# Patient Record
Sex: FEMALE | Race: WHITE | NOT HISPANIC OR LATINO
[De-identification: names, ages, dates, MRNs, and addresses within clinical notes are randomized per-mention and may not be internally consistent; named-entity substitution may affect disease eponyms.]

---

## 2017-01-31 ENCOUNTER — APPOINTMENT (OUTPATIENT)
Dept: INTERNAL MEDICINE | Facility: CLINIC | Age: 52
End: 2017-01-31

## 2017-02-17 ENCOUNTER — OUTPATIENT (OUTPATIENT)
Dept: OUTPATIENT SERVICES | Facility: HOSPITAL | Age: 52
LOS: 1 days | Discharge: HOME | End: 2017-02-17

## 2017-06-27 DIAGNOSIS — I10 ESSENTIAL (PRIMARY) HYPERTENSION: ICD-10-CM

## 2017-06-27 DIAGNOSIS — I25.10 ATHEROSCLEROTIC HEART DISEASE OF NATIVE CORONARY ARTERY WITHOUT ANGINA PECTORIS: ICD-10-CM

## 2017-06-27 DIAGNOSIS — Z01.818 ENCOUNTER FOR OTHER PREPROCEDURAL EXAMINATION: ICD-10-CM

## 2017-08-15 ENCOUNTER — APPOINTMENT (OUTPATIENT)
Dept: INTERNAL MEDICINE | Facility: CLINIC | Age: 52
End: 2017-08-15

## 2017-08-15 ENCOUNTER — OUTPATIENT (OUTPATIENT)
Dept: OUTPATIENT SERVICES | Facility: HOSPITAL | Age: 52
LOS: 1 days | Discharge: HOME | End: 2017-08-15

## 2017-08-15 VITALS
HEART RATE: 82 BPM | WEIGHT: 184 LBS | TEMPERATURE: 98.6 F | BODY MASS INDEX: 32.6 KG/M2 | HEIGHT: 63 IN | DIASTOLIC BLOOD PRESSURE: 72 MMHG | SYSTOLIC BLOOD PRESSURE: 116 MMHG

## 2017-08-15 DIAGNOSIS — Z86.73 PERSONAL HISTORY OF TRANSIENT ISCHEMIC ATTACK (TIA), AND CEREBRAL INFARCTION W/OUT RESIDUAL DEFICITS: ICD-10-CM

## 2017-08-15 DIAGNOSIS — I97.88 OTHER INTRAOPERATIVE COMPLICATIONS OF THE CIRCULATORY SYSTEM, NOT ELSEWHERE CLASSIFIED: ICD-10-CM

## 2017-08-15 DIAGNOSIS — R29.898 OTHER SYMPTOMS AND SIGNS INVOLVING THE MUSCULOSKELETAL SYSTEM: ICD-10-CM

## 2017-08-15 DIAGNOSIS — L28.2 OTHER PRURIGO: ICD-10-CM

## 2017-08-15 DIAGNOSIS — N17.9 ACUTE KIDNEY FAILURE, UNSPECIFIED: ICD-10-CM

## 2017-08-15 DIAGNOSIS — I25.10 ATHEROSCLEROTIC HEART DISEASE OF NATIVE CORONARY ARTERY WITHOUT ANGINA PECTORIS: ICD-10-CM

## 2017-08-16 DIAGNOSIS — I10 ESSENTIAL (PRIMARY) HYPERTENSION: ICD-10-CM

## 2017-08-16 DIAGNOSIS — E78.5 HYPERLIPIDEMIA, UNSPECIFIED: ICD-10-CM

## 2017-08-16 DIAGNOSIS — I50.9 HEART FAILURE, UNSPECIFIED: ICD-10-CM

## 2017-08-23 ENCOUNTER — APPOINTMENT (OUTPATIENT)
Dept: CARDIOLOGY | Facility: CLINIC | Age: 52
End: 2017-08-23

## 2017-08-23 VITALS
HEIGHT: 63 IN | HEART RATE: 67 BPM | WEIGHT: 184 LBS | BODY MASS INDEX: 32.6 KG/M2 | SYSTOLIC BLOOD PRESSURE: 130 MMHG | DIASTOLIC BLOOD PRESSURE: 84 MMHG

## 2017-09-11 ENCOUNTER — OUTPATIENT (OUTPATIENT)
Dept: OUTPATIENT SERVICES | Facility: HOSPITAL | Age: 52
LOS: 1 days | Discharge: HOME | End: 2017-09-11

## 2017-09-11 DIAGNOSIS — I97.88 OTHER INTRAOPERATIVE COMPLICATIONS OF THE CIRCULATORY SYSTEM, NOT ELSEWHERE CLASSIFIED: ICD-10-CM

## 2017-09-11 DIAGNOSIS — L28.2 OTHER PRURIGO: ICD-10-CM

## 2017-09-11 DIAGNOSIS — Z01.818 ENCOUNTER FOR OTHER PREPROCEDURAL EXAMINATION: ICD-10-CM

## 2017-09-11 DIAGNOSIS — N17.9 ACUTE KIDNEY FAILURE, UNSPECIFIED: ICD-10-CM

## 2017-09-11 DIAGNOSIS — R29.898 OTHER SYMPTOMS AND SIGNS INVOLVING THE MUSCULOSKELETAL SYSTEM: ICD-10-CM

## 2017-09-11 DIAGNOSIS — I25.10 ATHEROSCLEROTIC HEART DISEASE OF NATIVE CORONARY ARTERY WITHOUT ANGINA PECTORIS: ICD-10-CM

## 2017-09-14 ENCOUNTER — OUTPATIENT (OUTPATIENT)
Dept: OUTPATIENT SERVICES | Facility: HOSPITAL | Age: 52
LOS: 1 days | Discharge: HOME | End: 2017-09-14

## 2017-09-14 DIAGNOSIS — I34.0 NONRHEUMATIC MITRAL (VALVE) INSUFFICIENCY: ICD-10-CM

## 2017-09-14 DIAGNOSIS — I25.10 ATHEROSCLEROTIC HEART DISEASE OF NATIVE CORONARY ARTERY WITHOUT ANGINA PECTORIS: ICD-10-CM

## 2017-09-14 DIAGNOSIS — I97.88 OTHER INTRAOPERATIVE COMPLICATIONS OF THE CIRCULATORY SYSTEM, NOT ELSEWHERE CLASSIFIED: ICD-10-CM

## 2017-09-14 DIAGNOSIS — N17.9 ACUTE KIDNEY FAILURE, UNSPECIFIED: ICD-10-CM

## 2017-09-14 DIAGNOSIS — R29.898 OTHER SYMPTOMS AND SIGNS INVOLVING THE MUSCULOSKELETAL SYSTEM: ICD-10-CM

## 2017-09-14 DIAGNOSIS — L28.2 OTHER PRURIGO: ICD-10-CM

## 2017-09-15 ENCOUNTER — RX RENEWAL (OUTPATIENT)
Age: 52
End: 2017-09-15

## 2017-10-13 ENCOUNTER — OUTPATIENT (OUTPATIENT)
Dept: OUTPATIENT SERVICES | Facility: HOSPITAL | Age: 52
LOS: 1 days | Discharge: HOME | End: 2017-10-13

## 2017-10-13 DIAGNOSIS — I25.10 ATHEROSCLEROTIC HEART DISEASE OF NATIVE CORONARY ARTERY WITHOUT ANGINA PECTORIS: ICD-10-CM

## 2017-10-13 DIAGNOSIS — N17.9 ACUTE KIDNEY FAILURE, UNSPECIFIED: ICD-10-CM

## 2017-10-13 DIAGNOSIS — Z01.818 ENCOUNTER FOR OTHER PREPROCEDURAL EXAMINATION: ICD-10-CM

## 2017-10-13 DIAGNOSIS — R29.898 OTHER SYMPTOMS AND SIGNS INVOLVING THE MUSCULOSKELETAL SYSTEM: ICD-10-CM

## 2017-10-13 DIAGNOSIS — I97.88 OTHER INTRAOPERATIVE COMPLICATIONS OF THE CIRCULATORY SYSTEM, NOT ELSEWHERE CLASSIFIED: ICD-10-CM

## 2017-10-13 DIAGNOSIS — E78.00 PURE HYPERCHOLESTEROLEMIA, UNSPECIFIED: ICD-10-CM

## 2017-10-13 DIAGNOSIS — E05.00 THYROTOXICOSIS WITH DIFFUSE GOITER WITHOUT THYROTOXIC CRISIS OR STORM: ICD-10-CM

## 2017-10-13 DIAGNOSIS — L28.2 OTHER PRURIGO: ICD-10-CM

## 2017-10-19 ENCOUNTER — INPATIENT (INPATIENT)
Facility: HOSPITAL | Age: 52
LOS: 5 days | Discharge: HOME | End: 2017-10-25
Attending: INTERNAL MEDICINE

## 2017-10-19 DIAGNOSIS — R29.898 OTHER SYMPTOMS AND SIGNS INVOLVING THE MUSCULOSKELETAL SYSTEM: ICD-10-CM

## 2017-10-19 DIAGNOSIS — N17.9 ACUTE KIDNEY FAILURE, UNSPECIFIED: ICD-10-CM

## 2017-10-19 DIAGNOSIS — I97.88 OTHER INTRAOPERATIVE COMPLICATIONS OF THE CIRCULATORY SYSTEM, NOT ELSEWHERE CLASSIFIED: ICD-10-CM

## 2017-10-19 DIAGNOSIS — I25.10 ATHEROSCLEROTIC HEART DISEASE OF NATIVE CORONARY ARTERY WITHOUT ANGINA PECTORIS: ICD-10-CM

## 2017-10-19 DIAGNOSIS — L28.2 OTHER PRURIGO: ICD-10-CM

## 2017-10-19 DIAGNOSIS — N18.3 CHRONIC KIDNEY DISEASE, STAGE 3 (MODERATE): ICD-10-CM

## 2017-10-26 DIAGNOSIS — I42.9 CARDIOMYOPATHY, UNSPECIFIED: ICD-10-CM

## 2017-10-26 DIAGNOSIS — I25.10 ATHEROSCLEROTIC HEART DISEASE OF NATIVE CORONARY ARTERY WITHOUT ANGINA PECTORIS: ICD-10-CM

## 2017-10-26 DIAGNOSIS — I65.23 OCCLUSION AND STENOSIS OF BILATERAL CAROTID ARTERIES: ICD-10-CM

## 2017-10-26 DIAGNOSIS — I34.0 NONRHEUMATIC MITRAL (VALVE) INSUFFICIENCY: ICD-10-CM

## 2017-10-26 DIAGNOSIS — N18.3 CHRONIC KIDNEY DISEASE, STAGE 3 (MODERATE): ICD-10-CM

## 2017-10-26 DIAGNOSIS — Z86.73 PERSONAL HISTORY OF TRANSIENT ISCHEMIC ATTACK (TIA), AND CEREBRAL INFARCTION WITHOUT RESIDUAL DEFICITS: ICD-10-CM

## 2017-10-26 DIAGNOSIS — E78.00 PURE HYPERCHOLESTEROLEMIA, UNSPECIFIED: ICD-10-CM

## 2017-10-26 DIAGNOSIS — Z82.49 FAMILY HISTORY OF ISCHEMIC HEART DISEASE AND OTHER DISEASES OF THE CIRCULATORY SYSTEM: ICD-10-CM

## 2017-10-26 DIAGNOSIS — R73.9 HYPERGLYCEMIA, UNSPECIFIED: ICD-10-CM

## 2017-10-26 DIAGNOSIS — J44.9 CHRONIC OBSTRUCTIVE PULMONARY DISEASE, UNSPECIFIED: ICD-10-CM

## 2017-10-26 DIAGNOSIS — I12.9 HYPERTENSIVE CHRONIC KIDNEY DISEASE WITH STAGE 1 THROUGH STAGE 4 CHRONIC KIDNEY DISEASE, OR UNSPECIFIED CHRONIC KIDNEY DISEASE: ICD-10-CM

## 2017-11-06 ENCOUNTER — INPATIENT (INPATIENT)
Facility: HOSPITAL | Age: 52
LOS: 2 days | Discharge: HOME | End: 2017-11-09
Attending: SURGERY | Admitting: INTERNAL MEDICINE

## 2017-11-06 ENCOUNTER — APPOINTMENT (OUTPATIENT)
Dept: CARDIOLOGY | Facility: CLINIC | Age: 52
End: 2017-11-06

## 2017-11-06 VITALS
HEIGHT: 63 IN | WEIGHT: 176 LBS | SYSTOLIC BLOOD PRESSURE: 116 MMHG | BODY MASS INDEX: 31.18 KG/M2 | DIASTOLIC BLOOD PRESSURE: 68 MMHG | HEART RATE: 101 BPM

## 2017-11-06 DIAGNOSIS — L28.2 OTHER PRURIGO: ICD-10-CM

## 2017-11-06 DIAGNOSIS — I25.10 ATHEROSCLEROTIC HEART DISEASE OF NATIVE CORONARY ARTERY WITHOUT ANGINA PECTORIS: ICD-10-CM

## 2017-11-06 DIAGNOSIS — R29.898 OTHER SYMPTOMS AND SIGNS INVOLVING THE MUSCULOSKELETAL SYSTEM: ICD-10-CM

## 2017-11-06 DIAGNOSIS — I97.88 OTHER INTRAOPERATIVE COMPLICATIONS OF THE CIRCULATORY SYSTEM, NOT ELSEWHERE CLASSIFIED: ICD-10-CM

## 2017-11-06 DIAGNOSIS — N17.9 ACUTE KIDNEY FAILURE, UNSPECIFIED: ICD-10-CM

## 2017-11-06 RX ORDER — CLOPIDOGREL BISULFATE 75 MG/1
75 TABLET, FILM COATED ORAL DAILY
Qty: 30 | Refills: 6 | Status: DISCONTINUED | COMMUNITY
Start: 2017-09-15 | End: 2017-11-06

## 2017-11-06 RX ORDER — LISINOPRIL AND HYDROCHLOROTHIAZIDE TABLETS 20; 12.5 MG/1; MG/1
20-12.5 TABLET ORAL DAILY
Qty: 30 | Refills: 3 | Status: DISCONTINUED | COMMUNITY
Start: 2017-08-15 | End: 2017-11-06

## 2017-11-07 ENCOUNTER — APPOINTMENT (OUTPATIENT)
Dept: VASCULAR SURGERY | Facility: HOSPITAL | Age: 52
End: 2017-11-07
Payer: MEDICAID

## 2017-11-07 PROCEDURE — 35226 REPAIR BLOOD VESSEL DIR LXTR: CPT | Mod: 59,LT

## 2017-11-07 PROCEDURE — 35390 REOPERATION CAROTID ADD-ON: CPT

## 2017-11-07 PROCEDURE — 35301 RECHANNELING OF ARTERY: CPT | Mod: LT

## 2017-11-07 PROCEDURE — 35371 RECHANNELING OF ARTERY: CPT | Mod: 59,LT

## 2017-11-08 DIAGNOSIS — I70.8 ATHEROSCLEROSIS OF OTHER ARTERIES: ICD-10-CM

## 2017-11-10 ENCOUNTER — INPATIENT (INPATIENT)
Facility: HOSPITAL | Age: 52
LOS: 4 days | Discharge: HOME | End: 2017-11-15
Attending: INTERNAL MEDICINE | Admitting: INTERNAL MEDICINE

## 2017-11-10 DIAGNOSIS — N17.9 ACUTE KIDNEY FAILURE, UNSPECIFIED: ICD-10-CM

## 2017-11-10 DIAGNOSIS — L28.2 OTHER PRURIGO: ICD-10-CM
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
## 2017-11-10 DIAGNOSIS — I83.812 VARICOSE VEINS OF LEFT LOWER EXTREMITY WITH PAIN: ICD-10-CM

## 2017-11-10 DIAGNOSIS — R29.898 OTHER SYMPTOMS AND SIGNS INVOLVING THE MUSCULOSKELETAL SYSTEM: ICD-10-CM

## 2017-11-10 DIAGNOSIS — I97.88 OTHER INTRAOPERATIVE COMPLICATIONS OF THE CIRCULATORY SYSTEM, NOT ELSEWHERE CLASSIFIED: ICD-10-CM

## 2017-11-10 DIAGNOSIS — I97.89 OTHER POSTPROCEDURAL COMPLICATIONS AND DISORDERS OF THE CIRCULATORY SYSTEM, NOT ELSEWHERE CLASSIFIED: ICD-10-CM

## 2017-11-10 DIAGNOSIS — I25.10 ATHEROSCLEROTIC HEART DISEASE OF NATIVE CORONARY ARTERY WITHOUT ANGINA PECTORIS: ICD-10-CM

## 2017-11-12 DIAGNOSIS — I10 ESSENTIAL (PRIMARY) HYPERTENSION: ICD-10-CM

## 2017-11-12 DIAGNOSIS — E78.5 HYPERLIPIDEMIA, UNSPECIFIED: ICD-10-CM

## 2017-11-12 DIAGNOSIS — I95.81 POSTPROCEDURAL HYPOTENSION: ICD-10-CM

## 2017-11-12 DIAGNOSIS — I72.4 ANEURYSM OF ARTERY OF LOWER EXTREMITY: ICD-10-CM

## 2017-11-12 DIAGNOSIS — I25.10 ATHEROSCLEROTIC HEART DISEASE OF NATIVE CORONARY ARTERY WITHOUT ANGINA PECTORIS: ICD-10-CM

## 2017-11-12 DIAGNOSIS — Z95.5 PRESENCE OF CORONARY ANGIOPLASTY IMPLANT AND GRAFT: ICD-10-CM

## 2017-11-12 DIAGNOSIS — T81.718A COMPLICATION OF OTHER ARTERY FOLLOWING A PROCEDURE, NOT ELSEWHERE CLASSIFIED, INITIAL ENCOUNTER: ICD-10-CM

## 2017-11-13 ENCOUNTER — OTHER (OUTPATIENT)
Age: 52
End: 2017-11-13

## 2017-11-16 DIAGNOSIS — D62 ACUTE POSTHEMORRHAGIC ANEMIA: ICD-10-CM

## 2017-11-17 DIAGNOSIS — L03.115 CELLULITIS OF RIGHT LOWER LIMB: ICD-10-CM

## 2017-11-17 DIAGNOSIS — L03.116 CELLULITIS OF LEFT LOWER LIMB: ICD-10-CM

## 2017-11-17 DIAGNOSIS — Z86.73 PERSONAL HISTORY OF TRANSIENT ISCHEMIC ATTACK (TIA), AND CEREBRAL INFARCTION WITHOUT RESIDUAL DEFICITS: ICD-10-CM

## 2017-11-17 DIAGNOSIS — T36.1X5A ADVERSE EFFECT OF CEPHALOSPORINS AND OTHER BETA-LACTAM ANTIBIOTICS, INITIAL ENCOUNTER: ICD-10-CM

## 2017-11-17 DIAGNOSIS — L27.0 GENERALIZED SKIN ERUPTION DUE TO DRUGS AND MEDICAMENTS TAKEN INTERNALLY: ICD-10-CM

## 2017-11-17 DIAGNOSIS — Z95.5 PRESENCE OF CORONARY ANGIOPLASTY IMPLANT AND GRAFT: ICD-10-CM

## 2017-11-17 DIAGNOSIS — T81.718D COMPLICATION OF OTHER ARTERY FOLLOWING A PROCEDURE, NOT ELSEWHERE CLASSIFIED, SUBSEQUENT ENCOUNTER: ICD-10-CM

## 2017-11-17 DIAGNOSIS — R60.0 LOCALIZED EDEMA: ICD-10-CM

## 2017-11-17 DIAGNOSIS — I25.10 ATHEROSCLEROTIC HEART DISEASE OF NATIVE CORONARY ARTERY WITHOUT ANGINA PECTORIS: ICD-10-CM
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
## 2017-11-17 DIAGNOSIS — R23.3 SPONTANEOUS ECCHYMOSES: ICD-10-CM

## 2017-11-17 DIAGNOSIS — E78.5 HYPERLIPIDEMIA, UNSPECIFIED: ICD-10-CM

## 2017-11-17 DIAGNOSIS — E87.70 FLUID OVERLOAD, UNSPECIFIED: ICD-10-CM

## 2017-11-17 DIAGNOSIS — N17.9 ACUTE KIDNEY FAILURE, UNSPECIFIED: ICD-10-CM

## 2017-11-17 DIAGNOSIS — I10 ESSENTIAL (PRIMARY) HYPERTENSION: ICD-10-CM

## 2017-11-21 ENCOUNTER — OUTPATIENT (OUTPATIENT)
Dept: OUTPATIENT SERVICES | Facility: HOSPITAL | Age: 52
LOS: 1 days | Discharge: HOME | End: 2017-11-21

## 2017-11-21 ENCOUNTER — APPOINTMENT (OUTPATIENT)
Dept: INTERNAL MEDICINE | Facility: CLINIC | Age: 52
End: 2017-11-21

## 2017-11-21 VITALS
SYSTOLIC BLOOD PRESSURE: 135 MMHG | DIASTOLIC BLOOD PRESSURE: 90 MMHG | HEART RATE: 79 BPM | WEIGHT: 173 LBS | BODY MASS INDEX: 30.65 KG/M2 | HEIGHT: 63 IN

## 2017-11-21 DIAGNOSIS — J30.2 OTHER SEASONAL ALLERGIC RHINITIS: ICD-10-CM

## 2017-11-21 DIAGNOSIS — I25.10 ATHEROSCLEROTIC HEART DISEASE OF NATIVE CORONARY ARTERY WITHOUT ANGINA PECTORIS: ICD-10-CM

## 2017-11-21 DIAGNOSIS — I97.88 OTHER INTRAOPERATIVE COMPLICATIONS OF THE CIRCULATORY SYSTEM, NOT ELSEWHERE CLASSIFIED: ICD-10-CM

## 2017-11-21 DIAGNOSIS — L28.2 OTHER PRURIGO: ICD-10-CM

## 2017-11-21 DIAGNOSIS — N17.9 ACUTE KIDNEY FAILURE, UNSPECIFIED: ICD-10-CM

## 2017-11-21 DIAGNOSIS — I63.9 CEREBRAL INFARCTION, UNSPECIFIED: ICD-10-CM

## 2017-11-21 DIAGNOSIS — R29.898 OTHER SYMPTOMS AND SIGNS INVOLVING THE MUSCULOSKELETAL SYSTEM: ICD-10-CM

## 2017-11-22 DIAGNOSIS — I10 ESSENTIAL (PRIMARY) HYPERTENSION: ICD-10-CM

## 2017-11-22 DIAGNOSIS — I50.9 HEART FAILURE, UNSPECIFIED: ICD-10-CM

## 2017-11-22 DIAGNOSIS — E78.5 HYPERLIPIDEMIA, UNSPECIFIED: ICD-10-CM

## 2017-11-28 ENCOUNTER — APPOINTMENT (OUTPATIENT)
Dept: VASCULAR SURGERY | Facility: CLINIC | Age: 52
End: 2017-11-28
Payer: MEDICAID

## 2017-11-28 VITALS
HEIGHT: 63 IN | BODY MASS INDEX: 30.65 KG/M2 | SYSTOLIC BLOOD PRESSURE: 130 MMHG | DIASTOLIC BLOOD PRESSURE: 84 MMHG | WEIGHT: 173 LBS

## 2017-11-28 PROCEDURE — 99024 POSTOP FOLLOW-UP VISIT: CPT

## 2017-12-10 ENCOUNTER — INPATIENT (INPATIENT)
Facility: HOSPITAL | Age: 52
LOS: 29 days | Discharge: HOME HEALTH | End: 2018-01-09
Attending: SURGERY | Admitting: INTERNAL MEDICINE

## 2017-12-10 DIAGNOSIS — L76.32 POSTPROCEDURAL HEMATOMA OF SKIN AND SUBCUTANEOUS TISSUE FOLLOWING OTHER PROCEDURE: ICD-10-CM

## 2017-12-10 DIAGNOSIS — T81.4XXA INFECTION FOLLOWING A PROCEDURE, INITIAL ENCOUNTER: ICD-10-CM

## 2017-12-11 ENCOUNTER — OTHER (OUTPATIENT)
Age: 52
End: 2017-12-11

## 2017-12-11 ENCOUNTER — APPOINTMENT (OUTPATIENT)
Dept: VASCULAR SURGERY | Facility: HOSPITAL | Age: 52
End: 2017-12-11
Payer: MEDICAID

## 2017-12-11 PROCEDURE — 97605 NEG PRS WND THER DME<=50SQCM: CPT

## 2017-12-11 PROCEDURE — 36246 INS CATH ABD/L-EXT ART 2ND: CPT | Mod: 78

## 2017-12-11 PROCEDURE — 35142 REPAIR ARTERY RUPTURE THIGH: CPT | Mod: 78

## 2017-12-11 PROCEDURE — 75710 ARTERY X-RAYS ARM/LEG: CPT | Mod: 26

## 2017-12-11 PROCEDURE — 76937 US GUIDE VASCULAR ACCESS: CPT | Mod: 26

## 2017-12-13 ENCOUNTER — APPOINTMENT (OUTPATIENT)
Dept: VASCULAR SURGERY | Facility: CLINIC | Age: 52
End: 2017-12-13

## 2017-12-14 ENCOUNTER — APPOINTMENT (OUTPATIENT)
Dept: VASCULAR SURGERY | Facility: HOSPITAL | Age: 52
End: 2017-12-14
Payer: MEDICAID

## 2017-12-14 PROCEDURE — 97605 NEG PRS WND THER DME<=50SQCM: CPT | Mod: LT

## 2017-12-14 PROCEDURE — 11043 DBRDMT MUSC&/FSCA 1ST 20/<: CPT | Mod: 58,LT

## 2017-12-20 ENCOUNTER — APPOINTMENT (OUTPATIENT)
Dept: VASCULAR SURGERY | Facility: HOSPITAL | Age: 52
End: 2017-12-20
Payer: MEDICAID

## 2017-12-20 PROCEDURE — 11043 DBRDMT MUSC&/FSCA 1ST 20/<: CPT | Mod: 78

## 2017-12-20 PROCEDURE — 36558 INSERT TUNNELED CV CATH: CPT | Mod: 79

## 2017-12-22 ENCOUNTER — APPOINTMENT (OUTPATIENT)
Dept: VASCULAR SURGERY | Facility: HOSPITAL | Age: 52
End: 2017-12-22
Payer: MEDICAID

## 2017-12-22 PROCEDURE — 10180 I&D COMPLEX PO WOUND INFCTJ: CPT | Mod: 78,LT

## 2017-12-22 PROCEDURE — 35221 RPR BLD VSL DIR INTRA-ABDL: CPT | Mod: 78,LT

## 2017-12-22 PROCEDURE — 35226 REPAIR BLOOD VESSEL DIR LXTR: CPT | Mod: 78,59,LT

## 2017-12-22 PROCEDURE — 35665 BPG ILIOFEMORAL: CPT | Mod: 78,59,LT

## 2017-12-29 ENCOUNTER — APPOINTMENT (OUTPATIENT)
Dept: VASCULAR SURGERY | Facility: HOSPITAL | Age: 52
End: 2017-12-29
Payer: MEDICAID

## 2017-12-29 PROCEDURE — 97605 NEG PRS WND THER DME<=50SQCM: CPT | Mod: LT

## 2017-12-29 PROCEDURE — 11043 DBRDMT MUSC&/FSCA 1ST 20/<: CPT | Mod: 58,LT

## 2018-01-14 DIAGNOSIS — N39.0 URINARY TRACT INFECTION, SITE NOT SPECIFIED: ICD-10-CM

## 2018-01-14 DIAGNOSIS — N05.8 UNSPECIFIED NEPHRITIC SYNDROME WITH OTHER MORPHOLOGIC CHANGES: ICD-10-CM

## 2018-01-14 DIAGNOSIS — E87.2 ACIDOSIS: ICD-10-CM

## 2018-01-14 DIAGNOSIS — E87.1 HYPO-OSMOLALITY AND HYPONATREMIA: ICD-10-CM

## 2018-01-14 DIAGNOSIS — I70.208 UNSPECIFIED ATHEROSCLEROSIS OF NATIVE ARTERIES OF EXTREMITIES, OTHER EXTREMITY: ICD-10-CM

## 2018-01-14 DIAGNOSIS — D62 ACUTE POSTHEMORRHAGIC ANEMIA: ICD-10-CM

## 2018-01-14 DIAGNOSIS — N17.0 ACUTE KIDNEY FAILURE WITH TUBULAR NECROSIS: ICD-10-CM

## 2018-01-14 DIAGNOSIS — I12.9 HYPERTENSIVE CHRONIC KIDNEY DISEASE WITH STAGE 1 THROUGH STAGE 4 CHRONIC KIDNEY DISEASE, OR UNSPECIFIED CHRONIC KIDNEY DISEASE: ICD-10-CM

## 2018-01-14 DIAGNOSIS — N18.3 CHRONIC KIDNEY DISEASE, STAGE 3 (MODERATE): ICD-10-CM

## 2018-01-14 DIAGNOSIS — T81.718A COMPLICATION OF OTHER ARTERY FOLLOWING A PROCEDURE, NOT ELSEWHERE CLASSIFIED, INITIAL ENCOUNTER: ICD-10-CM

## 2018-01-14 DIAGNOSIS — I25.10 ATHEROSCLEROTIC HEART DISEASE OF NATIVE CORONARY ARTERY WITHOUT ANGINA PECTORIS: ICD-10-CM

## 2018-01-14 DIAGNOSIS — I72.4 ANEURYSM OF ARTERY OF LOWER EXTREMITY: ICD-10-CM

## 2018-01-14 DIAGNOSIS — A41.9 SEPSIS, UNSPECIFIED ORGANISM: ICD-10-CM

## 2018-01-14 DIAGNOSIS — F17.210 NICOTINE DEPENDENCE, CIGARETTES, UNCOMPLICATED: ICD-10-CM

## 2018-01-14 DIAGNOSIS — K21.9 GASTRO-ESOPHAGEAL REFLUX DISEASE WITHOUT ESOPHAGITIS: ICD-10-CM

## 2018-01-14 DIAGNOSIS — Z95.5 PRESENCE OF CORONARY ANGIOPLASTY IMPLANT AND GRAFT: ICD-10-CM

## 2018-01-14 DIAGNOSIS — J96.01 ACUTE RESPIRATORY FAILURE WITH HYPOXIA: ICD-10-CM

## 2018-01-14 DIAGNOSIS — E83.39 OTHER DISORDERS OF PHOSPHORUS METABOLISM: ICD-10-CM

## 2018-01-14 DIAGNOSIS — T81.19XA OTHER POSTPROCEDURAL SHOCK, INITIAL ENCOUNTER: ICD-10-CM

## 2018-01-14 DIAGNOSIS — E78.5 HYPERLIPIDEMIA, UNSPECIFIED: ICD-10-CM

## 2018-01-14 DIAGNOSIS — E87.6 HYPOKALEMIA: ICD-10-CM

## 2018-01-14 DIAGNOSIS — R65.21 SEVERE SEPSIS WITH SEPTIC SHOCK: ICD-10-CM

## 2018-01-14 DIAGNOSIS — B96.29 OTHER ESCHERICHIA COLI [E. COLI] AS THE CAUSE OF DISEASES CLASSIFIED ELSEWHERE: ICD-10-CM

## 2018-01-14 DIAGNOSIS — L27.0 GENERALIZED SKIN ERUPTION DUE TO DRUGS AND MEDICAMENTS TAKEN INTERNALLY: ICD-10-CM

## 2018-01-14 DIAGNOSIS — L76.32 POSTPROCEDURAL HEMATOMA OF SKIN AND SUBCUTANEOUS TISSUE FOLLOWING OTHER PROCEDURE: ICD-10-CM

## 2018-01-14 DIAGNOSIS — T36.8X5A ADVERSE EFFECT OF OTHER SYSTEMIC ANTIBIOTICS, INITIAL ENCOUNTER: ICD-10-CM
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
## 2018-01-15 ENCOUNTER — OTHER (OUTPATIENT)
Age: 53
End: 2018-01-15

## 2018-01-17 ENCOUNTER — OUTPATIENT (OUTPATIENT)
Dept: OUTPATIENT SERVICES | Facility: HOSPITAL | Age: 53
LOS: 1 days | Discharge: HOME | End: 2018-01-17

## 2018-01-17 DIAGNOSIS — I97.88 OTHER INTRAOPERATIVE COMPLICATIONS OF THE CIRCULATORY SYSTEM, NOT ELSEWHERE CLASSIFIED: ICD-10-CM

## 2018-01-17 DIAGNOSIS — I25.10 ATHEROSCLEROTIC HEART DISEASE OF NATIVE CORONARY ARTERY WITHOUT ANGINA PECTORIS: ICD-10-CM

## 2018-01-17 DIAGNOSIS — R29.898 OTHER SYMPTOMS AND SIGNS INVOLVING THE MUSCULOSKELETAL SYSTEM: ICD-10-CM

## 2018-01-17 DIAGNOSIS — B99.9 UNSPECIFIED INFECTIOUS DISEASE: ICD-10-CM

## 2018-01-17 DIAGNOSIS — L28.2 OTHER PRURIGO: ICD-10-CM

## 2018-01-17 DIAGNOSIS — N17.9 ACUTE KIDNEY FAILURE, UNSPECIFIED: ICD-10-CM

## 2018-01-18 ENCOUNTER — INPATIENT (INPATIENT)
Facility: HOSPITAL | Age: 53
LOS: 32 days | Discharge: NURSING, REHAB OR LONG-TERM CARE | End: 2018-02-20
Attending: SURGERY | Admitting: INTERNAL MEDICINE

## 2018-01-18 DIAGNOSIS — T82.838A HEMORRHAGE DUE TO VASCULAR PROSTHETIC DEVICES, IMPLANTS AND GRAFTS, INITIAL ENCOUNTER: ICD-10-CM

## 2018-01-18 DIAGNOSIS — T81.32XA DISRUPTION OF INTERNAL OPERATION (SURGICAL) WOUND, NOT ELSEWHERE CLASSIFIED, INITIAL ENCOUNTER: ICD-10-CM
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
## 2018-01-22 ENCOUNTER — OTHER (OUTPATIENT)
Age: 53
End: 2018-01-22

## 2018-01-23 ENCOUNTER — APPOINTMENT (OUTPATIENT)
Dept: VASCULAR SURGERY | Facility: HOSPITAL | Age: 53
End: 2018-01-23
Payer: MEDICAID

## 2018-01-23 ENCOUNTER — APPOINTMENT (OUTPATIENT)
Dept: VASCULAR SURGERY | Facility: CLINIC | Age: 53
End: 2018-01-23

## 2018-01-23 PROCEDURE — 36589 REMOVAL TUNNELED CV CATH: CPT | Mod: 58,RT

## 2018-01-24 DIAGNOSIS — T81.4XXA INFECTION FOLLOWING A PROCEDURE, INITIAL ENCOUNTER: ICD-10-CM

## 2018-01-24 DIAGNOSIS — Z79.01 LONG TERM (CURRENT) USE OF ANTICOAGULANTS: ICD-10-CM

## 2018-01-24 DIAGNOSIS — R78.81 BACTEREMIA: ICD-10-CM

## 2018-01-24 DIAGNOSIS — L03.314 CELLULITIS OF GROIN: ICD-10-CM

## 2018-01-24 DIAGNOSIS — I97.610 POSTPROCEDURAL HEMORRHAGE OF A CIRCULATORY SYSTEM ORGAN OR STRUCTURE FOLLOWING A CARDIAC CATHETERIZATION: ICD-10-CM

## 2018-01-24 DIAGNOSIS — N17.9 ACUTE KIDNEY FAILURE, UNSPECIFIED: ICD-10-CM

## 2018-01-24 DIAGNOSIS — B96.20 UNSPECIFIED ESCHERICHIA COLI [E. COLI] AS THE CAUSE OF DISEASES CLASSIFIED ELSEWHERE: ICD-10-CM

## 2018-01-26 ENCOUNTER — APPOINTMENT (OUTPATIENT)
Dept: VASCULAR SURGERY | Facility: HOSPITAL | Age: 53
End: 2018-01-26

## 2018-01-31 ENCOUNTER — APPOINTMENT (OUTPATIENT)
Dept: VASCULAR SURGERY | Facility: HOSPITAL | Age: 53
End: 2018-01-31
Payer: MEDICAID

## 2018-01-31 PROCEDURE — 35556 ART BYP GRFT FEM-POPLITEAL: CPT | Mod: 58,59

## 2018-01-31 PROCEDURE — 35840 EXPLORE ABDOMINAL VESSELS: CPT | Mod: 58

## 2018-01-31 PROCEDURE — 35881 REVISE GRAFT W/VEIN: CPT | Mod: 58

## 2018-01-31 PROCEDURE — 35860 EXPLORE LIMB VESSELS: CPT | Mod: 58,59

## 2018-01-31 PROCEDURE — 35565 ART BYP GRFT ILIOFEMORAL: CPT | Mod: 58

## 2018-01-31 PROCEDURE — 35903 EXCISION GRAFT EXTREMITY: CPT | Mod: 58

## 2018-02-03 VITALS — WEIGHT: 178.57 LBS | HEIGHT: 63 IN

## 2018-02-03 RX ORDER — DOCUSATE SODIUM 100 MG
100 CAPSULE ORAL THREE TIMES A DAY
Qty: 0 | Refills: 0 | Status: DISCONTINUED | OUTPATIENT
Start: 2018-02-03 | End: 2018-02-03

## 2018-02-03 RX ORDER — NAFCILLIN 10 G/100ML
2 INJECTION, POWDER, FOR SOLUTION INTRAVENOUS EVERY 4 HOURS
Qty: 0 | Refills: 0 | Status: DISCONTINUED | OUTPATIENT
Start: 2018-02-03 | End: 2018-02-07

## 2018-02-03 RX ORDER — ATORVASTATIN CALCIUM 80 MG/1
80 TABLET, FILM COATED ORAL AT BEDTIME
Qty: 0 | Refills: 0 | Status: DISCONTINUED | OUTPATIENT
Start: 2018-02-03 | End: 2018-02-20

## 2018-02-03 RX ORDER — NAFCILLIN 10 G/100ML
2 INJECTION, POWDER, FOR SOLUTION INTRAVENOUS EVERY 4 HOURS
Qty: 0 | Refills: 0 | Status: DISCONTINUED | OUTPATIENT
Start: 2018-02-03 | End: 2018-02-03

## 2018-02-03 RX ORDER — ACETAMINOPHEN 500 MG
650 TABLET ORAL EVERY 6 HOURS
Qty: 0 | Refills: 0 | Status: DISCONTINUED | OUTPATIENT
Start: 2018-02-03 | End: 2018-02-03

## 2018-02-03 RX ORDER — METOPROLOL TARTRATE 50 MG
25 TABLET ORAL
Qty: 0 | Refills: 0 | Status: DISCONTINUED | OUTPATIENT
Start: 2018-02-03 | End: 2018-02-09

## 2018-02-03 RX ORDER — NAFCILLIN 10 G/100ML
2 INJECTION, POWDER, FOR SOLUTION INTRAVENOUS ONCE
Qty: 0 | Refills: 0 | Status: DISCONTINUED | OUTPATIENT
Start: 2018-02-03 | End: 2018-02-03

## 2018-02-03 RX ORDER — NAFCILLIN 10 G/100ML
INJECTION, POWDER, FOR SOLUTION INTRAVENOUS
Qty: 0 | Refills: 0 | Status: DISCONTINUED | OUTPATIENT
Start: 2018-02-03 | End: 2018-02-03

## 2018-02-03 RX ORDER — ACETAMINOPHEN 500 MG
650 TABLET ORAL EVERY 4 HOURS
Qty: 0 | Refills: 0 | Status: DISCONTINUED | OUTPATIENT
Start: 2018-02-03 | End: 2018-02-20

## 2018-02-03 RX ORDER — ONDANSETRON 8 MG/1
4 TABLET, FILM COATED ORAL EVERY 8 HOURS
Qty: 0 | Refills: 0 | Status: DISCONTINUED | OUTPATIENT
Start: 2018-02-03 | End: 2018-02-20

## 2018-02-03 RX ORDER — CALCIUM ACETATE 667 MG
667 TABLET ORAL
Qty: 0 | Refills: 0 | Status: DISCONTINUED | OUTPATIENT
Start: 2018-02-03 | End: 2018-02-03

## 2018-02-03 RX ORDER — ONDANSETRON 8 MG/1
4 TABLET, FILM COATED ORAL THREE TIMES A DAY
Qty: 0 | Refills: 0 | Status: DISCONTINUED | OUTPATIENT
Start: 2018-02-03 | End: 2018-02-03

## 2018-02-03 RX ORDER — DOCUSATE SODIUM 100 MG
100 CAPSULE ORAL THREE TIMES A DAY
Qty: 0 | Refills: 0 | Status: DISCONTINUED | OUTPATIENT
Start: 2018-02-03 | End: 2018-02-20

## 2018-02-03 RX ORDER — ATORVASTATIN CALCIUM 80 MG/1
80 TABLET, FILM COATED ORAL AT BEDTIME
Qty: 0 | Refills: 0 | Status: DISCONTINUED | OUTPATIENT
Start: 2018-02-03 | End: 2018-02-03

## 2018-02-03 RX ORDER — METOPROLOL TARTRATE 50 MG
25 TABLET ORAL
Qty: 0 | Refills: 0 | Status: DISCONTINUED | OUTPATIENT
Start: 2018-02-03 | End: 2018-02-03

## 2018-02-03 RX ORDER — CALCIUM ACETATE 667 MG
667 TABLET ORAL
Qty: 0 | Refills: 0 | Status: DISCONTINUED | OUTPATIENT
Start: 2018-02-03 | End: 2018-02-16

## 2018-02-03 RX ORDER — ONDANSETRON 8 MG/1
4 TABLET, FILM COATED ORAL EVERY 8 HOURS
Qty: 0 | Refills: 0 | Status: DISCONTINUED | OUTPATIENT
Start: 2018-02-03 | End: 2018-02-03

## 2018-02-03 RX ORDER — OXYCODONE HYDROCHLORIDE 5 MG/1
5 TABLET ORAL EVERY 4 HOURS
Qty: 0 | Refills: 0 | Status: DISCONTINUED | OUTPATIENT
Start: 2018-02-03 | End: 2018-02-07

## 2018-02-03 RX ORDER — OXYCODONE AND ACETAMINOPHEN 5; 325 MG/1; MG/1
1 TABLET ORAL EVERY 4 HOURS
Qty: 0 | Refills: 0 | Status: DISCONTINUED | OUTPATIENT
Start: 2018-02-03 | End: 2018-02-03

## 2018-02-03 RX ADMIN — Medication 100 MILLIGRAM(S): at 21:59

## 2018-02-03 RX ADMIN — OXYCODONE HYDROCHLORIDE 5 MILLIGRAM(S): 5 TABLET ORAL at 23:52

## 2018-02-03 RX ADMIN — NAFCILLIN 200 GRAM(S): 10 INJECTION, POWDER, FOR SOLUTION INTRAVENOUS at 22:02

## 2018-02-03 RX ADMIN — Medication 667 MILLIGRAM(S): at 20:10

## 2018-02-03 RX ADMIN — ATORVASTATIN CALCIUM 80 MILLIGRAM(S): 80 TABLET, FILM COATED ORAL at 21:59

## 2018-02-03 RX ADMIN — Medication 25 MILLIGRAM(S): at 20:24

## 2018-02-04 LAB
ANION GAP SERPL CALC-SCNC: 10 MMOL/L — SIGNIFICANT CHANGE UP (ref 7–14)
BUN SERPL-MCNC: 36 MG/DL — HIGH (ref 10–20)
CALCIUM SERPL-MCNC: 7.7 MG/DL — LOW (ref 8.5–10.1)
CHLORIDE SERPL-SCNC: 111 MMOL/L — HIGH (ref 98–110)
CO2 SERPL-SCNC: 20 MMOL/L — SIGNIFICANT CHANGE UP (ref 17–32)
CREAT SERPL-MCNC: 4.6 MG/DL — CRITICAL HIGH (ref 0.7–1.5)
GLUCOSE SERPL-MCNC: 74 MG/DL — SIGNIFICANT CHANGE UP (ref 70–110)
HCT VFR BLD CALC: 22.6 % — LOW (ref 37–47)
HCT VFR BLD CALC: 24.5 % — LOW (ref 37–47)
HGB BLD-MCNC: 7.6 G/DL — LOW (ref 14–18)
HGB BLD-MCNC: 8.4 G/DL — LOW (ref 14–18)
MAGNESIUM SERPL-MCNC: 2 MG/DL — SIGNIFICANT CHANGE UP (ref 1.8–2.4)
MCHC RBC-ENTMCNC: 28.8 PG — SIGNIFICANT CHANGE UP (ref 27–31)
MCHC RBC-ENTMCNC: 29.2 PG — SIGNIFICANT CHANGE UP (ref 27–31)
MCHC RBC-ENTMCNC: 33.6 G/DL — SIGNIFICANT CHANGE UP (ref 32–37)
MCHC RBC-ENTMCNC: 34.3 G/DL — SIGNIFICANT CHANGE UP (ref 32–37)
MCV RBC AUTO: 85.1 FL — SIGNIFICANT CHANGE UP (ref 81–91)
MCV RBC AUTO: 85.6 FL — SIGNIFICANT CHANGE UP (ref 81–91)
NRBC # BLD: 0 /100 WBCS — SIGNIFICANT CHANGE UP (ref 0–0)
NRBC # BLD: 0 /100 WBCS — SIGNIFICANT CHANGE UP (ref 0–0)
PHOSPHATE SERPL-MCNC: 6.6 MG/DL — HIGH (ref 2.1–4.9)
PLATELET # BLD AUTO: 408 K/UL — HIGH (ref 130–400)
PLATELET # BLD AUTO: 409 K/UL — HIGH (ref 130–400)
POTASSIUM SERPL-MCNC: 3.5 MMOL/L — SIGNIFICANT CHANGE UP (ref 3.5–5)
POTASSIUM SERPL-SCNC: 3.5 MMOL/L — SIGNIFICANT CHANGE UP (ref 3.5–5)
RBC # BLD: 2.64 M/UL — LOW (ref 4.2–5.4)
RBC # BLD: 2.88 M/UL — LOW (ref 4.2–5.4)
RBC # FLD: 15.6 % — HIGH (ref 11.5–14.5)
RBC # FLD: 15.6 % — HIGH (ref 11.5–14.5)
SODIUM SERPL-SCNC: 141 MMOL/L — SIGNIFICANT CHANGE UP (ref 135–146)
WBC # BLD: 7.4 K/UL — SIGNIFICANT CHANGE UP (ref 4.8–10.8)
WBC # BLD: 9.33 K/UL — SIGNIFICANT CHANGE UP (ref 4.8–10.8)
WBC # FLD AUTO: 7.4 K/UL — SIGNIFICANT CHANGE UP (ref 4.8–10.8)
WBC # FLD AUTO: 9.33 K/UL — SIGNIFICANT CHANGE UP (ref 4.8–10.8)

## 2018-02-04 RX ORDER — INFLUENZA VIRUS VACCINE 15; 15; 15; 15 UG/.5ML; UG/.5ML; UG/.5ML; UG/.5ML
0.5 SUSPENSION INTRAMUSCULAR ONCE
Qty: 0 | Refills: 0 | Status: DISCONTINUED | OUTPATIENT
Start: 2018-02-04 | End: 2018-02-20

## 2018-02-04 RX ADMIN — OXYCODONE HYDROCHLORIDE 5 MILLIGRAM(S): 5 TABLET ORAL at 11:49

## 2018-02-04 RX ADMIN — NAFCILLIN 200 GRAM(S): 10 INJECTION, POWDER, FOR SOLUTION INTRAVENOUS at 11:13

## 2018-02-04 RX ADMIN — OXYCODONE HYDROCHLORIDE 5 MILLIGRAM(S): 5 TABLET ORAL at 00:30

## 2018-02-04 RX ADMIN — ATORVASTATIN CALCIUM 80 MILLIGRAM(S): 80 TABLET, FILM COATED ORAL at 22:07

## 2018-02-04 RX ADMIN — NAFCILLIN 200 GRAM(S): 10 INJECTION, POWDER, FOR SOLUTION INTRAVENOUS at 06:05

## 2018-02-04 RX ADMIN — NAFCILLIN 200 GRAM(S): 10 INJECTION, POWDER, FOR SOLUTION INTRAVENOUS at 22:07

## 2018-02-04 RX ADMIN — Medication 667 MILLIGRAM(S): at 17:25

## 2018-02-04 RX ADMIN — Medication 100 MILLIGRAM(S): at 22:08

## 2018-02-04 RX ADMIN — Medication 667 MILLIGRAM(S): at 10:31

## 2018-02-04 RX ADMIN — Medication 100 MILLIGRAM(S): at 06:05

## 2018-02-04 RX ADMIN — NAFCILLIN 200 GRAM(S): 10 INJECTION, POWDER, FOR SOLUTION INTRAVENOUS at 03:23

## 2018-02-04 RX ADMIN — NAFCILLIN 200 GRAM(S): 10 INJECTION, POWDER, FOR SOLUTION INTRAVENOUS at 14:58

## 2018-02-04 RX ADMIN — Medication 667 MILLIGRAM(S): at 13:01

## 2018-02-04 RX ADMIN — Medication 25 MILLIGRAM(S): at 06:04

## 2018-02-04 RX ADMIN — OXYCODONE HYDROCHLORIDE 5 MILLIGRAM(S): 5 TABLET ORAL at 11:04

## 2018-02-04 RX ADMIN — NAFCILLIN 200 GRAM(S): 10 INJECTION, POWDER, FOR SOLUTION INTRAVENOUS at 17:25

## 2018-02-04 RX ADMIN — Medication 25 MILLIGRAM(S): at 17:26

## 2018-02-04 NOTE — PROGRESS NOTE ADULT - SUBJECTIVE AND OBJECTIVE BOX
52y Female PAST MEDICAL & SURGICAL HISTORY: S/P LT ILIOFEM BYPASS W PTFE, & LIGATION OF LT CFA 12/2017, S/P LEFT CFA PSA REPAIR WITH PATCH 11/2017, LEFT FEM PSA W/ MULTIPLE DEBRIDEMENS, HTN. ESRD ON HD, CAD      Hospital Day: 17  Post Operative Day: 4    Procedure: S/P EXCISION OF INFECTED PTFE OBTURATOR GRAFT, AND REDO BYPASS WITH REVERSE SAPH VEIN 1/31, S/P TDC REMOVAL 1/23, NEW  FOCAL DISSECTION OF MID RT EXT ILIAC ART    Events of the Last 24h: No acute events, patient lost IV access and dose of antibiotics were delayed. dressing was changed. rehab consult was placed    Vital Signs Last 24 Hrs  T(C): 36.5 (04 Feb 2018 00:11), Max: 36.6 (03 Feb 2018 15:50)  T(F): 97.7 (04 Feb 2018 00:11), Max: 97.8 (03 Feb 2018 15:50)  HR: 73 (04 Feb 2018 00:11) (73 - 94)  BP: 122/69 (04 Feb 2018 00:11) (122/69 - 163/98)  BP(mean): --  RR: 18 (04 Feb 2018 00:11) (18 - 20)  SpO2: --      I&O's Summary   I&O's Detail      PHYSICAL EXAM:    GENERAL: NAD    HEENT: NCAT    CHEST/LUNGS: CTAB    HEART: RRR,  No murmurs, rubs, or gallops    ABDOMEN: SNTND, abdominal wound is tender around incision site    EXTREMITIES:  groin dressing was changed, wound is CDI, medial left thigh dressing is saturated, remainder of dressings are CDI    NEURO: No focal neurological deficits    SKIN: No rashes or lesions    INCISION/WOUNDS: wounds all appear clean, balyne-incisional tenderness       MEDICATIONS  (STANDING):  atorvastatin 80 milliGRAM(s) Oral at bedtime  calcium acetate 667 milliGRAM(s) Oral three times a day with meals  docusate sodium 100 milliGRAM(s) Oral three times a day  metoprolol     tartrate 25 milliGRAM(s) Oral two times a day  nafcillin  IVPB 2 Gram(s) IV Intermittent every 4 hours    MEDICATIONS  (PRN):  acetaminophen   Tablet. 650 milliGRAM(s) Oral every 4 hours PRN Mild Pain (1 - 3)  ondansetron Injectable 4 milliGRAM(s) IV Push every 8 hours PRN Nausea and/or Vomiting  oxyCODONE    IR 5 milliGRAM(s) Oral every 4 hours PRN Moderate Pain (4 - 6)                              8.4    9.33  )-----------( 408      ( 03 Feb 2018 23:21 )             24.5        CBC Full  -  ( 03 Feb 2018 23:21 )  WBC Count : 9.33 K/uL  Hemoglobin : 8.4 g/dL  Hematocrit : 24.5 %  Platelet Count - Automated : 408 K/uL  Mean Cell Volume : 85.1 fL  Mean Cell Hemoglobin : 29.2 pg  Mean Cell Hemoglobin Concentration : 34.3 g/dL  Auto Neutrophil # : x  Auto Lymphocyte # : x  Auto Monocyte # : x  Auto Eosinophil # : x  Auto Basophil # : x  Auto Neutrophil % : x  Auto Lymphocyte % : x  Auto Monocyte % : x  Auto Eosinophil % : x  Auto Basophil % : x      SPECTRA: [id number]

## 2018-02-04 NOTE — PROGRESS NOTE ADULT - ASSESSMENT
Patient stable post operatively. Must continue nafcillin q4h, control pain as needed. daily WTD dressing changes in groin. Trend H&H, f/u with PT/Rehab and encourage ambulation

## 2018-02-05 DIAGNOSIS — L02.612 CUTANEOUS ABSCESS OF LEFT FOOT: ICD-10-CM

## 2018-02-05 DIAGNOSIS — L03.116 CELLULITIS OF LEFT LOWER LIMB: ICD-10-CM

## 2018-02-05 RX ORDER — ASPIRIN/CALCIUM CARB/MAGNESIUM 324 MG
81 TABLET ORAL DAILY
Qty: 0 | Refills: 0 | Status: DISCONTINUED | OUTPATIENT
Start: 2018-02-05 | End: 2018-02-20

## 2018-02-05 RX ADMIN — Medication 81 MILLIGRAM(S): at 13:31

## 2018-02-05 RX ADMIN — OXYCODONE HYDROCHLORIDE 5 MILLIGRAM(S): 5 TABLET ORAL at 00:05

## 2018-02-05 RX ADMIN — OXYCODONE HYDROCHLORIDE 5 MILLIGRAM(S): 5 TABLET ORAL at 15:42

## 2018-02-05 RX ADMIN — Medication 25 MILLIGRAM(S): at 17:36

## 2018-02-05 RX ADMIN — OXYCODONE HYDROCHLORIDE 5 MILLIGRAM(S): 5 TABLET ORAL at 07:39

## 2018-02-05 RX ADMIN — OXYCODONE HYDROCHLORIDE 5 MILLIGRAM(S): 5 TABLET ORAL at 16:30

## 2018-02-05 RX ADMIN — Medication 667 MILLIGRAM(S): at 09:44

## 2018-02-05 RX ADMIN — NAFCILLIN 200 GRAM(S): 10 INJECTION, POWDER, FOR SOLUTION INTRAVENOUS at 01:48

## 2018-02-05 RX ADMIN — NAFCILLIN 200 GRAM(S): 10 INJECTION, POWDER, FOR SOLUTION INTRAVENOUS at 06:00

## 2018-02-05 RX ADMIN — OXYCODONE HYDROCHLORIDE 5 MILLIGRAM(S): 5 TABLET ORAL at 10:38

## 2018-02-05 RX ADMIN — Medication 100 MILLIGRAM(S): at 06:00

## 2018-02-05 RX ADMIN — ATORVASTATIN CALCIUM 80 MILLIGRAM(S): 80 TABLET, FILM COATED ORAL at 21:47

## 2018-02-05 RX ADMIN — Medication 667 MILLIGRAM(S): at 13:31

## 2018-02-05 RX ADMIN — Medication 650 MILLIGRAM(S): at 19:43

## 2018-02-05 RX ADMIN — Medication 100 MILLIGRAM(S): at 13:31

## 2018-02-05 RX ADMIN — Medication 25 MILLIGRAM(S): at 06:00

## 2018-02-05 RX ADMIN — OXYCODONE HYDROCHLORIDE 5 MILLIGRAM(S): 5 TABLET ORAL at 10:37

## 2018-02-05 RX ADMIN — Medication 100 MILLIGRAM(S): at 21:47

## 2018-02-05 RX ADMIN — Medication 667 MILLIGRAM(S): at 17:35

## 2018-02-05 NOTE — CONSULT NOTE ADULT - ASSESSMENT
IMPRESSION: Rehab of gait abnormality     PRECAUTIONS: [ x ] Cardiac  [ x ] Respiratory  [  ] Seizures [  ] Contact Isolation  [  ] Droplet Isolation  [  ] Other    Weight Bearing Status: WBAT    RECOMMENDATION:    Out of Bed to Chair     DVT/Decubiti Prophylaxis    REHAB PLAN:     [ x  ] Bedside P/T 3-5 times a week   [   ]   Bedside O/T  2-3 times a week             [   ] No Rehab Therapy Indicated                   [   ]  Speech Therapy   Conditioning/ROM                                    ADL  Bed Mobility                                               Conditioning/ROM  Transfers                                                     Bed Mobility  Sitting /Standing Balance                         Transfers                                        Gait Training                                               Sitting/Standing Balance  Stair Training [   ]Applicable                    Home equipment Eval                                                                        Splinting  [   ] Only      GOALS:   ADL   [ xx  ]   Independent                    Transfers  [  x ] Independent                          Ambulation  [ x  ] Independent     [  x  ] With device                            [   ]  CG                                                         [   ]  CG                                                                  [   ] CG                            [    ] Min A                                                   [   ] Min A                                                              [   ] Min  A          DISCHARGE PLAN:   [   ]  Good candidate for Intensive Rehabilitation/Hospital based-4A [facility]                                             Will tolerate 3hrs Intensive Rehab Daily                                       [ x   ]  Short Term Rehab in Skilled Nursing Facility                         vs                                     [  x  ]  Home with Outpatient or VN services                                         [    ]  Possible Candidate for Intensive Hospital based Rehab

## 2018-02-05 NOTE — PROGRESS NOTE ADULT - SUBJECTIVE AND OBJECTIVE BOX
VASCULAR SURGERY PROGRESS NOTE    Hospital Day #  Post-Op Day #    Procedure: S/P EXCISION OF INFECTED PTFE OBTURATOR GRAFT, AND REDO BYPASS WITH REVERSE SAPH VEIN 1/31, S/P TDC REMOVAL 1/23, NEW  FOCAL DISSECTION OF MID RT EXT ILIAC ART    Events of past 24 hours:  no events overnight    PAST MEDICAL & SURGICAL HISTORY:  S/P LT ILIOFEM BYPASS W PTFE, & LIGATION OF LT CFA 12/2017, S/P LEFT CFA PSA REPAIR WITH PATCH 11/2017, LEFT FEM PSA W/ MULTIPLE DEBRIDEMENS, HTN. ESRD ON HD, CAD    Vital Signs Last 24 Hrs  T(C): 36.9 (05 Feb 2018 07:47), Max: 37.1 (04 Feb 2018 15:44)  T(F): 98.5 (05 Feb 2018 07:47), Max: 98.7 (04 Feb 2018 15:44)  HR: 77 (05 Feb 2018 07:47) (73 - 80)  BP: 159/83 (05 Feb 2018 07:47) (136/67 - 182/84)  BP(mean): --  RR: 18 (05 Feb 2018 07:47) (18 - 19)  SpO2: --    Pain (0-10):            Pain Control Adequate: [X] YES [] N    Diet: Renal    I&O's Detail    04 Feb 2018 07:01  -  05 Feb 2018 07:00  --------------------------------------------------------  IN:    Oral Fluid: 1010 mL  Total IN: 1010 mL    OUT:    Stool: 1 mL    Voided: 750 mL  Total OUT: 751 mL    Total NET: 259 mL      Bowel Movement: [x] YES [] NO  Flatus: [x] YES [] NO    PHYSICAL EXAM    General: NAD  Abdomen: RLQ ABD INCISION W/ STAPLES, C/D/I OPEN TO AIR.  Extremities: LLE WITH BYPASS INCISION OPEN TO AIR, STAPLES IN PLACE, NO ERYTHEMA, NO DISCHARGE, B/L LE WARM, W/ DOP SIG.  Incision: C/D/I    MEDICATIONS  (STANDING):  atorvastatin 80 milliGRAM(s) Oral at bedtime  calcium acetate 667 milliGRAM(s) Oral three times a day with meals  docusate sodium 100 milliGRAM(s) Oral three times a day  influenza   Vaccine 0.5 milliLiter(s) IntraMuscular once  metoprolol     tartrate 25 milliGRAM(s) Oral two times a day  nafcillin  IVPB 2 Gram(s) IV Intermittent every 4 hours    MEDICATIONS  (PRN):  acetaminophen   Tablet. 650 milliGRAM(s) Oral every 4 hours PRN Mild Pain (1 - 3)  ondansetron Injectable 4 milliGRAM(s) IV Push every 8 hours PRN Nausea and/or Vomiting  oxyCODONE    IR 5 milliGRAM(s) Oral every 4 hours PRN Moderate Pain (4 - 6)      DVT PROPHYLAXIS: [] YES [X] NO  GI PROPHYLAXIS: [] YES [X] NO  ANTIPLATELETS: [] YES [X] NO  ANTICOAGULATION: [] YES [X] NO  ANTIBIOTICS: [X] YES [] NO - nafcillin    LAB/STUDIES:                        7.6    7.40  )-----------( 409      ( 04 Feb 2018 08:15 )             22.6     02-04    141  |  111<H>  |  36<H>  ----------------------------<  74  3.5   |  20  |  4.6<HH>    Ca    7.7<L>      04 Feb 2018 08:15  Phos  6.6     02-04  Mg     2.0     02-04      ASSESSMENT/PLAN:    WILL SCHEDULE FOR PICC LINE TODAY FOR LONG TERM IV ABX, F/U WITH RENAL IF PATIENT WILL REQUIRE HD. BYPASS WOUNDS C/D/I. ON DISCHARGE START ON BRILINTA & ASA.        SPECTRA: [id number] VASCULAR SURGERY PROGRESS NOTE    Procedure: S/P EXCISION OF INFECTED PTFE OBTURATOR GRAFT, AND REDO BYPASS WITH REVERSE SAPH VEIN 1/31, S/P TDC REMOVAL 1/23, NEW  FOCAL DISSECTION OF MID RT EXT ILIAC ART    Events of past 24 hours:  no events overnight    PAST MEDICAL & SURGICAL HISTORY:  S/P LT ILIOFEM BYPASS W PTFE, & LIGATION OF LT CFA 12/2017, S/P LEFT CFA PSA REPAIR WITH PATCH 11/2017, LEFT FEM PSA W/ MULTIPLE DEBRIDEMENS, HTN. ESRD ON HD, CAD    Vital Signs Last 24 Hrs  T(C): 36.9 (05 Feb 2018 07:47), Max: 37.1 (04 Feb 2018 15:44)  T(F): 98.5 (05 Feb 2018 07:47), Max: 98.7 (04 Feb 2018 15:44)  HR: 77 (05 Feb 2018 07:47) (73 - 80)  BP: 159/83 (05 Feb 2018 07:47) (136/67 - 182/84)  BP(mean): --  RR: 18 (05 Feb 2018 07:47) (18 - 19)  SpO2: --    Pain (0-10):            Pain Control Adequate: [X] YES [] N    Diet: Renal    I&O's Detail    04 Feb 2018 07:01  -  05 Feb 2018 07:00  --------------------------------------------------------  IN:    Oral Fluid: 1010 mL  Total IN: 1010 mL    OUT:    Stool: 1 mL    Voided: 750 mL  Total OUT: 751 mL    Total NET: 259 mL      Bowel Movement: [x] YES [] NO  Flatus: [x] YES [] NO    PHYSICAL EXAM    General: NAD  Abdomen: RLQ ABD INCISION W/ STAPLES, C/D/I OPEN TO AIR.  Extremities: LLE WITH BYPASS INCISION OPEN TO AIR, STAPLES IN PLACE, NO ERYTHEMA, NO DISCHARGE, B/L LE WARM, W/ DOP SIG.  Incision: C/D/I, NO DISCHARGE.     MEDICATIONS  (STANDING):  atorvastatin 80 milliGRAM(s) Oral at bedtime  calcium acetate 667 milliGRAM(s) Oral three times a day with meals  docusate sodium 100 milliGRAM(s) Oral three times a day  influenza   Vaccine 0.5 milliLiter(s) IntraMuscular once  metoprolol     tartrate 25 milliGRAM(s) Oral two times a day  nafcillin  IVPB 2 Gram(s) IV Intermittent every 4 hours    MEDICATIONS  (PRN):  acetaminophen   Tablet. 650 milliGRAM(s) Oral every 4 hours PRN Mild Pain (1 - 3)  ondansetron Injectable 4 milliGRAM(s) IV Push every 8 hours PRN Nausea and/or Vomiting  oxyCODONE    IR 5 milliGRAM(s) Oral every 4 hours PRN Moderate Pain (4 - 6)      DVT PROPHYLAXIS: [] YES [X] NO  GI PROPHYLAXIS: [] YES [X] NO  ANTIPLATELETS: [] YES [X] NO  ANTICOAGULATION: [] YES [X] NO  ANTIBIOTICS: [X] YES [] NO - nafcillin    LAB/STUDIES:                        7.6    7.40  )-----------( 409      ( 04 Feb 2018 08:15 )             22.6     02-04    141  |  111<H>  |  36<H>  ----------------------------<  74  3.5   |  20  |  4.6<HH>    Ca    7.7<L>      04 Feb 2018 08:15  Phos  6.6     02-04  Mg     2.0     02-04      ASSESSMENT/PLAN:    - PICC LINE TODAY  - ID: ANCEF X 6 WEEKS.  - RENAL: no need for RRT  - PT/REHAB: SNF vs. HOME PT  - PODIATRY FOLLOWING.   - ON DISCHARGE WILL START ON BRIL[place]TA.    SPECTRA: [id number]

## 2018-02-05 NOTE — PROGRESS NOTE ADULT - SUBJECTIVE AND OBJECTIVE BOX
PROGRESS NOTE   Patient is a 52y old  Female who presents with a chief complaint of abscess left foot    HPI:      Vital Signs Last 24 Hrs  T(C): 36.6 (05 Feb 2018 15:29), Max: 36.9 (05 Feb 2018 07:47)  T(F): 97.9 (05 Feb 2018 15:29), Max: 98.5 (05 Feb 2018 07:47)  HR: 74 (05 Feb 2018 15:29) (74 - 77)  BP: 174/95 (05 Feb 2018 15:29) (155/87 - 174/95)  BP(mean): --  RR: 20 (05 Feb 2018 15:29) (18 - 20)  SpO2: --                          7.6    7.40  )-----------( 409      ( 04 Feb 2018 08:15 )             22.6               02-04    141  |  111<H>  |  36<H>  ----------------------------<  74  3.5   |  20  |  4.6<HH>    Ca    7.7<L>      04 Feb 2018 08:15  Phos  6.6     02-04  Mg     2.0     02-04        PHYSICAL EXAM  GEN: WILLI BRAGG is a pleasant well-nourished, well developed 52y Female in no acute distress, alert awake, and oriented to person, place and time.  Left foot diffused edema  No calor  No drainage  Mild erythema proximal to first interspace dorsal

## 2018-02-05 NOTE — PROGRESS NOTE ADULT - SUBJECTIVE AND OBJECTIVE BOX
[facility] FOLLOW UP NOTE  --------------------------------------------------------------------------------  Chief Complaint: DIONICIO farrah PIGN   24 hour events/subjective:        PAST HISTORY  --------------------------------------------------------------------------------  No significant changes to PMH, PSH, FHx, SHx, unless otherwise noted    ALLERGIES & MEDICATIONS  --------------------------------------------------------------------------------  Allergies    ciprofloxacin (Rash)  Keflex (Unknown)     Inpatient Medications  acetaminophen   Tablet. 650 milliGRAM(s) Oral every 4 hours PRN  ondansetron Injectable 4 milliGRAM(s) IV Push every 8 hours PRN  oxyCODONE    IR 5 milliGRAM(s) Oral every 4 hours PRN        VITALS/PHYSICAL EXAM  --------------------------------------------------------------------------------  T(C): 36.9 (02-05-18 @ 07:47), Max: 37.1 (02-04-18 @ 15:44)  HR: 77 (02-05-18 @ 07:47) (76 - 80)  BP: 159/83 (02-05-18 @ 07:47) (136/67 - 159/83)  RR: 18 (02-05-18 @ 07:47) (18 - 18)  SpO2: --  Wt(kg): --        02-04-18 @ 07:01  -  02-05-18 @ 07:00  --------------------------------------------------------  IN: 1010 mL / OUT: 751 mL / NET: 259 mL    02-05-18 @ 07:01  -  02-05-18 @ 13:57  --------------------------------------------------------  IN: 350 mL / OUT: 500 mL / NET: -150 mL      Physical Exam:  	Gen: NAD, well-appearing  	HEENT: PERRL, supple neck, clear oropharynx  	Pulm: CTA B/L  	CV: RRR, S1S2; no rub  	Back: No spinal or CVA tenderness; no sacral edema  	Abd: +BS, soft, nontender/nondistended  	: No suprapubic tenderness  	UE: Warm, FROM, no clubbing, intact strength; no edema; no asterixis  	LE: Warm, FROM, no clubbing, intact strength; no edema  	Neuro: No focal deficits, intact gait  	Psych: Normal affect and mood  	Skin: Warm, without rashes  	Vascular access:    LABS/STUDIES  --------------------------------------------------------------------------------              7.6    7.40  >-----------<  409      [02-04-18 @ 08:15]              22.6     141  |  111  |  36  ----------------------------<  74      [02-04-18 @ 08:15]  3.5   |  20  |  4.6        Ca     7.7     [02-04-18 @ 08:15]      Mg     2.0     [02-04-18 @ 08:15]      Phos  6.6     [02-04-18 @ 08:15]            Creatinine Trend:  SCr 4.6 [02-04 @ 08:15] [facility] FOLLOW UP NOTE  --------------------------------------------------------------------------------  Chief Complaint: DIONICIO sp PIGN   24 hour events/subjective:        PAST HISTORY  --------------------------------------------------------------------------------  No significant changes to PMH, PSH, FHx, SHx, unless otherwise noted    ALLERGIES & MEDICATIONS  --------------------------------------------------------------------------------  Allergies    ciprofloxacin (Rash)  Keflex (Unknown)     Inpatient Medications  Metoprolol  Nafcillin  Phoslo  ASA  lipitor        VITALS/PHYSICAL EXAM  --------------------------------------------------------------------------------  T(C): 36.9 (02-05-18 @ 07:47), Max: 37.1 (02-04-18 @ 15:44)  HR: 77 (02-05-18 @ 07:47) (76 - 80)  BP: 159/83 (02-05-18 @ 07:47) (136/67 - 159/83)  RR: 18 (02-05-18 @ 07:47) (18 - 18)  SpO2: --  Wt(kg): --        02-04-18 @ 07:01  -  02-05-18 @ 07:00  --------------------------------------------------------  IN: 1010 mL / OUT: 751 mL / NET: 259 mL    02-05-18 @ 07:01  -  02-05-18 @ 13:57  --------------------------------------------------------  IN: 350 mL / OUT: 500 mL / NET: -150 mL      Physical Exam:  	Gen: NAD, well-appearing  	HEENT: PERRL, supple neck, clear oropharynx  	Pulm: CTA B/L  	CV: RRR, S1S2; no rub  	Back: No spinal or CVA tenderness; no sacral edema  	Abd: +BS, soft, nontender/nondistended  	: No suprapubic tenderness  	UE: Warm, FROM, no clubbing, intact strength; no edema; no asterixis  	LE: Warm, FROM, no clubbing, intact strength; no edema  	Neuro: No focal deficits, intact gait  	Psych: Normal affect and mood  	Skin: Warm, without rashes  	Vascular access:    LABS/STUDIES  --------------------------------------------------------------------------------              7.6    7.40  >-----------<  409      [02-04-18 @ 08:15]              22.6     141  |  111  |  36  ----------------------------<  74      [02-04-18 @ 08:15]  3.5   |  20  |  4.6        Ca     7.7     [02-04-18 @ 08:15]      Mg     2.0     [02-04-18 @ 08:15]      Phos  6.6     [02-04-18 @ 08:15]            Creatinine Trend:  SCr 4.6 [02-04 @ 08:15] [facility] FOLLOW UP NOTE  --------------------------------------------------------------------------------  Chief Complaint: DIONICIO THOMSON   24 hour events/subjective:  Patient seen and examined  Events over the last 24 h noted  Denied any complaints no chest pain no sob no other complaints         PAST HISTORY  --------------------------------------------------------------------------------  No significant changes to PMH, PSH, FHx, SHx, unless otherwise noted    ALLERGIES & MEDICATIONS  --------------------------------------------------------------------------------  Allergies    ciprofloxacin (Rash)  Keflex (Unknown)     Inpatient Medications  Metoprolol  Nafcillin  Phoslo  ASA  lipitor        VITALS/PHYSICAL EXAM  --------------------------------------------------------------------------------  T(C): 36.9 (02-05-18 @ 07:47), Max: 37.1 (02-04-18 @ 15:44)  HR: 77 (02-05-18 @ 07:47) (76 - 80)  BP: 159/83 (02-05-18 @ 07:47) (136/67 - 159/83)  RR: 18 (02-05-18 @ 07:47) (18 - 18)  SpO2: --  Wt(kg): --        02-04-18 @ 07:01  -  02-05-18 @ 07:00  --------------------------------------------------------  IN: 1010 mL / OUT: 751 mL / NET: 259 mL    02-05-18 @ 07:01  -  02-05-18 @ 13:57  --------------------------------------------------------  IN: 350 mL / OUT: 500 mL / NET: -150 mL      Physical Exam:  	Gen: NAD, well-appearing  	HEENT: PERRL, supple neck, clear oropharynx  	Pulm: CTA B/L  	CV: RRR, S1S2; no rub  	Back: No spinal or CVA tenderness; no sacral edema  	Abd: +BS, soft, nontender/nondistended  	: No suprapubic tenderness  	UE: Warm, FROM, no clubbing, intact strength; no edema; no asterixis  	LE: Warm, FROM, no clubbing, intact strength; no edema  	Skin: Warm, without rashes      LABS/STUDIES  --------------------------------------------------------------------------------              7.6    7.40  >-----------<  409      [02-04-18 @ 08:15]              22.6     141  |  111  |  36  ----------------------------<  74      [02-04-18 @ 08:15]  3.5   |  20  |  4.6        Ca     7.7     [02-04-18 @ 08:15]      Mg     2.0     [02-04-18 @ 08:15]      Phos  6.6     [02-04-18 @ 08:15]            Creatinine Trend:  SCr 4.6 [02-04 @ 08:15]

## 2018-02-05 NOTE — PROGRESS NOTE ADULT - ASSESSMENT
1- DIONICIO /ATN/PIGN on kidney biopsy Creatinine noted 4.6   no need for RRT   repeat BMP, Ip and strict I and O  2- left pseudoaneurysm sp repair and bacteremia on nafcillin for PICC line  3- Hyperphosphatemia on Phoslo continue

## 2018-02-05 NOTE — CONSULT NOTE ADULT - SUBJECTIVE AND OBJECTIVE BOX
HPI:  S/P EXCISION OF INFECTED PTFE OBTURATOR GRAFT, AND REDO BYPASS WITH REVERSE SAPH VEIN 1/31, S/P TDC REMOVAL 1/23, NEW  FOCAL DISSECTION OF MID RT EXT ILIAC ART      PAST MEDICAL & SURGICAL HISTORY:  see hpi      Hospital Course: see   patients  written  chart    TODAY'S SUBJECTIVE & REVIEW OF SYMPTOMS:     Constitutional WNL   Cardio WNL   Resp WNL   GI WNL  Heme WNL  Endo WNL  Skin WNL  MSK WNL  Neuro WNL  Cognitive WNL  Psych WNL      MEDICATIONS  (STANDING):  aspirin  chewable 81 milliGRAM(s) Oral daily  atorvastatin 80 milliGRAM(s) Oral at bedtime  calcium acetate 667 milliGRAM(s) Oral three times a day with meals  docusate sodium 100 milliGRAM(s) Oral three times a day  influenza   Vaccine 0.5 milliLiter(s) IntraMuscular once  metoprolol     tartrate 25 milliGRAM(s) Oral two times a day  nafcillin  IVPB 2 Gram(s) IV Intermittent every 4 hours    MEDICATIONS  (PRN):  acetaminophen   Tablet. 650 milliGRAM(s) Oral every 4 hours PRN Mild Pain (1 - 3)  ondansetron Injectable 4 milliGRAM(s) IV Push every 8 hours PRN Nausea and/or Vomiting  oxyCODONE    IR 5 milliGRAM(s) Oral every 4 hours PRN Moderate Pain (4 - 6)      FAMILY HISTORY:      Allergies    ciprofloxacin (Rash)  Keflex (Unknown)    Intolerances        SOCIAL HISTORY:    [  ] Etoh  [  ] Smoking  [  ] Substance abuse     Home Environment:  [  ] Home Alone  [ x ] Lives with Family  [  ] Home Health Aid    Dwelling:  [  ] Apartment  [x  ] Private House  [  ] Adult Home  [  ] Skilled Nursing Facility      [  ] Short Term  [  ] Long Term  [ x ] Stairs       Elevator [  ]    FUNCTIONAL STATUS PTA: (Check all that apply)  Ambulation: [ x  ]Independent    [  ] Dependent     [  ] Non-Ambulatory  Assistive Device: [  ] SA Cane  [  ]  Q Cane  [  ] Walker  [  ]  Wheelchair  ADL[x  ] Independent  [  ]  Dependent       Vital Signs Last 24 Hrs  T(C): 36.9 (05 Feb 2018 07:47), Max: 37.1 (04 Feb 2018 15:44)  T(F): 98.5 (05 Feb 2018 07:47), Max: 98.7 (04 Feb 2018 15:44)  HR: 77 (05 Feb 2018 07:47) (76 - 80)  BP: 159/83 (05 Feb 2018 07:47) (136/67 - 159/83)  BP(mean): --  RR: 18 (05 Feb 2018 07:47) (18 - 18)  SpO2: --      PHYSICAL EXAM: Alert & Oriented X3  GENERAL: NAD, well-groomed, well-developed  HEAD:  Atraumatic, Normocephalic  EYES: EOMI, PERRLA, conjunctiva and sclera clear  NECK: Supple, No JVD, Normal thyroid  CHEST/LUNG: Clear to percussion bilaterally; No rales, rhonchi, wheezing, or rubs  HEART: Regular rate and rhythm; No murmurs, rubs, or gallops  ABDOMEN: Soft, Nontender, Nondistended; Bowel sounds present  EXTREMITIES:  2+ edema  incision intact    NERVOUS SYSTEM:  Cranial Nerves 2-12 intact [ x ] Abnormal  [  ]  ROM: WFL all extremities [xx  ]  Abnormal [  ]  Motor Strength: WFL all extremities  [  ]  Abnormal [ x ] 4/5 except lle 3-/5 grossly  Sensation: intact to light touch [x  ] Abnormal [  ]  Reflexes: Symmetric [x  ]  Abnormal [  ]    FUNCTIONAL STATUS:  Bed Mobility: Independent [  ]  Supervision [x  ]  Needs Assistance [  ]  N/A [  ]  Transfers: Independent [  ]  Supervision [ x ]  Needs Assistance [  ]  N/A [  ]   Ambulation: Independent [  ]  Supervision [  ]  Needs Assistance [  ]  N/A [x  ]  ADL: Independent [  ] Requires Assistance [ x ] N/A [  ]      LABS:                        7.6    7.40  )-----------( 409      ( 04 Feb 2018 08:15 )             22.6     02-04    141  |  111<H>  |  36<H>  ----------------------------<  74  3.5   |  20  |  4.6<HH>    Ca    7.7<L>      04 Feb 2018 08:15  Phos  6.6     02-04  Mg     2.0     02-04            RADIOLOGY & ADDITIONAL STUDI

## 2018-02-06 LAB
ANION GAP SERPL CALC-SCNC: 7 MMOL/L — SIGNIFICANT CHANGE UP (ref 7–14)
BASOPHILS # BLD AUTO: 0.06 K/UL — SIGNIFICANT CHANGE UP (ref 0–0.2)
BASOPHILS NFR BLD AUTO: 0.9 % — SIGNIFICANT CHANGE UP (ref 0–1)
BUN SERPL-MCNC: 31 MG/DL — HIGH (ref 10–20)
CALCIUM SERPL-MCNC: 7.9 MG/DL — LOW (ref 8.5–10.1)
CHLORIDE SERPL-SCNC: 110 MMOL/L — SIGNIFICANT CHANGE UP (ref 98–110)
CO2 SERPL-SCNC: 23 MMOL/L — SIGNIFICANT CHANGE UP (ref 17–32)
CREAT SERPL-MCNC: 4.4 MG/DL — CRITICAL HIGH (ref 0.7–1.5)
EOSINOPHIL # BLD AUTO: 0.16 K/UL — SIGNIFICANT CHANGE UP (ref 0–0.7)
EOSINOPHIL NFR BLD AUTO: 2.4 % — SIGNIFICANT CHANGE UP (ref 0–8)
GLUCOSE SERPL-MCNC: 96 MG/DL — SIGNIFICANT CHANGE UP (ref 70–110)
HCT VFR BLD CALC: 20.7 % — LOW (ref 37–47)
HGB BLD-MCNC: 7 G/DL — CRITICAL LOW (ref 14–18)
IMM GRANULOCYTES NFR BLD AUTO: 0.3 % — SIGNIFICANT CHANGE UP (ref 0.1–0.3)
LYMPHOCYTES # BLD AUTO: 1.02 K/UL — LOW (ref 1.2–3.4)
LYMPHOCYTES # BLD AUTO: 15.4 % — LOW (ref 20.5–51.1)
MAGNESIUM SERPL-MCNC: 1.9 MG/DL — SIGNIFICANT CHANGE UP (ref 1.8–2.4)
MCHC RBC-ENTMCNC: 29.2 PG — SIGNIFICANT CHANGE UP (ref 27–31)
MCHC RBC-ENTMCNC: 33.8 G/DL — SIGNIFICANT CHANGE UP (ref 32–37)
MCV RBC AUTO: 86.3 FL — SIGNIFICANT CHANGE UP (ref 81–91)
MONOCYTES # BLD AUTO: 0.47 K/UL — SIGNIFICANT CHANGE UP (ref 0.1–0.6)
MONOCYTES NFR BLD AUTO: 7.1 % — SIGNIFICANT CHANGE UP (ref 1.7–9.3)
NEUTROPHILS # BLD AUTO: 4.88 K/UL — SIGNIFICANT CHANGE UP (ref 1.4–6.5)
NEUTROPHILS NFR BLD AUTO: 73.9 % — SIGNIFICANT CHANGE UP (ref 42.2–75.2)
PHOSPHATE SERPL-MCNC: 5.8 MG/DL — HIGH (ref 2.1–4.9)
PLATELET # BLD AUTO: 385 K/UL — SIGNIFICANT CHANGE UP (ref 130–400)
POTASSIUM SERPL-MCNC: 2.8 MMOL/L — LOW (ref 3.5–5)
POTASSIUM SERPL-SCNC: 2.8 MMOL/L — LOW (ref 3.5–5)
RBC # BLD: 2.4 M/UL — LOW (ref 4.2–5.4)
RBC # FLD: 15 % — HIGH (ref 11.5–14.5)
SODIUM SERPL-SCNC: 140 MMOL/L — SIGNIFICANT CHANGE UP (ref 135–146)
TYPE + AB SCN PNL BLD: SIGNIFICANT CHANGE UP
WBC # BLD: 6.61 K/UL — SIGNIFICANT CHANGE UP (ref 4.8–10.8)
WBC # FLD AUTO: 6.61 K/UL — SIGNIFICANT CHANGE UP (ref 4.8–10.8)

## 2018-02-06 RX ORDER — HYDRALAZINE HCL 50 MG
25 TABLET ORAL ONCE
Qty: 0 | Refills: 0 | Status: COMPLETED | OUTPATIENT
Start: 2018-02-06 | End: 2018-02-06

## 2018-02-06 RX ORDER — METOPROLOL TARTRATE 50 MG
25 TABLET ORAL ONCE
Qty: 0 | Refills: 0 | Status: COMPLETED | OUTPATIENT
Start: 2018-02-06 | End: 2018-02-06

## 2018-02-06 RX ADMIN — Medication 25 MILLIGRAM(S): at 05:56

## 2018-02-06 RX ADMIN — Medication 100 MILLIGRAM(S): at 14:35

## 2018-02-06 RX ADMIN — Medication 25 MILLIGRAM(S): at 17:17

## 2018-02-06 RX ADMIN — Medication 25 MILLIGRAM(S): at 02:33

## 2018-02-06 RX ADMIN — NAFCILLIN 200 GRAM(S): 10 INJECTION, POWDER, FOR SOLUTION INTRAVENOUS at 14:32

## 2018-02-06 RX ADMIN — NAFCILLIN 200 GRAM(S): 10 INJECTION, POWDER, FOR SOLUTION INTRAVENOUS at 21:41

## 2018-02-06 RX ADMIN — OXYCODONE HYDROCHLORIDE 5 MILLIGRAM(S): 5 TABLET ORAL at 20:11

## 2018-02-06 RX ADMIN — NAFCILLIN 200 GRAM(S): 10 INJECTION, POWDER, FOR SOLUTION INTRAVENOUS at 11:01

## 2018-02-06 RX ADMIN — Medication 650 MILLIGRAM(S): at 15:01

## 2018-02-06 RX ADMIN — Medication 667 MILLIGRAM(S): at 17:15

## 2018-02-06 RX ADMIN — Medication 81 MILLIGRAM(S): at 11:01

## 2018-02-06 RX ADMIN — Medication 667 MILLIGRAM(S): at 11:32

## 2018-02-06 RX ADMIN — Medication 650 MILLIGRAM(S): at 03:01

## 2018-02-06 RX ADMIN — ATORVASTATIN CALCIUM 80 MILLIGRAM(S): 80 TABLET, FILM COATED ORAL at 21:41

## 2018-02-06 RX ADMIN — Medication 100 MILLIGRAM(S): at 05:52

## 2018-02-06 RX ADMIN — NAFCILLIN 200 GRAM(S): 10 INJECTION, POWDER, FOR SOLUTION INTRAVENOUS at 17:15

## 2018-02-06 RX ADMIN — Medication 650 MILLIGRAM(S): at 14:31

## 2018-02-06 RX ADMIN — Medication 100 MILLIGRAM(S): at 21:40

## 2018-02-06 RX ADMIN — OXYCODONE HYDROCHLORIDE 5 MILLIGRAM(S): 5 TABLET ORAL at 20:41

## 2018-02-06 RX ADMIN — Medication 25 MILLIGRAM(S): at 21:41

## 2018-02-06 NOTE — PROGRESS NOTE ADULT - ASSESSMENT
DIONICIO /PIGN/ATN on biopsy/ bacteremia/ pseudoaneurysm  # off HD  # creatinine stable  # check repeat today  # remains on nafcillin  # on phoslo, check ph/pth  # will follow

## 2018-02-06 NOTE — PROGRESS NOTE ADULT - SUBJECTIVE AND OBJECTIVE BOX
VASCULAR SURGERY PROGRESS NOTE    Procedure: S/P EXCISION OF INFECTED PTFE OBTURATOR GRAFT, AND REDO BYPASS WITH REVERSE SAPH VEIN 1/31, S/P TDC REMOVAL 1/23, NEW  FOCAL DISSECTION OF MID RT EXT ILIAC ART    Events of past 24 hours:  no events overnight    PAST MEDICAL & SURGICAL HISTORY:  S/P LT ILIOFEM BYPASS W PTFE, & LIGATION OF LT CFA 12/2017, S/P LEFT CFA PSA REPAIR WITH PATCH 11/2017, LEFT FEM PSA W/ MULTIPLE DEBRIDEMENS, HTN. ESRD ON HD, CAD    Vital Signs Last 24 Hrs  T(C): 36.9 (05 Feb 2018 07:47), Max: 37.1 (04 Feb 2018 15:44)  T(F): 98.5 (05 Feb 2018 07:47), Max: 98.7 (04 Feb 2018 15:44)  HR: 77 (05 Feb 2018 07:47) (73 - 80)  BP: 159/83 (05 Feb 2018 07:47) (136/67 - 182/84)  BP(mean): --  RR: 18 (05 Feb 2018 07:47) (18 - 19)  SpO2: --    Pain (0-10):            Pain Control Adequate: [X] YES [] N    Diet: Renal    I&O's Detail    04 Feb 2018 07:01  -  05 Feb 2018 07:00  --------------------------------------------------------  IN:    Oral Fluid: 1010 mL  Total IN: 1010 mL    OUT:    Stool: 1 mL    Voided: 750 mL  Total OUT: 751 mL    Total NET: 259 mL      Bowel Movement: [x] YES [] NO  Flatus: [x] YES [] NO    PHYSICAL EXAM    General: NAD  Abdomen: RLQ ABD INCISION W/ STAPLES, C/D/I OPEN TO AIR.  Extremities: LLE WITH BYPASS INCISION OPEN TO AIR, STAPLES IN PLACE, NO ERYTHEMA, NO DISCHARGE, B/L LE WARM, W/ DOP SIG.  Incision: C/D/I, NO DISCHARGE.     MEDICATIONS  (STANDING):  atorvastatin 80 milliGRAM(s) Oral at bedtime  calcium acetate 667 milliGRAM(s) Oral three times a day with meals  docusate sodium 100 milliGRAM(s) Oral three times a day  influenza   Vaccine 0.5 milliLiter(s) IntraMuscular once  metoprolol     tartrate 25 milliGRAM(s) Oral two times a day  nafcillin  IVPB 2 Gram(s) IV Intermittent every 4 hours    MEDICATIONS  (PRN):  acetaminophen   Tablet. 650 milliGRAM(s) Oral every 4 hours PRN Mild Pain (1 - 3)  ondansetron Injectable 4 milliGRAM(s) IV Push every 8 hours PRN Nausea and/or Vomiting  oxyCODONE    IR 5 milliGRAM(s) Oral every 4 hours PRN Moderate Pain (4 - 6)      DVT PROPHYLAXIS: [] YES [X] NO  GI PROPHYLAXIS: [] YES [X] NO  ANTIPLATELETS: [] YES [X] NO  ANTICOAGULATION: [] YES [X] NO  ANTIBIOTICS: [X] YES [] NO - nafcillin    LAB/STUDIES:                        7.6    7.40  )-----------( 409      ( 04 Feb 2018 08:15 )             22.6     02-04    141  |  111<H>  |  36<H>  ----------------------------<  74  3.5   |  20  |  4.6<HH>    Ca    7.7<L>      04 Feb 2018 08:15  Phos  6.6     02-04  Mg     2.0     02-04      ASSESSMENT/PLAN:    - PICC LINE TODAY  - ID: ANCEF X 6 WEEKS.  - RENAL: no need for RRT  - PT/REHAB: SNF vs. HOME PT  - PODIATRY FOLLOWING.   - ON DISCHARGE WILL START ON [place].    SPECTRA: [id number]

## 2018-02-06 NOTE — CHART NOTE - NSCHARTNOTEFT_GEN_A_CORE
Registered Dietitian Follow-Up     Patient Profile Reviewed                           Yes [x]   No []     Nutrition History Previously Obtained        Yes [x]  No []             Pertinent Subjective Information: per chart and progress note information, pt was admitted with sepsis and c/o cellulitis of the foot. Pt was having issues with AVF earlier during this admission and was on HD but pt has been off HD. Now, off HD, stable Cr, nephro is following, pt c/w abx. Podiatry following for cellulitis?     Pertinent Medical Interventions:  PMHX taken from chart today - CKD3 vs ESRD on HD MWF, Hx of ischemic stroke, CAD s/p PCI.     Diet order: Renal dialysis      Anthropometrics:  - Ht.   64 inches  - Wt.   178# CBW. vs last week it was measured 178.6#. Weight fluctuation likely from previous HD. No edema noted.  - %wt change  - BMI    31.6  - IBW     Pertinent Lab Data:   2/4: RBC 2.64, H/H 7.6/22.6, BUN 36, GFR 10, Cr 4.6, Ca 7.7, Phos 6.6 (high)     Pertinent Meds: acetaminophen, atorvastatin, macey acetate, docusate, metoprolol, ondansetron, oxy     Physical Findings:  - Appearance: Obese, alert and oriented, appears lethargic   - GI function: Pt reported diarrhea daily. LBM 2/6. Suspecting from Abx IV  - Tubes:   - Oral/Mouth cavity:  denies per pt  - Skin:  cellulitis of L foot and abscess of R foot     Nutrition Requirements  Weight Used: CBW     Estimated Energy Needs    Continue []  Adjust [x]     5914-8228 kcal/day (MSJ x 1.1-1.2) for pt current off HD at this time and BMI at 30.       Estimated Protein Needs    Continue []  Adjust [x]    55-66 g/day (1.0-1.2 g/kg of IBW)        Estimated Fluid Needs        Continue []  Adjust [x]  Adjusted Fluid Recommendations:  1ml/kcal (1ml/kcal)       Nutrient Intake: Pt reported consuming about 75% of most meals vs RN reporting pt consuming a usual of 50-75%. Per pt, appetite is good, however, not able to eat that much during meals        [] Previous Nutrition Diagnosis:            [x] Ongoing          [] Resolved    [] No active nutrition diagnosis identified at this time     Nutrition Diagnostic #1   Problem:  Inadequate energy-protein intake  Etiology: probable acuity of illness   Statement: consuming less than 75% of meals reported by staff and pt     Nutrition Diagnostic #2  Problem:  Etiology:  Statement:     Nutrition Intervention: pt to consume and tolerate >75% of all meals and snacks and recommended supplements upon f/u due 2/9.      Goal/Expected Outcome:  Supplements will be given.     Indicator/Monitoring: RD to monitor diet order, energy intake, body composition, Renal profile, lytes, nutrition focused physical findings (skin)

## 2018-02-06 NOTE — PROGRESS NOTE ADULT - SUBJECTIVE AND OBJECTIVE BOX
[facility] FOLLOW UP NOTE  --------------------------------------------------------------------------------  Chief Complaint:    no new complaints         Standing Inpatient Medications  aspirin  chewable 81 milliGRAM(s) Oral daily  atorvastatin 80 milliGRAM(s) Oral at bedtime  calcium acetate 667 milliGRAM(s) Oral three times a day with meals  docusate sodium 100 milliGRAM(s) Oral three times a day  influenza   Vaccine 0.5 milliLiter(s) IntraMuscular once  metoprolol     tartrate 25 milliGRAM(s) Oral two times a day  nafcillin  IVPB 2 Gram(s) IV Intermittent every 4 hours    PRN Inpatient Medications  acetaminophen   Tablet. 650 milliGRAM(s) Oral every 4 hours PRN  ondansetron Injectable 4 milliGRAM(s) IV Push every 8 hours PRN  oxyCODONE    IR 5 milliGRAM(s) Oral every 4 hours PRN      VITALS/PHYSICAL EXAM  --------------------------------------------------------------------------------  T(C): 36.7 (02-06-18 @ 08:02), Max: 36.7 (02-06-18 @ 00:06)  HR: 75 (02-06-18 @ 08:02) (74 - 84)  BP: 144/90 (02-06-18 @ 08:02) (144/90 - 183/86)  RR: 18 (02-06-18 @ 08:02) (18 - 20)        02-05-18 @ 07:01  -  02-06-18 @ 07:00  --------------------------------------------------------  IN: 650 mL / OUT: 1200 mL / NET: -550 mL      Physical Exam:  	Gen: NAD, well-appearing  	Pulm: CTA B/L  	CV: RRR, S1S2; no rub  	Abd: +BS, soft, nontender/nondistended               LE :no edema  	    LABS/STUDIES  --------------------------------------------------------------------------------                Creatinine Trend:  SCr 4.6 [02-04 @ 08:15]

## 2018-02-07 LAB
ANION GAP SERPL CALC-SCNC: 11 MMOL/L — SIGNIFICANT CHANGE UP (ref 7–14)
BASOPHILS # BLD AUTO: 0.11 K/UL — SIGNIFICANT CHANGE UP (ref 0–0.2)
BASOPHILS NFR BLD AUTO: 1.5 % — HIGH (ref 0–1)
BUN SERPL-MCNC: 29 MG/DL — HIGH (ref 10–20)
CALCIUM SERPL-MCNC: 7.7 MG/DL — LOW (ref 8.5–10.1)
CHLORIDE SERPL-SCNC: 104 MMOL/L — SIGNIFICANT CHANGE UP (ref 98–110)
CO2 SERPL-SCNC: 23 MMOL/L — SIGNIFICANT CHANGE UP (ref 17–32)
CREAT SERPL-MCNC: 3.9 MG/DL — HIGH (ref 0.7–1.5)
EOSINOPHIL # BLD AUTO: 0.21 K/UL — SIGNIFICANT CHANGE UP (ref 0–0.7)
EOSINOPHIL NFR BLD AUTO: 2.9 % — SIGNIFICANT CHANGE UP (ref 0–8)
GLUCOSE SERPL-MCNC: 89 MG/DL — SIGNIFICANT CHANGE UP (ref 70–110)
HCT VFR BLD CALC: 28.9 % — LOW (ref 37–47)
HCT VFR BLD CALC: 30.2 % — LOW (ref 37–47)
HGB BLD-MCNC: 10.4 G/DL — LOW (ref 14–18)
HGB BLD-MCNC: 9.8 G/DL — LOW (ref 14–18)
IMM GRANULOCYTES NFR BLD AUTO: 0.4 % — HIGH (ref 0.1–0.3)
LYMPHOCYTES # BLD AUTO: 0.93 K/UL — LOW (ref 1.2–3.4)
LYMPHOCYTES # BLD AUTO: 12.8 % — LOW (ref 20.5–51.1)
MAGNESIUM SERPL-MCNC: 1.8 MG/DL — SIGNIFICANT CHANGE UP (ref 1.8–2.4)
MAGNESIUM SERPL-MCNC: 1.9 MG/DL — SIGNIFICANT CHANGE UP (ref 1.8–2.4)
MCHC RBC-ENTMCNC: 28.4 PG — SIGNIFICANT CHANGE UP (ref 27–31)
MCHC RBC-ENTMCNC: 28.7 PG — SIGNIFICANT CHANGE UP (ref 27–31)
MCHC RBC-ENTMCNC: 33.9 G/DL — SIGNIFICANT CHANGE UP (ref 32–37)
MCHC RBC-ENTMCNC: 34.4 G/DL — SIGNIFICANT CHANGE UP (ref 32–37)
MCV RBC AUTO: 83.4 FL — SIGNIFICANT CHANGE UP (ref 81–91)
MCV RBC AUTO: 83.8 FL — SIGNIFICANT CHANGE UP (ref 81–91)
MONOCYTES # BLD AUTO: 0.54 K/UL — SIGNIFICANT CHANGE UP (ref 0.1–0.6)
MONOCYTES NFR BLD AUTO: 7.4 % — SIGNIFICANT CHANGE UP (ref 1.7–9.3)
NEUTROPHILS # BLD AUTO: 5.44 K/UL — SIGNIFICANT CHANGE UP (ref 1.4–6.5)
NEUTROPHILS NFR BLD AUTO: 75 % — SIGNIFICANT CHANGE UP (ref 42.2–75.2)
NRBC # BLD: 0 /100 WBCS — SIGNIFICANT CHANGE UP (ref 0–0)
PHOSPHATE SERPL-MCNC: 6.2 MG/DL — HIGH (ref 2.1–4.9)
PHOSPHATE SERPL-MCNC: 6.4 MG/DL — HIGH (ref 2.1–4.9)
PLATELET # BLD AUTO: 381 K/UL — SIGNIFICANT CHANGE UP (ref 130–400)
PLATELET # BLD AUTO: 429 K/UL — HIGH (ref 130–400)
POTASSIUM SERPL-MCNC: 2.9 MMOL/L — LOW (ref 3.5–5)
POTASSIUM SERPL-SCNC: 2.9 MMOL/L — LOW (ref 3.5–5)
RBC # BLD: 3.45 M/UL — LOW (ref 4.2–5.4)
RBC # BLD: 3.62 M/UL — LOW (ref 4.2–5.4)
RBC # FLD: 15.6 % — HIGH (ref 11.5–14.5)
RBC # FLD: 15.9 % — HIGH (ref 11.5–14.5)
SODIUM SERPL-SCNC: 138 MMOL/L — SIGNIFICANT CHANGE UP (ref 135–146)
WBC # BLD: 7.26 K/UL — SIGNIFICANT CHANGE UP (ref 4.8–10.8)
WBC # BLD: 7.34 K/UL — SIGNIFICANT CHANGE UP (ref 4.8–10.8)
WBC # FLD AUTO: 7.26 K/UL — SIGNIFICANT CHANGE UP (ref 4.8–10.8)
WBC # FLD AUTO: 7.34 K/UL — SIGNIFICANT CHANGE UP (ref 4.8–10.8)

## 2018-02-07 RX ORDER — CEFAZOLIN SODIUM 1 G
VIAL (EA) INJECTION
Qty: 0 | Refills: 0 | Status: DISCONTINUED | OUTPATIENT
Start: 2018-02-07 | End: 2018-02-20

## 2018-02-07 RX ORDER — CEFAZOLIN SODIUM 1 G
2000 VIAL (EA) INJECTION EVERY 8 HOURS
Qty: 0 | Refills: 0 | Status: DISCONTINUED | OUTPATIENT
Start: 2018-02-07 | End: 2018-02-20

## 2018-02-07 RX ORDER — LISINOPRIL 2.5 MG/1
5 TABLET ORAL DAILY
Qty: 0 | Refills: 0 | Status: DISCONTINUED | OUTPATIENT
Start: 2018-02-07 | End: 2018-02-08

## 2018-02-07 RX ORDER — FUROSEMIDE 40 MG
10 TABLET ORAL ONCE
Qty: 0 | Refills: 0 | Status: COMPLETED | OUTPATIENT
Start: 2018-02-07 | End: 2018-02-07

## 2018-02-07 RX ORDER — HYDRALAZINE HCL 50 MG
25 TABLET ORAL ONCE
Qty: 0 | Refills: 0 | Status: COMPLETED | OUTPATIENT
Start: 2018-02-07 | End: 2018-02-07

## 2018-02-07 RX ORDER — CEFAZOLIN SODIUM 1 G
2000 VIAL (EA) INJECTION ONCE
Qty: 0 | Refills: 0 | Status: COMPLETED | OUTPATIENT
Start: 2018-02-07 | End: 2018-02-07

## 2018-02-07 RX ADMIN — Medication 10 MILLIGRAM(S): at 15:12

## 2018-02-07 RX ADMIN — Medication 650 MILLIGRAM(S): at 20:02

## 2018-02-07 RX ADMIN — LISINOPRIL 5 MILLIGRAM(S): 2.5 TABLET ORAL at 12:34

## 2018-02-07 RX ADMIN — Medication 650 MILLIGRAM(S): at 02:00

## 2018-02-07 RX ADMIN — Medication 100 MILLIGRAM(S): at 14:23

## 2018-02-07 RX ADMIN — Medication 100 MILLIGRAM(S): at 19:01

## 2018-02-07 RX ADMIN — Medication 667 MILLIGRAM(S): at 12:33

## 2018-02-07 RX ADMIN — Medication 650 MILLIGRAM(S): at 01:38

## 2018-02-07 RX ADMIN — Medication 100 MILLIGRAM(S): at 21:14

## 2018-02-07 RX ADMIN — Medication 100 MILLIGRAM(S): at 05:35

## 2018-02-07 RX ADMIN — OXYCODONE HYDROCHLORIDE 5 MILLIGRAM(S): 5 TABLET ORAL at 12:32

## 2018-02-07 RX ADMIN — OXYCODONE HYDROCHLORIDE 5 MILLIGRAM(S): 5 TABLET ORAL at 13:05

## 2018-02-07 RX ADMIN — Medication 25 MILLIGRAM(S): at 05:35

## 2018-02-07 RX ADMIN — NAFCILLIN 200 GRAM(S): 10 INJECTION, POWDER, FOR SOLUTION INTRAVENOUS at 05:34

## 2018-02-07 RX ADMIN — Medication 25 MILLIGRAM(S): at 17:53

## 2018-02-07 RX ADMIN — NAFCILLIN 200 GRAM(S): 10 INJECTION, POWDER, FOR SOLUTION INTRAVENOUS at 14:23

## 2018-02-07 RX ADMIN — Medication 81 MILLIGRAM(S): at 12:34

## 2018-02-07 RX ADMIN — Medication 667 MILLIGRAM(S): at 08:08

## 2018-02-07 RX ADMIN — Medication 667 MILLIGRAM(S): at 18:16

## 2018-02-07 RX ADMIN — OXYCODONE HYDROCHLORIDE 5 MILLIGRAM(S): 5 TABLET ORAL at 05:33

## 2018-02-07 RX ADMIN — ONDANSETRON 4 MILLIGRAM(S): 8 TABLET, FILM COATED ORAL at 13:25

## 2018-02-07 RX ADMIN — Medication 25 MILLIGRAM(S): at 14:23

## 2018-02-07 RX ADMIN — ATORVASTATIN CALCIUM 80 MILLIGRAM(S): 80 TABLET, FILM COATED ORAL at 21:14

## 2018-02-07 RX ADMIN — NAFCILLIN 200 GRAM(S): 10 INJECTION, POWDER, FOR SOLUTION INTRAVENOUS at 03:26

## 2018-02-07 RX ADMIN — NAFCILLIN 200 GRAM(S): 10 INJECTION, POWDER, FOR SOLUTION INTRAVENOUS at 10:14

## 2018-02-07 NOTE — PROGRESS NOTE ADULT - SUBJECTIVE AND OBJECTIVE BOX
VASCULAR SURGERY PROGRESS NOTE    Procedure: S/P EXCISION OF INFECTED PTFE OBTURATOR GRAFT, AND REDO BYPASS WITH REVERSE SAPH VEIN 1/31, S/P TDC REMOVAL 1/23, NEW  FOCAL DISSECTION OF MID RT EXT ILIAC ART    Events of past 24 hours:  no events overnight    PAST MEDICAL & SURGICAL HISTORY:  S/P LT ILIOFEM BYPASS W PTFE, & LIGATION OF LT CFA 12/2017, S/P LEFT CFA PSA REPAIR WITH PATCH 11/2017, LEFT FEM PSA W/ MULTIPLE DEBRIDEMENS, HTN. ESRD ON HD, CAD    Vital Signs Last 24 Hrs  T(C): 36.9 (05 Feb 2018 07:47), Max: 37.1 (04 Feb 2018 15:44)  T(F): 98.5 (05 Feb 2018 07:47), Max: 98.7 (04 Feb 2018 15:44)  HR: 77 (05 Feb 2018 07:47) (73 - 80)  BP: 159/83 (05 Feb 2018 07:47) (136/67 - 182/84)  BP(mean): --  RR: 18 (05 Feb 2018 07:47) (18 - 19)  SpO2: --    Pain (0-10):            Pain Control Adequate: [X] YES [] N    Diet: Renal    I&O's Detail    04 Feb 2018 07:01  -  05 Feb 2018 07:00  --------------------------------------------------------  IN:    Oral Fluid: 1010 mL  Total IN: 1010 mL    OUT:    Stool: 1 mL    Voided: 750 mL  Total OUT: 751 mL    Total NET: 259 mL      Bowel Movement: [x] YES [] NO  Flatus: [x] YES [] NO    PHYSICAL EXAM    General: NAD  Abdomen: RLQ ABD INCISION W/ STAPLES, C/D/I OPEN TO AIR.  Extremities: LLE WITH BYPASS INCISION OPEN TO AIR, STAPLES IN PLACE, NO ERYTHEMA, NO DISCHARGE, B/L LE WARM, W/ DOP SIG.  Incision: C/D/I, NO DISCHARGE.     MEDICATIONS  (STANDING):  atorvastatin 80 milliGRAM(s) Oral at bedtime  calcium acetate 667 milliGRAM(s) Oral three times a day with meals  docusate sodium 100 milliGRAM(s) Oral three times a day  influenza   Vaccine 0.5 milliLiter(s) IntraMuscular once  metoprolol     tartrate 25 milliGRAM(s) Oral two times a day  nafcillin  IVPB 2 Gram(s) IV Intermittent every 4 hours    MEDICATIONS  (PRN):  acetaminophen   Tablet. 650 milliGRAM(s) Oral every 4 hours PRN Mild Pain (1 - 3)  ondansetron Injectable 4 milliGRAM(s) IV Push every 8 hours PRN Nausea and/or Vomiting  oxyCODONE    IR 5 milliGRAM(s) Oral every 4 hours PRN Moderate Pain (4 - 6)      DVT PROPHYLAXIS: [] YES [X] NO  GI PROPHYLAXIS: [] YES [X] NO  ANTIPLATELETS: [] YES [X] NO  ANTICOAGULATION: [] YES [X] NO  ANTIBIOTICS: [X] YES [] NO - nafcillin    LAB/STUDIES:                        7.6    7.40  )-----------( 409      ( 04 Feb 2018 08:15 )             22.6     02-04    141  |  111<H>  |  36<H>  ----------------------------<  74  3.5   |  20  |  4.6<HH>    Ca    7.7<L>      04 Feb 2018 08:15  Phos  6.6     02-04  Mg     2.0     02-04      ASSESSMENT/PLAN:    - S/P PICC LINE - ANCEF X 6 WEEKS.  - RENAL: no need for RRT  - PT/REHAB: SNF vs. HOME PT  - PODIATRY FOLLOWING.   - ON DISCHARGE WILL START ON [place].    SPECTRA: [id number] VASCULAR SURGERY PROGRESS NOTE    Procedure: S/P EXCISION OF INFECTED PTFE OBTURATOR GRAFT, AND REDO BYPASS WITH REVERSE SAPH VEIN 1/31, S/P TDC REMOVAL 1/23, NEW  FOCAL DISSECTION OF MID RT EXT ILIAC ART    Events of past 24 hours:  no events overnight    PAST MEDICAL & SURGICAL HISTORY:  S/P LT ILIOFEM BYPASS W PTFE, & LIGATION OF LT CFA 12/2017, S/P LEFT CFA PSA REPAIR WITH PATCH 11/2017, LEFT FEM PSA W/ MULTIPLE DEBRIDEMENS, HTN. ESRD ON HD, CAD    Vital Signs Last 24 Hrs      Pain (0-10):            Pain Control Adequate: [X] YES [] N    Diet: Renal    I&O's Detail    Bowel Movement: [x] YES [] NO  Flatus: [x] YES [] NO    PHYSICAL EXAM    General: NAD  Abdomen: RLQ ABD INCISION W/ STAPLES, C/D/I OPEN TO AIR.  Extremities: LLE WITH BYPASS INCISION OPEN TO AIR, STAPLES IN PLACE, NO ERYTHEMA, NO DISCHARGE, B/L LE WARM, W/ DOP SIG.  Incision: C/D/I, NO DISCHARGE.     MEDICATIONS  (STANDING):      MEDICATIONS  (PRN):        DVT PROPHYLAXIS: [] YES [X] NO  GI PROPHYLAXIS: [] YES [X] NO  ANTIPLATELETS: [] YES [X] NO  ANTICOAGULATION: [] YES [X] NO  ANTIBIOTICS: [X] YES [] NO - nafcillin    LAB/STUDIES:        ASSESSMENT/PLAN:    - S/P PICC LINE - ANCEF X 6 WEEKS.  - RENAL: no need for RRT  - PT/REHAB: SNF vs. HOME PT  - PODIATRY FOLLOWING.   - ON DISCHARGE WILL START ON [place].    SPECTRA: [id number] VASCULAR SURGERY PROGRESS NOTE    Procedure: S/P EXCISION OF INFECTED PTFE OBTURATOR GRAFT, AND REDO BYPASS WITH REVERSE SAPH VEIN 1/31, S/P TDC REMOVAL 1/23, NEW  FOCAL DISSECTION OF MID RT EXT ILIAC ART    Events of past 24 hours:  no events overnight    PAST MEDICAL & SURGICAL HISTORY:  S/P LT ILIOFEM BYPASS W PTFE, & LIGATION OF LT CFA 12/2017, S/P LEFT CFA PSA REPAIR WITH PATCH 11/2017, LEFT FEM PSA W/ MULTIPLE DEBRIDEMENS, HTN. ESRD ON HD, CAD    Vital Signs Last 24 Hrs  T(C): 36.1 (07 Feb 2018 07:55), Max: 37.2 (06 Feb 2018 16:00)  T(F): 97 (07 Feb 2018 07:55), Max: 99 (06 Feb 2018 16:00)  HR: 69 (07 Feb 2018 09:30) (65 - 86)  BP: 176/82 (07 Feb 2018 09:30) (176/82 - 210/102)  BP(mean): --  RR: 16 (07 Feb 2018 07:55) (15 - 18)  SpO2: --      Pain (0-10):            Pain Control Adequate: [X] YES [] N    Diet: Renal    I&O's Detail    06 Feb 2018 07:01  -  07 Feb 2018 07:00  --------------------------------------------------------  IN:    IV PiggyBack: 300 mL    Oral Fluid: 120 mL    Packed Red Blood Cells: 537 mL  Total IN: 957 mL    OUT:    Voided: 800 mL  Total OUT: 800 mL    Total NET: 157 mL      07 Feb 2018 07:01  -  07 Feb 2018 11:14  --------------------------------------------------------  IN:    IV PiggyBack: 100 mL  Total IN: 100 mL    OUT:  Total OUT: 0 mL    Total NET: 100 mL    Bowel Movement: [x] YES [] NO  Flatus: [x] YES [] NO    PHYSICAL EXAM  General: NAD  Abdomen: RLQ ABD INCISION W/ STAPLES, C/D/I OPEN TO AIR.  Extremities: LLE WITH BYPASS INCISION OPEN TO AIR, STAPLES IN PLACE, NO ERYTHEMA, NO DISCHARGE, B/L LE WARM, W/ DOP SIG.  Incision: C/D/I, NO DISCHARGE.     MEDICATIONS  (STANDING):  aspirin  chewable 81 milliGRAM(s) Oral daily  atorvastatin 80 milliGRAM(s) Oral at bedtime  calcium acetate 667 milliGRAM(s) Oral three times a day with meals  docusate sodium 100 milliGRAM(s) Oral three times a day  influenza   Vaccine 0.5 milliLiter(s) IntraMuscular once  metoprolol     tartrate 25 milliGRAM(s) Oral two times a day  nafcillin  IVPB 2 Gram(s) IV Intermittent every 4 hours    MEDICATIONS  (PRN):  acetaminophen   Tablet. 650 milliGRAM(s) Oral every 4 hours PRN Mild Pain (1 - 3)  ondansetron Injectable 4 milliGRAM(s) IV Push every 8 hours PRN Nausea and/or Vomiting  oxyCODONE    IR 5 milliGRAM(s) Oral every 4 hours PRN Moderate Pain (4 - 6    DVT PROPHYLAXIS: [] YES [X] NO  GI PROPHYLAXIS: [] YES [X] NO  ANTIPLATELETS: [] YES [X] NO  ANTICOAGULATION: [] YES [X] NO  ANTIBIOTICS: [X] YES [] NO - nafcillin    LAB/STUDIES:                        9.8    7.34  )-----------( 381      ( 07 Feb 2018 04:37 )             28.9     02-06    140  |  110  |  31<H>  ----------------------------<  96  2.8<L>   |  23  |  4.4<HH>    Ca    7.9<L>      06 Feb 2018 12:02  Phos  6.4     02-07  Mg     1.8     02-07      ASSESSMENT/PLAN:    - S/P PICC LINE - ANCEF X 6 WEEKS.  - RENAL: no need for RRT  - PT/REHAB: SNF vs. HOME PT  - PODIATRY FOLLOWING.   - ON DISCHARGE WILL START ON [place].    SPECTRA: [id number]

## 2018-02-07 NOTE — PROGRESS NOTE ADULT - ASSESSMENT
1- DIONICIO /ATN/PIGN on kidney biopsy Creatinine noted 4.6   no need for RRT   repeat BMP, Ip and strict I and O  2- left pseudoaneurysm sp repair and bacteremia on nafcillin for PICC line  3- Hyperphosphatemia on Phoslo continue 1- DIONICIO /ATN/PIGN on kidney biopsy Creatinine noted 4.4 from 4.6   no need for RRT   repeat BMP, Ip and strict I and O  hypokalemia repleted check repeat K   2- left pseudoaneurysm sp repair and bacteremia on Ancef now   3- Hyperphosphatemia on Phoslo continue

## 2018-02-07 NOTE — PROGRESS NOTE ADULT - SUBJECTIVE AND OBJECTIVE BOX
Nephrology progress note    Patient is seen and examined, events over the last 24 h noted .    Allergies:  ciprofloxacin (Rash)  Keflex (Unknown)    Hospital Medications:   MEDICATIONS  (STANDING):  aspirin  chewable 81 milliGRAM(s) Oral daily  atorvastatin 80 milliGRAM(s) Oral at bedtime  calcium acetate 667 milliGRAM(s) Oral three times a day with meals  ceFAZolin   IVPB      ceFAZolin   IVPB 2000 milliGRAM(s) IV Intermittent once  ceFAZolin   IVPB 2000 milliGRAM(s) IV Intermittent every 8 hours  docusate sodium 100 milliGRAM(s) Oral three times a day  influenza   Vaccine 0.5 milliLiter(s) IntraMuscular once  lisinopril 5 milliGRAM(s) Oral daily  metoprolol     tartrate 25 milliGRAM(s) Oral two times a day        VITALS:  T(F): 97.6 (02-07-18 @ 15:09), Max: 98.7 (02-07-18 @ 00:00)  HR: 72 (02-07-18 @ 15:09)  BP: 166/99 (02-07-18 @ 15:09)  RR: 17 (02-07-18 @ 15:09)  SpO2: --  Wt(kg): --    02-05 @ 07:01  -  02-06 @ 07:00  --------------------------------------------------------  IN: 650 mL / OUT: 1200 mL / NET: -550 mL    02-06 @ 07:01  -  02-07 @ 07:00  --------------------------------------------------------  IN: 957 mL / OUT: 800 mL / NET: 157 mL    02-07 @ 07:01  -  02-07 @ 16:48  --------------------------------------------------------  IN: 200 mL / OUT: 301 mL / NET: -101 mL          PHYSICAL EXAM:  Constitutional: NAD  HEENT: anicteric sclera, oropharynx clear, MMM  Neck: No JVD  Respiratory: CTAB, no wheezes, rales or rhonchi  Cardiovascular: S1, S2, RRR  Gastrointestinal: BS+, soft, NT/ND  Extremities: No cyanosis or clubbing. No peripheral edema  Neurological: A/O x 3, no focal deficits  : No CVA tenderness. No tineo.   Skin: No rashes  Vascular Access:    LABS:  02-06    140  |  110  |  31<H>  ----------------------------<  96  2.8<L>   |  23  |  4.4<HH>    Ca    7.9<L>      06 Feb 2018 12:02  Phos  6.4     02-07  Mg     1.8     02-07    Creatinine Trend: 4.4<--, 4.6<--                        10.4   7.26  )-----------( 429      ( 07 Feb 2018 14:33 )             30.2       Urine Studies:      RADIOLOGY & ADDITIONAL STUDIES: Nephrology progress note    Patient is seen and examined, events over the last 24 h noted .  Had oozing from wound site today   No other complaints     Allergies:  ciprofloxacin (Rash)  Keflex (Unknown)    Hospital Medications:   MEDICATIONS  (STANDING):  aspirin  chewable 81 milliGRAM(s) Oral daily  atorvastatin 80 milliGRAM(s) Oral at bedtime  calcium acetate 667 milliGRAM(s) Oral three times a day with meals  ceFAZolin   IVPB      ceFAZolin   IVPB 2000 milliGRAM(s) IV Intermittent once  ceFAZolin   IVPB 2000 milliGRAM(s) IV Intermittent every 8 hours  docusate sodium 100 milliGRAM(s) Oral three times a day  influenza   Vaccine 0.5 milliLiter(s) IntraMuscular once  lisinopril 5 milliGRAM(s) Oral daily  metoprolol     tartrate 25 milliGRAM(s) Oral two times a day        VITALS:  T(F): 97.6 (02-07-18 @ 15:09), Max: 98.7 (02-07-18 @ 00:00)  HR: 72 (02-07-18 @ 15:09)  BP: 166/99 (02-07-18 @ 15:09)  RR: 17 (02-07-18 @ 15:09)  SpO2: --  Wt(kg): --    02-05 @ 07:01  -  02-06 @ 07:00  --------------------------------------------------------  IN: 650 mL / OUT: 1200 mL / NET: -550 mL    02-06 @ 07:01  -  02-07 @ 07:00  --------------------------------------------------------  IN: 957 mL / OUT: 800 mL / NET: 157 mL    02-07 @ 07:01  -  02-07 @ 16:48  --------------------------------------------------------  IN: 200 mL / OUT: 301 mL / NET: -101 mL          PHYSICAL EXAM:  Constitutional: NAD  HEENT: anicteric sclera, oropharynx clear, MMM  Neck: No JVD  Respiratory: CTAB, no wheezes, rales or rhonchi  Cardiovascular: S1, S2, RRR  Gastrointestinal: BS+, soft, NT/ND  Extremities: No cyanosis or clubbing. No peripheral edema       LABS:  02-06    140  |  110  |  31<H>  ----------------------------<  96  2.8<L>   |  23  |  4.4<HH>    Ca    7.9<L>      06 Feb 2018 12:02  Phos  6.4     02-07  Mg     1.8     02-07    Creatinine Trend: 4.4<--, 4.6<--                        10.4   7.26  )-----------( 429      ( 07 Feb 2018 14:33 )             30.2

## 2018-02-08 LAB
ANION GAP SERPL CALC-SCNC: 12 MMOL/L — SIGNIFICANT CHANGE UP (ref 7–14)
BUN SERPL-MCNC: 30 MG/DL — HIGH (ref 10–20)
CALCIUM SERPL-MCNC: 7.8 MG/DL — LOW (ref 8.5–10.1)
CHLORIDE SERPL-SCNC: 108 MMOL/L — SIGNIFICANT CHANGE UP (ref 98–110)
CO2 SERPL-SCNC: 21 MMOL/L — SIGNIFICANT CHANGE UP (ref 17–32)
CREAT SERPL-MCNC: 4 MG/DL — HIGH (ref 0.7–1.5)
GLUCOSE SERPL-MCNC: 79 MG/DL — SIGNIFICANT CHANGE UP (ref 70–110)
HCT VFR BLD CALC: 29.2 % — LOW (ref 37–47)
HGB BLD-MCNC: 10.1 G/DL — LOW (ref 14–18)
MAGNESIUM SERPL-MCNC: 1.7 MG/DL — LOW (ref 1.8–2.4)
MCHC RBC-ENTMCNC: 28.7 PG — SIGNIFICANT CHANGE UP (ref 27–31)
MCHC RBC-ENTMCNC: 34.6 G/DL — SIGNIFICANT CHANGE UP (ref 32–37)
MCV RBC AUTO: 83 FL — SIGNIFICANT CHANGE UP (ref 81–91)
NRBC # BLD: 0 /100 WBCS — SIGNIFICANT CHANGE UP (ref 0–0)
PLATELET # BLD AUTO: 405 K/UL — HIGH (ref 130–400)
POTASSIUM SERPL-MCNC: 2.8 MMOL/L — LOW (ref 3.5–5)
POTASSIUM SERPL-SCNC: 2.8 MMOL/L — LOW (ref 3.5–5)
RBC # BLD: 3.52 M/UL — LOW (ref 4.2–5.4)
RBC # FLD: 15.9 % — HIGH (ref 11.5–14.5)
SODIUM SERPL-SCNC: 141 MMOL/L — SIGNIFICANT CHANGE UP (ref 135–146)
WBC # BLD: 7.5 K/UL — SIGNIFICANT CHANGE UP (ref 4.8–10.8)
WBC # FLD AUTO: 7.5 K/UL — SIGNIFICANT CHANGE UP (ref 4.8–10.8)

## 2018-02-08 RX ORDER — HYDRALAZINE HCL 50 MG
10 TABLET ORAL ONCE
Qty: 0 | Refills: 0 | Status: COMPLETED | OUTPATIENT
Start: 2018-02-08 | End: 2018-02-08

## 2018-02-08 RX ORDER — HYDRALAZINE HCL 50 MG
25 TABLET ORAL ONCE
Qty: 0 | Refills: 0 | Status: COMPLETED | OUTPATIENT
Start: 2018-02-08 | End: 2018-02-08

## 2018-02-08 RX ORDER — AMLODIPINE BESYLATE 2.5 MG/1
10 TABLET ORAL DAILY
Qty: 0 | Refills: 0 | Status: DISCONTINUED | OUTPATIENT
Start: 2018-02-08 | End: 2018-02-10

## 2018-02-08 RX ORDER — LISINOPRIL 2.5 MG/1
10 TABLET ORAL DAILY
Qty: 0 | Refills: 0 | Status: DISCONTINUED | OUTPATIENT
Start: 2018-02-08 | End: 2018-02-10

## 2018-02-08 RX ADMIN — Medication 100 MILLIGRAM(S): at 14:02

## 2018-02-08 RX ADMIN — Medication 650 MILLIGRAM(S): at 16:30

## 2018-02-08 RX ADMIN — LISINOPRIL 5 MILLIGRAM(S): 2.5 TABLET ORAL at 05:59

## 2018-02-08 RX ADMIN — Medication 25 MILLIGRAM(S): at 08:25

## 2018-02-08 RX ADMIN — ATORVASTATIN CALCIUM 80 MILLIGRAM(S): 80 TABLET, FILM COATED ORAL at 22:37

## 2018-02-08 RX ADMIN — Medication 25 MILLIGRAM(S): at 05:59

## 2018-02-08 RX ADMIN — Medication 650 MILLIGRAM(S): at 15:47

## 2018-02-08 RX ADMIN — Medication 10 MILLIGRAM(S): at 20:30

## 2018-02-08 RX ADMIN — Medication 100 MILLIGRAM(S): at 22:37

## 2018-02-08 RX ADMIN — AMLODIPINE BESYLATE 10 MILLIGRAM(S): 2.5 TABLET ORAL at 11:19

## 2018-02-08 RX ADMIN — ONDANSETRON 4 MILLIGRAM(S): 8 TABLET, FILM COATED ORAL at 14:58

## 2018-02-08 RX ADMIN — Medication 100 MILLIGRAM(S): at 05:49

## 2018-02-08 RX ADMIN — Medication 667 MILLIGRAM(S): at 14:02

## 2018-02-08 RX ADMIN — Medication 667 MILLIGRAM(S): at 17:58

## 2018-02-08 RX ADMIN — ONDANSETRON 4 MILLIGRAM(S): 8 TABLET, FILM COATED ORAL at 07:50

## 2018-02-08 RX ADMIN — Medication 25 MILLIGRAM(S): at 17:58

## 2018-02-08 RX ADMIN — Medication 81 MILLIGRAM(S): at 11:16

## 2018-02-08 RX ADMIN — Medication 100 MILLIGRAM(S): at 22:32

## 2018-02-08 RX ADMIN — Medication 650 MILLIGRAM(S): at 12:00

## 2018-02-08 RX ADMIN — Medication 667 MILLIGRAM(S): at 07:50

## 2018-02-08 RX ADMIN — Medication 650 MILLIGRAM(S): at 11:18

## 2018-02-08 RX ADMIN — LISINOPRIL 10 MILLIGRAM(S): 2.5 TABLET ORAL at 11:18

## 2018-02-08 NOTE — PROGRESS NOTE ADULT - ASSESSMENT
DIONICIO/PIGN/ATN/bacteremia/uncontrolled HTN/hypokalemia  # creatinine stable, off HD  # replete K, check repeat at 4 pm  # increase lisinopril to 10 and add norvasc 10 once  a day  # continue phoslo  # remains on ancef  # will follow

## 2018-02-08 NOTE — PROGRESS NOTE ADULT - SUBJECTIVE AND OBJECTIVE BOX
[facility] FOLLOW UP NOTE  --------------------------------------------------------------------------------  Chief Complaint:    24 hour events noted  no new complaints         Standing Inpatient Medications  aspirin  chewable 81 milliGRAM(s) Oral daily  atorvastatin 80 milliGRAM(s) Oral at bedtime  calcium acetate 667 milliGRAM(s) Oral three times a day with meals  ceFAZolin   IVPB      ceFAZolin   IVPB 2000 milliGRAM(s) IV Intermittent every 8 hours  docusate sodium 100 milliGRAM(s) Oral three times a day  influenza   Vaccine 0.5 milliLiter(s) IntraMuscular once  lisinopril 5 milliGRAM(s) Oral daily  metoprolol     tartrate 25 milliGRAM(s) Oral two times a day    PRN Inpatient Medications  acetaminophen   Tablet. 650 milliGRAM(s) Oral every 4 hours PRN  ondansetron Injectable 4 milliGRAM(s) IV Push every 8 hours PRN  oxyCODONE    IR 5 milliGRAM(s) Oral every 4 hours PRN        VITALS/PHYSICAL EXAM  --------------------------------------------------------------------------------  T(C): 36.9 (02-08-18 @ 08:00), Max: 37 (02-07-18 @ 23:57)  HR: 76 (02-08-18 @ 08:00) (64 - 87)  BP: 219/110 (02-08-18 @ 08:00) (166/99 - 219/110)  RR: 18 (02-08-18 @ 08:00) (17 - 18)          02-07-18 @ 07:01  -  02-08-18 @ 07:00  --------------------------------------------------------  IN: 400 mL / OUT: 703 mL / NET: -303 mL      Physical Exam:  	Gen: NAD, well-appearing  	Pulm: CTA B/L  	CV: RRR, S1S2; no rub  	Back: No spinal or CVA tenderness; no sacral edema  	Abd: +BS, soft, nontender/nondistended  	LE: staples LLE  no edema  	  LABS/STUDIES  --------------------------------------------------------------------------------              10.1   7.50  >-----------<  405      [02-08-18 @ 00:54]              29.2     141  |  108  |  30  ----------------------------<  79      [02-08-18 @ 00:54]  2.8   |  21  |  4.0        Ca     7.8     [02-08-18 @ 00:54]      Mg     1.7     [02-08-18 @ 00:54]      Phos  6.2     [02-07-18 @ 14:33]            Creatinine Trend:  SCr 4.0 [02-08 @ 00:54]  SCr 3.9 [02-07 @ 14:33]  SCr 4.4 [02-06 @ 12:02]  SCr 4.6 [02-04 @ 08:15]

## 2018-02-08 NOTE — PROGRESS NOTE ADULT - SUBJECTIVE AND OBJECTIVE BOX
VASCULAR SURGERY PROGRESS NOTE    Procedure: S/P EXCISION OF INFECTED PTFE OBTURATOR GRAFT, AND REDO BYPASS WITH REVERSE SAPH VEIN 1/31, S/P TDC REMOVAL 1/23, NEW  FOCAL DISSECTION OF MID RT EXT ILIAC ART    Events of past 24 hours:  over night pt hypertensive, c/o mild headache and nausea, IV hydralazine pushed.     PAST MEDICAL & SURGICAL HISTORY:  S/P LT ILIOFEM BYPASS W PTFE, & LIGATION OF LT CFA 12/2017, S/P LEFT CFA PSA REPAIR WITH PATCH 11/2017, LEFT FEM PSA W/ MULTIPLE DEBRIDEMENS, HTN. ESRD ON HD, CAD    ICU Vital Signs Last 24 Hrs  T(C): 36.9 (08 Feb 2018 08:00), Max: 37 (07 Feb 2018 23:57)  T(F): 98.4 (08 Feb 2018 08:00), Max: 98.6 (07 Feb 2018 23:57)  HR: 76 (08 Feb 2018 08:00) (64 - 87)  BP: 219/110 (08 Feb 2018 08:00) (166/99 - 219/110)  BP(mean): --  I&O's Detail    Pain (0-10):            Pain Control Adequate: [X] YES [] N    Diet: Renal    I&O's Detail    07 Feb 2018 07:01  -  08 Feb 2018 07:00  --------------------------------------------------------  IN:    IV PiggyBack: 400 mL  Total IN: 400 mL    OUT:    Emesis: 101 mL    Stool: 2 mL    Voided: 600 mL  Total OUT: 703 mL    Total NET: -303 mL    Bowel Movement: [x] YES [] NO  Flatus: [x] YES [] NO    PHYSICAL EXAM  General: NAD  Abdomen: RLQ ABD INCISION W/ [place], mild SEROSANGUINOUS DRAINAGE, NO PUS, NO ERYTHEMA.    Extremities: LLE WITH BYPASS INCISION OPEN TO AIR, STAPLES IN PLACE, NO ERYTHEMA, NO DISCHARGE, B/L LE WARM, W/ DOP SIG.  Incision: C/D/I, NO DISCHARGE.     MEDICATIONS  (STANDING):  aspirin  chewable 81 milliGRAM(s) Oral daily  atorvastatin 80 milliGRAM(s) Oral at bedtime  calcium acetate 667 milliGRAM(s) Oral three times a day with meals  ceFAZolin   IVPB      ceFAZolin   IVPB 2000 milliGRAM(s) IV Intermittent every 8 hours  docusate sodium 100 milliGRAM(s) Oral three times a day  influenza   Vaccine 0.5 milliLiter(s) IntraMuscular once  lisinopril 5 milliGRAM(s) Oral daily  metoprolol     tartrate 25 milliGRAM(s) Oral two times a day    MEDICATIONS  (PRN):  acetaminophen   Tablet. 650 milliGRAM(s) Oral every 4 hours PRN Mild Pain (1 - 3)  ondansetron Injectable 4 milliGRAM(s) IV Push every 8 hours PRN Nausea and/or Vomiting  oxyCODONE    IR 5 milliGRAM(s) Oral every 4 hours PRN Moderate Pain (4 - 6)    DVT PROPHYLAXIS: [] YES [] NO ()  GI PROPHYLAXIS: [] YES [] NO ()  ANTIPLATELETS: [] YES [] NO ()  ANTICOAGULATION: [] YES [] NO ()  ANTIBIOTICS: [] YES [] NO (ceFAZolin   IVPB      ceFAZolin   IVPB 2000 milliGRAM(s) IV Intermittent every 8 hours  )    LAB/STUDIES:                        10.1   7.50  )-----------( 405      ( 08 Feb 2018 00:54 )             29.2     02-08    141  |  108  |  30<H>  ----------------------------<  79  2.8<L>   |  21  |  4.0<H>    Ca    7.8<L>      08 Feb 2018 00:54  Phos  6.2     02-07  Mg     1.7     02-08    ASSESSMENT/PLAN:    - HTN: ADDED ON LISINOPRIL, HYDRALAZINE   - S/P PICC LINE - ANCEF X 6 WEEKS.  - RENAL: no need for RRT  - PT/REHAB: SNF vs. HOME PT  - PODIATRY FOLLOWING.   - ON DISCHARGE WILL START ON BRILINTA.    SPECTRA: [id number]

## 2018-02-09 LAB
ANION GAP SERPL CALC-SCNC: 10 MMOL/L — SIGNIFICANT CHANGE UP (ref 7–14)
BUN SERPL-MCNC: 23 MG/DL — HIGH (ref 10–20)
CALCIUM SERPL-MCNC: 8.1 MG/DL — LOW (ref 8.5–10.1)
CHLORIDE SERPL-SCNC: 105 MMOL/L — SIGNIFICANT CHANGE UP (ref 98–110)
CO2 SERPL-SCNC: 24 MMOL/L — SIGNIFICANT CHANGE UP (ref 17–32)
CREAT SERPL-MCNC: 3.7 MG/DL — HIGH (ref 0.7–1.5)
GLUCOSE SERPL-MCNC: 104 MG/DL — SIGNIFICANT CHANGE UP (ref 70–110)
HCT VFR BLD CALC: 28.5 % — LOW (ref 37–47)
HGB BLD-MCNC: 9.7 G/DL — LOW (ref 14–18)
MAGNESIUM SERPL-MCNC: 1.5 MG/DL — LOW (ref 1.8–2.4)
MCHC RBC-ENTMCNC: 28.4 PG — SIGNIFICANT CHANGE UP (ref 27–31)
MCHC RBC-ENTMCNC: 34 G/DL — SIGNIFICANT CHANGE UP (ref 32–37)
MCV RBC AUTO: 83.6 FL — SIGNIFICANT CHANGE UP (ref 81–91)
NRBC # BLD: 0 /100 WBCS — SIGNIFICANT CHANGE UP (ref 0–0)
PLATELET # BLD AUTO: 410 K/UL — HIGH (ref 130–400)
POTASSIUM SERPL-MCNC: 2.4 MMOL/L — CRITICAL LOW (ref 3.5–5)
POTASSIUM SERPL-SCNC: 2.4 MMOL/L — CRITICAL LOW (ref 3.5–5)
RBC # BLD: 3.41 M/UL — LOW (ref 4.2–5.4)
RBC # FLD: 15.6 % — HIGH (ref 11.5–14.5)
SODIUM SERPL-SCNC: 139 MMOL/L — SIGNIFICANT CHANGE UP (ref 135–146)
WBC # BLD: 8.03 K/UL — SIGNIFICANT CHANGE UP (ref 4.8–10.8)
WBC # FLD AUTO: 8.03 K/UL — SIGNIFICANT CHANGE UP (ref 4.8–10.8)

## 2018-02-09 RX ORDER — LABETALOL HCL 100 MG
10 TABLET ORAL ONCE
Qty: 0 | Refills: 0 | Status: COMPLETED | OUTPATIENT
Start: 2018-02-09 | End: 2018-02-09

## 2018-02-09 RX ORDER — POTASSIUM CHLORIDE 20 MEQ
20 PACKET (EA) ORAL ONCE
Qty: 0 | Refills: 0 | Status: COMPLETED | OUTPATIENT
Start: 2018-02-09 | End: 2018-02-09

## 2018-02-09 RX ORDER — MAGNESIUM SULFATE 500 MG/ML
1 VIAL (ML) INJECTION ONCE
Qty: 0 | Refills: 0 | Status: COMPLETED | OUTPATIENT
Start: 2018-02-09 | End: 2018-02-09

## 2018-02-09 RX ORDER — HYDRALAZINE HCL 50 MG
25 TABLET ORAL EVERY 6 HOURS
Qty: 0 | Refills: 0 | Status: DISCONTINUED | OUTPATIENT
Start: 2018-02-09 | End: 2018-02-10

## 2018-02-09 RX ORDER — MAGNESIUM OXIDE 400 MG ORAL TABLET 241.3 MG
400 TABLET ORAL EVERY 12 HOURS
Qty: 0 | Refills: 0 | Status: DISCONTINUED | OUTPATIENT
Start: 2018-02-09 | End: 2018-02-20

## 2018-02-09 RX ORDER — METOPROLOL TARTRATE 50 MG
25 TABLET ORAL EVERY 8 HOURS
Qty: 0 | Refills: 0 | Status: DISCONTINUED | OUTPATIENT
Start: 2018-02-09 | End: 2018-02-10

## 2018-02-09 RX ADMIN — Medication 667 MILLIGRAM(S): at 18:49

## 2018-02-09 RX ADMIN — Medication 650 MILLIGRAM(S): at 20:52

## 2018-02-09 RX ADMIN — Medication 100 MILLIGRAM(S): at 15:52

## 2018-02-09 RX ADMIN — Medication 81 MILLIGRAM(S): at 14:28

## 2018-02-09 RX ADMIN — Medication 100 MILLIGRAM(S): at 15:50

## 2018-02-09 RX ADMIN — Medication 650 MILLIGRAM(S): at 21:30

## 2018-02-09 RX ADMIN — Medication 25 MILLIGRAM(S): at 18:14

## 2018-02-09 RX ADMIN — Medication 10 MILLIGRAM(S): at 10:19

## 2018-02-09 RX ADMIN — Medication 100 MILLIGRAM(S): at 05:54

## 2018-02-09 RX ADMIN — ATORVASTATIN CALCIUM 80 MILLIGRAM(S): 80 TABLET, FILM COATED ORAL at 22:29

## 2018-02-09 RX ADMIN — LISINOPRIL 10 MILLIGRAM(S): 2.5 TABLET ORAL at 05:54

## 2018-02-09 RX ADMIN — Medication 650 MILLIGRAM(S): at 12:23

## 2018-02-09 RX ADMIN — Medication 25 MILLIGRAM(S): at 14:28

## 2018-02-09 RX ADMIN — AMLODIPINE BESYLATE 10 MILLIGRAM(S): 2.5 TABLET ORAL at 05:54

## 2018-02-09 RX ADMIN — Medication 667 MILLIGRAM(S): at 07:49

## 2018-02-09 RX ADMIN — Medication 25 MILLIGRAM(S): at 23:58

## 2018-02-09 RX ADMIN — Medication 25 MILLIGRAM(S): at 22:29

## 2018-02-09 RX ADMIN — Medication 25 MILLIGRAM(S): at 05:55

## 2018-02-09 RX ADMIN — Medication 100 MILLIGRAM(S): at 22:29

## 2018-02-09 RX ADMIN — Medication 50 MILLIEQUIVALENT(S): at 20:52

## 2018-02-09 RX ADMIN — ONDANSETRON 4 MILLIGRAM(S): 8 TABLET, FILM COATED ORAL at 14:28

## 2018-02-09 RX ADMIN — Medication 667 MILLIGRAM(S): at 12:06

## 2018-02-09 RX ADMIN — Medication 650 MILLIGRAM(S): at 13:30

## 2018-02-09 RX ADMIN — Medication 100 GRAM(S): at 19:09

## 2018-02-09 NOTE — PROGRESS NOTE ADULT - ASSESSMENT
DIONICIO/PIGN/ATN/bacteremia/uncontrolled HTN/hypokalemia  # creatinine stable, off HD  # replete K, check repeat at 4 pm  # increase lisinopril to 10 and add norvasc 10 once  a day  # continue phoslo  # remains on ancef  # will follow DIONICIO/PIGN/ATN/bacteremia/uncontrolled HTN/hypokalemia  # creatinine stable, off HD  # replete K, check repeat stat and add Mg oxide 500 mg po q12h  # on lisinopril to 10 and add norvasc 10 once  a day  # continue phoslo  # remains on ancef  # will follow

## 2018-02-09 NOTE — PROGRESS NOTE ADULT - SUBJECTIVE AND OBJECTIVE BOX
VASCULAR SURGERY PROGRESS NOTE    Procedure: S/P EXCISION OF INFECTED PTFE OBTURATOR GRAFT, AND REDO BYPASS WITH REVERSE SAPH VEIN 1/31, S/P TDC REMOVAL 1/23, NEW  FOCAL DISSECTION OF MID RT EXT ILIAC ART    Events of past 24 hours:  over night pt hypertensive, IV hydralazine pushed.     PAST MEDICAL & SURGICAL HISTORY:  S/P LT ILIOFEM BYPASS W PTFE, & LIGATION OF LT CFA 12/2017, S/P LEFT CFA PSA REPAIR WITH PATCH 11/2017, LEFT FEM PSA W/ MULTIPLE DEBRIDEMENS, HTN. ESRD ON HD, CAD    Vital Signs Last 24 Hrs  T(C): 36.6 (08 Feb 2018 15:46), Max: 36.9 (08 Feb 2018 08:00)  T(F): 97.9 (08 Feb 2018 15:46), Max: 98.4 (08 Feb 2018 08:00)  HR: 90 (09 Feb 2018 05:47) (75 - 90)  BP: 180/90 (09 Feb 2018 05:47) (170/90 - 219/110)  BP(mean): --  RR: 18 (09 Feb 2018 05:47) (18 - 19)  SpO2: 96% (09 Feb 2018 05:47) (96% - 96%)      Pain (0-10):            Pain Control Adequate: [X] YES [] N    Diet: Renal    I&O's Detail    07 Feb 2018 07:01  -  08 Feb 2018 07:00  --------------------------------------------------------  IN:    IV PiggyBack: 400 mL  Total IN: 400 mL    OUT:    Emesis: 101 mL    Stool: 2 mL    Voided: 600 mL  Total OUT: 703 mL    Total NET: -303 mL      08 Feb 2018 07:01  -  09 Feb 2018 06:18  --------------------------------------------------------  IN:    IV PiggyBack: 50 mL  Total IN: 50 mL    OUT:    Stool: 2 mL    Voided: 1450 mL  Total OUT: 1452 mL    Total NET: -1402 mL    Bowel Movement: [x] YES [] NO  Flatus: [x] YES [] NO    PHYSICAL EXAM  General: NAD  Abdomen: RLQ ABD INCISION W/ [place], mild SEROSANGUINOUS DRAINAGE, NO PUS, NO ERYTHEMA.    Extremities: LLE WITH BYPASS INCISION OPEN TO AIR, STAPLES IN PLACE, NO ERYTHEMA, NO DISCHARGE, B/L LE WARM, W/ DOP SIG.  Incision: C/D/I, NO DISCHARGE.     MEDICATIONS  (STANDING):  amLODIPine   Tablet 10 milliGRAM(s) Oral daily  aspirin  chewable 81 milliGRAM(s) Oral daily  atorvastatin 80 milliGRAM(s) Oral at bedtime  calcium acetate 667 milliGRAM(s) Oral three times a day with meals  ceFAZolin   IVPB      ceFAZolin   IVPB 2000 milliGRAM(s) IV Intermittent every 8 hours  docusate sodium 100 milliGRAM(s) Oral three times a day  influenza   Vaccine 0.5 milliLiter(s) IntraMuscular once  lisinopril 10 milliGRAM(s) Oral daily  metoprolol     tartrate 25 milliGRAM(s) Oral two times a day    MEDICATIONS  (PRN):  acetaminophen   Tablet. 650 milliGRAM(s) Oral every 4 hours PRN Mild Pain (1 - 3)  ondansetron Injectable 4 milliGRAM(s) IV Push every 8 hours PRN Nausea and/or Vomiting  oxyCODONE    IR 5 milliGRAM(s) Oral every 4 hours PRN Moderate Pain (4 - 6)    DVT PROPHYLAXIS: [] YES [] NO ()  GI PROPHYLAXIS: [] YES [] NO ()  ANTIPLATELETS: [] YES [] NO (aspirin  chewable 81 milliGRAM(s)  )  ANTICOAGULATION: [] YES [] NO ()  ANTIBIOTICS: [] YES [] NO (ceFAZolin   IVPB    ceFAZolin   IVPB 2000 milliGRAM(s)  )      LAB/STUDIES:                        10.1   7.50  )-----------( 405      ( 08 Feb 2018 00:54 )             29.2     02-08    141  |  108  |  30<H>  ----------------------------<  79  2.8<L>   |  21  |  4.0<H>    Ca    7.8<L>      08 Feb 2018 00:54  Phos  6.2     02-07  Mg     1.7     02-08    ASSESSMENT/PLAN:    - HTN: INCREASE DOSE OF  LISINOPRIL, ADDED NORVASC , HYDRALAZINE PUSHES PRN.   - S/P PICC LINE - ANCEF X 6 WEEKS.  - RENAL: no need for RRT  - PT/REHAB: SNF vs. HOME PT  - PODIATRY FOLLOWING.   - HOLD BRILLINTA FOR NOW    SPECTRA: [id number]

## 2018-02-09 NOTE — CHART NOTE - NSCHARTNOTEFT_GEN_A_CORE
Registered Dietitian Follow-Up     Patient Profile Reviewed                           Yes [x]   No []     Nutrition History Previously Obtained        Yes [x]  No []       Pertinent Subjective Information: Per Patient report:  (1) Fair appetite. Consuming about vs less than 50% of meals. Nausea ongoing for few days now, on ondansetron. Diarrhea reported previously is ongoing. LBM 2/8. Previously agreed to Ensure Enlive Vanilla or Chocolate q24hr with PRE-renal diet (due to GFR 11)     Pertinent Medical Interventions: Per Progress notes by Vasc and Nephro,  (1) S/P EXCISION OF INFECTED PTFE OBTURATOR GRAFT, AND REDO BYPASS WITH REVERSE SAPH VEIN 1/31, S/P TDC REMOVAL 1/23, NEW  FOCAL DISSECTION OF MID RT EXT ILIAC ART.  (2) s/p PICC line, no need for RRT per Renal. SNF vs home PT. Podiatry following for admitted cellulitis/abscess.      Diet order:  Renal Dialysis with 2g Sodium.      Anthropometrics:  - Ht.  - Wt.  No new weight documented. last weight documented was 2/3.   - %wt change  - BMI  - IBW     Pertinent Lab Data:  2/8: H/H 10.1/29.1, K 2.8, BUN 30, Cr 4.0, Mag 1.7    Pertinent Meds:  Abx, oxy, ondansetron, metoprolol, docusate, atorvastatin, acetaminophen, amlodipine     Physical Findings:  - Appearance: appears lethargic vs just woke up. no edema noted.   - GI function:  Pt reported diarrhea ongoing. LBM 2/8  - Tubes:  - Oral/Mouth cavity:  none reported.  - Skin: admitted abscess and cellulitis of foot     Nutrition Requirements  Weight Used: Same as previously     Estimated Energy and Protein Needs    Continue [x]  Adjust []    8485-9014 kcal/day  55-66 g/day  (lower due to low GFR)  1ml/kcal       Nutrient Intake: Currently patient is not meeting kcal/protein needs        [x] Previous Nutrition Diagnosis:  (1) Inadequate energy-protein intake - REMAINS            [x] Ongoing          [] Resolved          Nutrition Intervention: Oral intake with supplements     Goal/Expected Outcome: Pt to consume and tolerate >75% of all meals and supplements upon f/u due 2/13.      Indicator/Monitoring: RD to monitor diet order, energy intake, body composition, nutrition focused physical findings (BM, appetite).    Recs: (1) Change diet to Pre-Renal (low protein, 60g), and Low Sodium. (2) Order Ensure Enlive Vanilla q24hr.

## 2018-02-09 NOTE — PROGRESS NOTE ADULT - SUBJECTIVE AND OBJECTIVE BOX
Nephrology progress note    Patient is seen and examined, events over the last 24 h noted .    Allergies:  ciprofloxacin (Rash)  Keflex (Unknown)    Hospital Medications:   MEDICATIONS  (STANDING):  amLODIPine   Tablet 10 milliGRAM(s) Oral daily  aspirin  chewable 81 milliGRAM(s) Oral daily  atorvastatin 80 milliGRAM(s) Oral at bedtime  calcium acetate 667 milliGRAM(s) Oral three times a day with meals  ceFAZolin   IVPB      ceFAZolin   IVPB 2000 milliGRAM(s) IV Intermittent every 8 hours  docusate sodium 100 milliGRAM(s) Oral three times a day  influenza   Vaccine 0.5 milliLiter(s) IntraMuscular once  lisinopril 10 milliGRAM(s) Oral daily  metoprolol     tartrate 25 milliGRAM(s) Oral every 8 hours        VITALS:  T(F): 97.8 (02-09-18 @ 07:47), Max: 97.9 (02-08-18 @ 15:46)  HR: 90 (02-09-18 @ 07:47)  BP: 180/105 (02-09-18 @ 12:10)  RR: 18 (02-09-18 @ 07:47)  SpO2: 96% (02-09-18 @ 05:47)  Wt(kg): --    02-07 @ 07:01  -  02-08 @ 07:00  --------------------------------------------------------  IN: 400 mL / OUT: 703 mL / NET: -303 mL    02-08 @ 07:01  -  02-09 @ 07:00  --------------------------------------------------------  IN: 150 mL / OUT: 1452 mL / NET: -1302 mL          PHYSICAL EXAM:  Constitutional: NAD  HEENT: anicteric sclera, oropharynx clear, MMM  Neck: No JVD  Respiratory: CTAB, no wheezes, rales or rhonchi  Cardiovascular: S1, S2, RRR  Gastrointestinal: BS+, soft, NT/ND  Extremities: No cyanosis or clubbing. No peripheral edema  Neurological: A/O x 3, no focal deficits  : No CVA tenderness. No tineo.   Skin: No rashes  Vascular Access:    LABS:  02-08    141  |  108  |  30<H>  ----------------------------<  79  2.8<L>   |  21  |  4.0<H>    Ca    7.8<L>      08 Feb 2018 00:54  Phos  6.2     02-07  Mg     1.7     02-08    Creatinine Trend: 4.0<--, 3.9<--, 4.4<--, 4.6<--                        10.1   7.50  )-----------( 405      ( 08 Feb 2018 00:54 )             29.2 Nephrology progress note    Patient is seen and examined, events over the last 24 h noted .  No complaints is asymptomatic     Allergies:  ciprofloxacin (Rash)  Keflex (Unknown)    Hospital Medications:   MEDICATIONS  (STANDING):  amLODIPine   Tablet 10 milliGRAM(s) Oral daily  aspirin  chewable 81 milliGRAM(s) Oral daily  atorvastatin 80 milliGRAM(s) Oral at bedtime  calcium acetate 667 milliGRAM(s) Oral three times a day with meals  ceFAZolin   IVPB      ceFAZolin   IVPB 2000 milliGRAM(s) IV Intermittent every 8 hours  docusate sodium 100 milliGRAM(s) Oral three times a day  influenza   Vaccine 0.5 milliLiter(s) IntraMuscular once  lisinopril 10 milliGRAM(s) Oral daily  metoprolol     tartrate 25 milliGRAM(s) Oral every 8 hours        VITALS:  T(F): 97.8 (02-09-18 @ 07:47), Max: 97.9 (02-08-18 @ 15:46)  HR: 90 (02-09-18 @ 07:47)  BP: 180/105 (02-09-18 @ 12:10)  RR: 18 (02-09-18 @ 07:47)  SpO2: 96% (02-09-18 @ 05:47)  Wt(kg): --    02-07 @ 07:01  -  02-08 @ 07:00  --------------------------------------------------------  IN: 400 mL / OUT: 703 mL / NET: -303 mL    02-08 @ 07:01  -  02-09 @ 07:00  --------------------------------------------------------  IN: 150 mL / OUT: 1452 mL / NET: -1302 mL          PHYSICAL EXAM:  Constitutional: NAD  HEENT: anicteric sclera, oropharynx clear, MMM  Neck: No JVD  Respiratory: CTAB, no wheezes, rales or rhonchi  Cardiovascular: S1, S2, RRR  Gastrointestinal: BS+, soft, NT/ND  Extremities: No cyanosis or clubbing. No peripheral edema  Vascular Access: left arm PICC line     LABS:  02-08    141  |  108  |  30<H>  ----------------------------<  79  2.8<L>   |  21  |  4.0<H>    Ca    7.8<L>      08 Feb 2018 00:54  Phos  6.2     02-07  Mg     1.7     02-08    Creatinine Trend: 4.0<--, 3.9<--, 4.4<--, 4.6<--                        10.1   7.50  )-----------( 405      ( 08 Feb 2018 00:54 )             29.2

## 2018-02-10 LAB
ANION GAP SERPL CALC-SCNC: 8 MMOL/L — SIGNIFICANT CHANGE UP (ref 7–14)
ANION GAP SERPL CALC-SCNC: 9 MMOL/L — SIGNIFICANT CHANGE UP (ref 7–14)
BUN SERPL-MCNC: 25 MG/DL — HIGH (ref 10–20)
CALCIUM SERPL-MCNC: 8.4 MG/DL — LOW (ref 8.5–10.1)
CHLORIDE SERPL-SCNC: 104 MMOL/L — SIGNIFICANT CHANGE UP (ref 98–110)
CHLORIDE SERPL-SCNC: 112 MMOL/L — HIGH (ref 98–110)
CO2 SERPL-SCNC: 23 MMOL/L — SIGNIFICANT CHANGE UP (ref 17–32)
CO2 SERPL-SCNC: 25 MMOL/L — SIGNIFICANT CHANGE UP (ref 17–32)
CREAT SERPL-MCNC: 3.7 MG/DL — HIGH (ref 0.7–1.5)
GLUCOSE SERPL-MCNC: 87 MG/DL — SIGNIFICANT CHANGE UP (ref 70–110)
MAGNESIUM SERPL-MCNC: 1.8 MG/DL — SIGNIFICANT CHANGE UP (ref 1.8–2.4)
MAGNESIUM SERPL-MCNC: 1.9 MG/DL — SIGNIFICANT CHANGE UP (ref 1.8–2.4)
POTASSIUM SERPL-MCNC: 2.7 MMOL/L — CRITICAL LOW (ref 3.5–5)
POTASSIUM SERPL-MCNC: 3.1 MMOL/L — LOW (ref 3.5–5)
POTASSIUM SERPL-SCNC: 2.7 MMOL/L — CRITICAL LOW (ref 3.5–5)
POTASSIUM SERPL-SCNC: 3.1 MMOL/L — LOW (ref 3.5–5)
SODIUM SERPL-SCNC: 138 MMOL/L — SIGNIFICANT CHANGE UP (ref 135–146)
SODIUM SERPL-SCNC: 143 MMOL/L — SIGNIFICANT CHANGE UP (ref 135–146)

## 2018-02-10 RX ORDER — HYDRALAZINE HCL 50 MG
50 TABLET ORAL EVERY 6 HOURS
Qty: 0 | Refills: 0 | Status: DISCONTINUED | OUTPATIENT
Start: 2018-02-10 | End: 2018-02-20

## 2018-02-10 RX ORDER — METOPROLOL TARTRATE 50 MG
75 TABLET ORAL EVERY 8 HOURS
Qty: 0 | Refills: 0 | Status: DISCONTINUED | OUTPATIENT
Start: 2018-02-10 | End: 2018-02-11

## 2018-02-10 RX ORDER — POTASSIUM CHLORIDE 20 MEQ
40 PACKET (EA) ORAL ONCE
Qty: 0 | Refills: 0 | Status: COMPLETED | OUTPATIENT
Start: 2018-02-10 | End: 2018-02-10

## 2018-02-10 RX ORDER — AMLODIPINE BESYLATE 2.5 MG/1
10 TABLET ORAL DAILY
Qty: 0 | Refills: 0 | Status: DISCONTINUED | OUTPATIENT
Start: 2018-02-10 | End: 2018-02-11

## 2018-02-10 RX ORDER — METOPROLOL TARTRATE 50 MG
50 TABLET ORAL EVERY 8 HOURS
Qty: 0 | Refills: 0 | Status: DISCONTINUED | OUTPATIENT
Start: 2018-02-10 | End: 2018-02-11

## 2018-02-10 RX ORDER — LISINOPRIL 2.5 MG/1
20 TABLET ORAL DAILY
Qty: 0 | Refills: 0 | Status: DISCONTINUED | OUTPATIENT
Start: 2018-02-10 | End: 2018-02-20

## 2018-02-10 RX ORDER — MAGNESIUM SULFATE 500 MG/ML
1 VIAL (ML) INJECTION ONCE
Qty: 0 | Refills: 0 | Status: COMPLETED | OUTPATIENT
Start: 2018-02-10 | End: 2018-02-10

## 2018-02-10 RX ADMIN — Medication 25 MILLIGRAM(S): at 06:37

## 2018-02-10 RX ADMIN — Medication 100 MILLIGRAM(S): at 22:25

## 2018-02-10 RX ADMIN — Medication 81 MILLIGRAM(S): at 15:08

## 2018-02-10 RX ADMIN — Medication 100 GRAM(S): at 17:31

## 2018-02-10 RX ADMIN — Medication 650 MILLIGRAM(S): at 06:47

## 2018-02-10 RX ADMIN — LISINOPRIL 20 MILLIGRAM(S): 2.5 TABLET ORAL at 10:02

## 2018-02-10 RX ADMIN — Medication 75 MILLIGRAM(S): at 17:32

## 2018-02-10 RX ADMIN — ATORVASTATIN CALCIUM 80 MILLIGRAM(S): 80 TABLET, FILM COATED ORAL at 22:26

## 2018-02-10 RX ADMIN — Medication 75 MILLIGRAM(S): at 22:33

## 2018-02-10 RX ADMIN — Medication 100 MILLIGRAM(S): at 15:08

## 2018-02-10 RX ADMIN — AMLODIPINE BESYLATE 10 MILLIGRAM(S): 2.5 TABLET ORAL at 06:37

## 2018-02-10 RX ADMIN — Medication 100 MILLIGRAM(S): at 06:37

## 2018-02-10 RX ADMIN — Medication 667 MILLIGRAM(S): at 10:02

## 2018-02-10 RX ADMIN — LISINOPRIL 10 MILLIGRAM(S): 2.5 TABLET ORAL at 06:37

## 2018-02-10 RX ADMIN — Medication 50 MILLIGRAM(S): at 10:02

## 2018-02-10 RX ADMIN — Medication 50 MILLIGRAM(S): at 15:55

## 2018-02-10 RX ADMIN — MAGNESIUM OXIDE 400 MG ORAL TABLET 400 MILLIGRAM(S): 241.3 TABLET ORAL at 18:59

## 2018-02-10 RX ADMIN — AMLODIPINE BESYLATE 10 MILLIGRAM(S): 2.5 TABLET ORAL at 17:31

## 2018-02-10 RX ADMIN — Medication 667 MILLIGRAM(S): at 17:30

## 2018-02-10 RX ADMIN — Medication 650 MILLIGRAM(S): at 17:33

## 2018-02-10 RX ADMIN — Medication 50 MILLIGRAM(S): at 17:30

## 2018-02-10 RX ADMIN — Medication 40 MILLIEQUIVALENT(S): at 22:27

## 2018-02-10 NOTE — PROGRESS NOTE ADULT - ASSESSMENT
DIONICIO/PIGN/ATN/bacteremia/uncontrolled HTN/hypokalemia  # creatinine stable, off HD  # replete K, check repeat stat and add Mg oxide 500 mg po q12h  # on lisinopril to 10 and add norvasc 10 once  a day  # continue phoslo  # remains on ancef  # will follow DIONICIO/PIGN/ATN/bacteremia/uncontrolled HTN/hypokalemia  # creatinine better off HD  # replete K, check repeat stat and add Mg oxide 500 mg po q12h  # on lisinopril  10 and add norvasc 10 once  a day  # continue phoslo  # remains on ancef  # will follow

## 2018-02-10 NOTE — PROGRESS NOTE ADULT - SUBJECTIVE AND OBJECTIVE BOX
Nephrology progress note    Patient is seen and examined, events over the last 24 h noted .    Allergies:  ciprofloxacin (Rash)  Keflex (Unknown)    Hospital Medications:   MEDICATIONS  (STANDING):  aspirin  chewable 81 milliGRAM(s) Oral daily  atorvastatin 80 milliGRAM(s) Oral at bedtime  calcium acetate 667 milliGRAM(s) Oral three times a day with meals  ceFAZolin   IVPB      ceFAZolin   IVPB 2000 milliGRAM(s) IV Intermittent every 8 hours  docusate sodium 100 milliGRAM(s) Oral three times a day  hydrALAZINE 50 milliGRAM(s) Oral every 6 hours  influenza   Vaccine 0.5 milliLiter(s) IntraMuscular once  lisinopril 20 milliGRAM(s) Oral daily  magnesium oxide 400 milliGRAM(s) Oral every 12 hours  metoprolol     tartrate 50 milliGRAM(s) Oral every 8 hours        VITALS:  T(F): 97.4 (02-10-18 @ 07:58), Max: 98.2 (02-09-18 @ 23:38)  HR: 72 (02-09-18 @ 23:38)  BP: 196/96 (02-10-18 @ 07:58)  RR: 18 (02-10-18 @ 07:58)  SpO2: --  Wt(kg): --    02-08 @ 07:01  -  02-09 @ 07:00  --------------------------------------------------------  IN: 150 mL / OUT: 1452 mL / NET: -1302 mL    02-09 @ 07:01  -  02-10 @ 07:00  --------------------------------------------------------  IN: 150 mL / OUT: 452 mL / NET: -302 mL          PHYSICAL EXAM:  Constitutional: NAD  HEENT: anicteric sclera, oropharynx clear, MMM  Neck: No JVD  Respiratory: CTAB, no wheezes, rales or rhonchi  Cardiovascular: S1, S2, RRR  Gastrointestinal: BS+, soft, NT/ND  Extremities: No cyanosis or clubbing. No peripheral edema  Neurological: A/O x 3, no focal deficits  : No CVA tenderness. No tineo.   Skin: No rashes  Vascular Access:    LABS:  02-09    143  |  112<H>  |  25<H>  ----------------------------<  87  3.1<L>   |  23  |  3.7<H>    Ca    8.4<L>      09 Feb 2018 21:47  Mg     1.9     02-09    Creatinine Trend: 3.7<--, 3.7<--, 4.0<--, 3.9<--, 4.4<--, 4.6<--                        9.7    8.03  )-----------( 410      ( 09 Feb 2018 13:06 )             28.5       Urine Studies:      RADIOLOGY & ADDITIONAL STUDIES: Nephrology progress note    Patient is seen and examined, events over the last 24 h noted .  No complaints  No events    Allergies:  ciprofloxacin (Rash)  Keflex (Unknown)    Hospital Medications:   MEDICATIONS  (STANDING):  aspirin  chewable 81 milliGRAM(s) Oral daily  atorvastatin 80 milliGRAM(s) Oral at bedtime  calcium acetate 667 milliGRAM(s) Oral three times a day with meals  ceFAZolin   IVPB      ceFAZolin   IVPB 2000 milliGRAM(s) IV Intermittent every 8 hours  docusate sodium 100 milliGRAM(s) Oral three times a day  hydrALAZINE 50 milliGRAM(s) Oral every 6 hours  influenza   Vaccine 0.5 milliLiter(s) IntraMuscular once  lisinopril 20 milliGRAM(s) Oral daily  magnesium oxide 400 milliGRAM(s) Oral every 12 hours  metoprolol     tartrate 50 milliGRAM(s) Oral every 8 hours        VITALS:  T(F): 97.4 (02-10-18 @ 07:58), Max: 98.2 (02-09-18 @ 23:38)  HR: 72 (02-09-18 @ 23:38)  BP: 196/96 (02-10-18 @ 07:58)  RR: 18 (02-10-18 @ 07:58)  SpO2: --  Wt(kg): --    02-08 @ 07:01  -  02-09 @ 07:00  --------------------------------------------------------  IN: 150 mL / OUT: 1452 mL / NET: -1302 mL    02-09 @ 07:01  -  02-10 @ 07:00  --------------------------------------------------------  IN: 150 mL / OUT: 452 mL / NET: -302 mL          PHYSICAL EXAM:  Constitutional: NAD  HEENT: anicteric sclera, oropharynx clear, MMM  Neck: No JVD  Respiratory: CTAB, no wheezes, rales or rhonchi  Cardiovascular: S1, S2, RRR  Gastrointestinal: BS+, soft, NT/ND  Extremities: No cyanosis or clubbing. No peripheral edema  Neurological: A/O x 3, no focal deficits      LABS:  02-09    143  |  112<H>  |  25<H>  ----------------------------<  87  3.1<L>   |  23  |  3.7<H>    Ca    8.4<L>      09 Feb 2018 21:47  Mg     1.9     02-09    Creatinine Trend: 3.7<--, 3.7<--, 4.0<--, 3.9<--, 4.4<--, 4.6<--                        9.7    8.03  )-----------( 410      ( 09 Feb 2018 13:06 )             28.5       Urine Studies:      RADIOLOGY & ADDITIONAL STUDIES:

## 2018-02-10 NOTE — PROGRESS NOTE ADULT - SUBJECTIVE AND OBJECTIVE BOX
Podiatry follow up:    Please maintain betadine DSD dressing to the Left foot.  Podiatry to follow up q48h.

## 2018-02-10 NOTE — PROGRESS NOTE ADULT - SUBJECTIVE AND OBJECTIVE BOX
VASCULAR SURGERY PROGRESS NOTE    Procedure: S/P EXCISION OF INFECTED PTFE OBTURATOR GRAFT, AND REDO BYPASS WITH REVERSE SAPH VEIN , S/P TDC REMOVAL , NEW  FOCAL DISSECTION OF MID RT EXT ILIAC ART    Events of past 24 hours:  patients bp medications were adjusted to get her BP under control k and mg low so repleated and labs repeated    PAST MEDICAL & SURGICAL HISTORY:  S/P LT ILIOFEM BYPASS W PTFE, & LIGATION OF LT CFA 2017, S/P LEFT CFA PSA REPAIR WITH PATCH 2017, LEFT FEM PSA W/ MULTIPLE DEBRIDEMENS, HTN. ESRD ON HD, CAD      Vital Signs Last 24 Hrs  T(C): 36.8 (2018 23:38), Max: 36.8 (2018 23:38)  T(F): 98.2 (2018 23:38), Max: 98.2 (2018 23:38)  HR: 72 (2018 23:38) (72 - 90)  BP: 177/92 (2018 23:38) (140/90 - 194/101)  BP(mean): --  RR: 18 (2018 23:38) (18 - 18)  SpO2: 96% (2018 05:47) (96% - 96%)      I&O's Summary    2018 07:  -  2018 07:00  --------------------------------------------------------  IN: 150 mL / OUT: 1452 mL / NET: -1302 mL    2018 07:01  -  10 Feb 2018 04:41  --------------------------------------------------------  IN: 150 mL / OUT: 451 mL / NET: -301 mL     I&O's Detail    2018 07:  -  2018 07:00  --------------------------------------------------------  IN:    IV PiggyBack: 150 mL  Total IN: 150 mL    OUT:    Stool: 2 mL    Voided: 1450 mL  Total OUT: 1452 mL    Total NET: -1302 mL      2018 07:01  -  10 2018 04:41  --------------------------------------------------------  IN:    IV PiggyBack: 150 mL  Total IN: 150 mL    OUT:    Stool: 1 mL    Voided: 450 mL  Total OUT: 451 mL    Total NET: -301 mL          PHYSICAL EXAM:    GENERAL: NAD    HEENT: NCAT    CHEST/LUNGS: CTAB    HEART: RRR,  No murmurs, rubs, or gallops    ABDOMEN: SNTND +BS    EXTREMITIES:  FROM, No clubbing, cyanosis, or edema, palpable pulse    NEURO: No focal neurological deficits    SKIN: No rashes or lesions    INCISION/WOUNDS:    Diet, Renal Restrictions:   For patients receiving Renal Replacement - No Protein Restr, No Conc K, No Conc Phos, Low Sodium (18 @ 17:11)    MEDICATIONS  (STANDING):  amLODIPine   Tablet 10 milliGRAM(s) Oral daily  aspirin  chewable 81 milliGRAM(s) Oral daily  atorvastatin 80 milliGRAM(s) Oral at bedtime  calcium acetate 667 milliGRAM(s) Oral three times a day with meals  ceFAZolin   IVPB      ceFAZolin   IVPB 2000 milliGRAM(s) IV Intermittent every 8 hours  docusate sodium 100 milliGRAM(s) Oral three times a day  hydrALAZINE 25 milliGRAM(s) Oral every 6 hours  influenza   Vaccine 0.5 milliLiter(s) IntraMuscular once  lisinopril 10 milliGRAM(s) Oral daily  magnesium oxide 400 milliGRAM(s) Oral every 12 hours  metoprolol     tartrate 25 milliGRAM(s) Oral every 8 hours    MEDICATIONS  (PRN):  acetaminophen   Tablet. 650 milliGRAM(s) Oral every 4 hours PRN Mild Pain (1 - 3)  ondansetron Injectable 4 milliGRAM(s) IV Push every 8 hours PRN Nausea and/or Vomiting  oxyCODONE    IR 5 milliGRAM(s) Oral every 4 hours PRN Moderate Pain (4 - 6)                              9.7    8.03  )-----------( 410      ( 2018 13:06 )             28.5        CBC Full  -  ( 2018 13:06 )  WBC Count : 8.03 K/uL  Hemoglobin : 9.7 g/dL  Hematocrit : 28.5 %  Platelet Count - Automated : 410 K/uL  Mean Cell Volume : 83.6 fL  Mean Cell Hemoglobin : 28.4 pg  Mean Cell Hemoglobin Concentration : 34.0 g/dL  Auto Neutrophil # : x  Auto Lymphocyte # : x  Auto Monocyte # : x  Auto Eosinophil # : x  Auto Basophil # : x  Auto Neutrophil % : x  Auto Lymphocyte % : x  Auto Monocyte % : x  Auto Eosinophil % : x  Auto Basophil % : x      DVT PROPHYLAXIS  GI PROPHYLAXIS                143   |  112   |  25                 Ca: 8.4    BMP:   ----------------------------< 87     M.9   (18 @ 21:47)             3.1    |  23    | 3.7                Ph: x        IMAGING: < from: Xray Chest 1 View-PORTABLE IMMEDIATE (18 @ 08:57) >    No radiographic evidence of acute cardiopulmonary disease.    Unchanged.    < end of copied text >      PATHOLOGY:      SPECTRA:  SPECTRA: [id number] VASCULAR SURGERY PROGRESS NOTE    Procedure: S/P EXCISION OF INFECTED PTFE OBTURATOR GRAFT, AND REDO BYPASS WITH REVERSE SAPH VEIN , S/P TDC REMOVAL , NEW  FOCAL DISSECTION OF MID RT EXT ILIAC ART    Events of past 24 hours:  patients bp medications were adjusted to get her BP under control k and mg low so repleated and labs repeated    PAST MEDICAL & SURGICAL HISTORY:  S/P LT ILIOFEM BYPASS W PTFE, & LIGATION OF LT CFA 2017, S/P LEFT CFA PSA REPAIR WITH PATCH 2017, LEFT FEM PSA W/ MULTIPLE DEBRIDEMENS, HTN. ESRD ON HD, CAD      Vital Signs Last 24 Hrs  T(C): 36.8 (2018 23:38), Max: 36.8 (2018 23:38)  T(F): 98.2 (2018 23:38), Max: 98.2 (2018 23:38)  HR: 72 (2018 23:38) (72 - 90)  BP: 177/92 (2018 23:38) (140/90 - 194/101)  BP(mean): --  RR: 18 (2018 23:38) (18 - 18)  SpO2: 96% (2018 05:47) (96% - 96%)      I&O's Summary    2018 07:  -  2018 07:00  --------------------------------------------------------  IN: 150 mL / OUT: 1452 mL / NET: -1302 mL    2018 07:01  -  10 Feb 2018 04:41  --------------------------------------------------------  IN: 150 mL / OUT: 451 mL / NET: -301 mL     I&O's Detail    2018 07:  -  2018 07:00  --------------------------------------------------------  IN:    IV PiggyBack: 150 mL  Total IN: 150 mL    OUT:    Stool: 2 mL    Voided: 1450 mL  Total OUT: 1452 mL    Total NET: -1302 mL      2018 07:01  -  10 2018 04:41  --------------------------------------------------------  IN:    IV PiggyBack: 150 mL  Total IN: 150 mL    OUT:    Stool: 1 mL    Voided: 450 mL  Total OUT: 451 mL    Total NET: -301 mL          PHYSICAL EXAM:    GENERAL: NAD    HEENT: NCAT    CHEST/LUNGS: CTAB    HEART: RRR,  No murmurs, rubs, or gallops    ABDOMEN: SNTND +BS    EXTREMITIES:  dressing saturated, wound appears to be healing well when exposed    NEURO: No focal neurological deficits    SKIN: No rashes or lesions    INCISION/WOUNDS: ruby, dressing mild saturated    Diet, Renal Restrictions:   For patients receiving Renal Replacement - No Protein Restr, No Conc K, No Conc Phos, Low Sodium (18 @ 17:11)    MEDICATIONS  (STANDING):  amLODIPine   Tablet 10 milliGRAM(s) Oral daily  aspirin  chewable 81 milliGRAM(s) Oral daily  atorvastatin 80 milliGRAM(s) Oral at bedtime  calcium acetate 667 milliGRAM(s) Oral three times a day with meals  ceFAZolin   IVPB      ceFAZolin   IVPB 2000 milliGRAM(s) IV Intermittent every 8 hours  docusate sodium 100 milliGRAM(s) Oral three times a day  hydrALAZINE 25 milliGRAM(s) Oral every 6 hours  influenza   Vaccine 0.5 milliLiter(s) IntraMuscular once  lisinopril 10 milliGRAM(s) Oral daily  magnesium oxide 400 milliGRAM(s) Oral every 12 hours  metoprolol     tartrate 25 milliGRAM(s) Oral every 8 hours    MEDICATIONS  (PRN):  acetaminophen   Tablet. 650 milliGRAM(s) Oral every 4 hours PRN Mild Pain (1 - 3)  ondansetron Injectable 4 milliGRAM(s) IV Push every 8 hours PRN Nausea and/or Vomiting  oxyCODONE    IR 5 milliGRAM(s) Oral every 4 hours PRN Moderate Pain (4 - 6)                              9.7    8.03  )-----------( 410      ( 2018 13:06 )             28.5        CBC Full  -  ( 2018 13:06 )  WBC Count : 8.03 K/uL  Hemoglobin : 9.7 g/dL  Hematocrit : 28.5 %  Platelet Count - Automated : 410 K/uL  Mean Cell Volume : 83.6 fL  Mean Cell Hemoglobin : 28.4 pg  Mean Cell Hemoglobin Concentration : 34.0 g/dL  Auto Neutrophil # : x  Auto Lymphocyte # : x  Auto Monocyte # : x  Auto Eosinophil # : x  Auto Basophil # : x  Auto Neutrophil % : x  Auto Lymphocyte % : x  Auto Monocyte % : x  Auto Eosinophil % : x  Auto Basophil % : x      DVT PROPHYLAXIS  GI PROPHYLAXIS                143   |  112   |  25                 Ca: 8.4    BMP:   ----------------------------< 87     M.9   (18 @ 21:47)             3.1    |  23    | 3.7                Ph: x        IMAGING: < from: Xray Chest 1 View-PORTABLE IMMEDIATE (18 @ 08:57) >    No radiographic evidence of acute cardiopulmonary disease.    Unchanged.    < end of copied text >      PATHOLOGY:      SPECTRA:  SPECTRA: [id number]

## 2018-02-11 LAB
ANION GAP SERPL CALC-SCNC: 10 MMOL/L — SIGNIFICANT CHANGE UP (ref 7–14)
BUN SERPL-MCNC: 25 MG/DL — HIGH (ref 10–20)
CALCIUM SERPL-MCNC: 8.3 MG/DL — LOW (ref 8.5–10.1)
CHLORIDE SERPL-SCNC: 108 MMOL/L — SIGNIFICANT CHANGE UP (ref 98–110)
CO2 SERPL-SCNC: 22 MMOL/L — SIGNIFICANT CHANGE UP (ref 17–32)
CREAT SERPL-MCNC: 3.4 MG/DL — HIGH (ref 0.7–1.5)
GLUCOSE SERPL-MCNC: 69 MG/DL — LOW (ref 70–110)
HCT VFR BLD CALC: 31.7 % — LOW (ref 37–47)
HGB BLD-MCNC: 10.5 G/DL — LOW (ref 14–18)
MAGNESIUM SERPL-MCNC: 2 MG/DL — SIGNIFICANT CHANGE UP (ref 1.8–2.4)
MCHC RBC-ENTMCNC: 28.2 PG — SIGNIFICANT CHANGE UP (ref 27–31)
MCHC RBC-ENTMCNC: 33.1 G/DL — SIGNIFICANT CHANGE UP (ref 32–37)
MCV RBC AUTO: 85.2 FL — SIGNIFICANT CHANGE UP (ref 81–91)
NRBC # BLD: 0 /100 WBCS — SIGNIFICANT CHANGE UP (ref 0–0)
PHOSPHATE SERPL-MCNC: 3.5 MG/DL — SIGNIFICANT CHANGE UP (ref 2.1–4.9)
PLATELET # BLD AUTO: 440 K/UL — HIGH (ref 130–400)
POTASSIUM SERPL-MCNC: 3.7 MMOL/L — SIGNIFICANT CHANGE UP (ref 3.5–5)
POTASSIUM SERPL-SCNC: 3.7 MMOL/L — SIGNIFICANT CHANGE UP (ref 3.5–5)
RBC # BLD: 3.72 M/UL — LOW (ref 4.2–5.4)
RBC # FLD: 15.7 % — HIGH (ref 11.5–14.5)
SODIUM SERPL-SCNC: 140 MMOL/L — SIGNIFICANT CHANGE UP (ref 135–146)
WBC # BLD: 8.96 K/UL — SIGNIFICANT CHANGE UP (ref 4.8–10.8)
WBC # FLD AUTO: 8.96 K/UL — SIGNIFICANT CHANGE UP (ref 4.8–10.8)

## 2018-02-11 RX ORDER — AMLODIPINE BESYLATE 2.5 MG/1
10 TABLET ORAL EVERY 24 HOURS
Qty: 0 | Refills: 0 | Status: DISCONTINUED | OUTPATIENT
Start: 2018-02-11 | End: 2018-02-20

## 2018-02-11 RX ORDER — POTASSIUM CHLORIDE 20 MEQ
20 PACKET (EA) ORAL ONCE
Qty: 0 | Refills: 0 | Status: COMPLETED | OUTPATIENT
Start: 2018-02-11 | End: 2018-02-11

## 2018-02-11 RX ORDER — LABETALOL HCL 100 MG
200 TABLET ORAL EVERY 12 HOURS
Qty: 0 | Refills: 0 | Status: DISCONTINUED | OUTPATIENT
Start: 2018-02-11 | End: 2018-02-20

## 2018-02-11 RX ADMIN — Medication 50 MILLIGRAM(S): at 05:27

## 2018-02-11 RX ADMIN — Medication 200 MILLIGRAM(S): at 12:00

## 2018-02-11 RX ADMIN — Medication 50 MILLIGRAM(S): at 00:09

## 2018-02-11 RX ADMIN — Medication 200 MILLIGRAM(S): at 18:30

## 2018-02-11 RX ADMIN — MAGNESIUM OXIDE 400 MG ORAL TABLET 400 MILLIGRAM(S): 241.3 TABLET ORAL at 05:29

## 2018-02-11 RX ADMIN — ATORVASTATIN CALCIUM 80 MILLIGRAM(S): 80 TABLET, FILM COATED ORAL at 22:02

## 2018-02-11 RX ADMIN — ONDANSETRON 4 MILLIGRAM(S): 8 TABLET, FILM COATED ORAL at 23:04

## 2018-02-11 RX ADMIN — Medication 50 MILLIGRAM(S): at 18:28

## 2018-02-11 RX ADMIN — Medication 100 MILLIGRAM(S): at 22:01

## 2018-02-11 RX ADMIN — MAGNESIUM OXIDE 400 MG ORAL TABLET 400 MILLIGRAM(S): 241.3 TABLET ORAL at 22:01

## 2018-02-11 RX ADMIN — Medication 100 MILLIEQUIVALENT(S): at 03:43

## 2018-02-11 RX ADMIN — Medication 100 MILLIGRAM(S): at 05:26

## 2018-02-11 RX ADMIN — Medication 50 MILLIGRAM(S): at 11:51

## 2018-02-11 RX ADMIN — Medication 100 MILLIGRAM(S): at 13:54

## 2018-02-11 RX ADMIN — Medication 100 MILLIEQUIVALENT(S): at 03:45

## 2018-02-11 RX ADMIN — Medication 81 MILLIGRAM(S): at 11:51

## 2018-02-11 RX ADMIN — AMLODIPINE BESYLATE 10 MILLIGRAM(S): 2.5 TABLET ORAL at 10:20

## 2018-02-11 RX ADMIN — Medication 667 MILLIGRAM(S): at 08:38

## 2018-02-11 RX ADMIN — Medication 650 MILLIGRAM(S): at 18:29

## 2018-02-11 RX ADMIN — LISINOPRIL 20 MILLIGRAM(S): 2.5 TABLET ORAL at 05:27

## 2018-02-11 RX ADMIN — Medication 50 MILLIGRAM(S): at 23:06

## 2018-02-11 RX ADMIN — Medication 75 MILLIGRAM(S): at 05:28

## 2018-02-11 RX ADMIN — ONDANSETRON 4 MILLIGRAM(S): 8 TABLET, FILM COATED ORAL at 00:09

## 2018-02-11 RX ADMIN — AMLODIPINE BESYLATE 10 MILLIGRAM(S): 2.5 TABLET ORAL at 05:27

## 2018-02-11 RX ADMIN — Medication 667 MILLIGRAM(S): at 11:51

## 2018-02-11 RX ADMIN — Medication 667 MILLIGRAM(S): at 18:27

## 2018-02-11 NOTE — PROVIDER CONTACT NOTE (OTHER) - SITUATION
MD aware pt. mag  orders pending prior to shift. as per MD do not give meds as pt. received other magnesium orders prior to shift. no further interventions.
/102 HR 60. Pt asyptomatic

## 2018-02-11 NOTE — PROGRESS NOTE ADULT - SUBJECTIVE AND OBJECTIVE BOX
Nephrology progress note    Patient is seen and examined, events over the last 24 h noted .  No complaints  No events    Allergies:  ciprofloxacin (Rash)  Keflex (Unknown)    Hospital Medications:   MEDICATIONS  (STANDING):  amLODIPine   Tablet 10 milliGRAM(s) Oral daily  aspirin  chewable 81 milliGRAM(s) Oral daily  atorvastatin 80 milliGRAM(s) Oral at bedtime  calcium acetate 667 milliGRAM(s) Oral three times a day with meals  ceFAZolin   IVPB      ceFAZolin   IVPB 2000 milliGRAM(s) IV Intermittent every 8 hours  docusate sodium 100 milliGRAM(s) Oral three times a day  hydrALAZINE 50 milliGRAM(s) Oral every 6 hours  influenza   Vaccine 0.5 milliLiter(s) IntraMuscular once  lisinopril 20 milliGRAM(s) Oral daily  magnesium oxide 400 milliGRAM(s) Oral every 12 hours  metoprolol     tartrate 50 milliGRAM(s) Oral every 8 hours  metoprolol     tartrate 75 milliGRAM(s) Oral every 8 hours        VITALS:  T(F): 96.9 (02-11-18 @ 00:00), Max: 97.9 (02-10-18 @ 15:35)  HR: 63 (02-11-18 @ 02:54)  BP: 175/91 (02-11-18 @ 02:54)  RR: 18 (02-11-18 @ 02:54)  SpO2: --  Wt(kg): --    02-08 @ 07:01  -  02-09 @ 07:00  --------------------------------------------------------  IN: 150 mL / OUT: 1452 mL / NET: -1302 mL    02-09 @ 07:01  -  02-10 @ 07:00  --------------------------------------------------------  IN: 150 mL / OUT: 452 mL / NET: -302 mL    02-10 @ 07:01  -  02-11 @ 05:25  --------------------------------------------------------  IN: 260 mL / OUT: 1927 mL / NET: -1667 mL          PHYSICAL EXAM:  Constitutional: NAD  HEENT: anicteric sclera, oropharynx clear, MMM  Neck: No JVD  Respiratory: CTAB, no wheezes, rales or rhonchi  Cardiovascular: S1, S2, RRR  Gastrointestinal: BS+, soft, NT/ND  Extremities: No cyanosis or clubbing. No peripheral edema  Neurological: A/O x 3, no focal deficits  : No CVA tenderness. No tineo.   Skin: No rashes  Vascular Access:    LABS:  02-10    138  |  104  |  x   ----------------------------<  x   2.7<LL>   |  25  |  x     Ca    8.4<L>      09 Feb 2018 21:47  Mg     1.8     02-10                            9.7    8.03  )-----------( 410      ( 09 Feb 2018 13:06 )             28.5     Creatinine, Serum: 3.7 mg/dL (02-09-18 @ 21:47)  Creatinine, Serum: 3.7 mg/dL (02-09-18 @ 13:06)  Creatinine, Serum: 4.0 mg/dL (02-08-18 @ 00:54)  Creatinine, Serum: 3.9 mg/dL (02-07-18 @ 14:33)  Creatinine, Serum: 4.4 mg/dL (02-06-18 @ 12:02)          Urine Studies:      RADIOLOGY & ADDITIONAL STUDIES: Nephrology progress note    Patient is seen and examined, events over the last 24 h noted .  No complaints  No events    Allergies:  ciprofloxacin (Rash)  Keflex (Unknown)    Hospital Medications:   MEDICATIONS  (STANDING):  amLODIPine   Tablet 10 milliGRAM(s) Oral daily  aspirin  chewable 81 milliGRAM(s) Oral daily  atorvastatin 80 milliGRAM(s) Oral at bedtime  calcium acetate 667 milliGRAM(s) Oral three times a day with meals  ceFAZolin   IVPB      ceFAZolin   IVPB 2000 milliGRAM(s) IV Intermittent every 8 hours  docusate sodium 100 milliGRAM(s) Oral three times a day  hydrALAZINE 50 milliGRAM(s) Oral every 6 hours  influenza   Vaccine 0.5 milliLiter(s) IntraMuscular once  lisinopril 20 milliGRAM(s) Oral daily  magnesium oxide 400 milliGRAM(s) Oral every 12 hours  metoprolol     tartrate 50 milliGRAM(s) Oral every 8 hours  metoprolol     tartrate 75 milliGRAM(s) Oral every 8 hours        VITALS:  T(F): 96.9 (02-11-18 @ 00:00), Max: 97.9 (02-10-18 @ 15:35)  HR: 63 (02-11-18 @ 02:54)  BP: 175/91 (02-11-18 @ 02:54)  RR: 18 (02-11-18 @ 02:54)  SpO2: --  Wt(kg): --    02-08 @ 07:01  -  02-09 @ 07:00  --------------------------------------------------------  IN: 150 mL / OUT: 1452 mL / NET: -1302 mL    02-09 @ 07:01  -  02-10 @ 07:00  --------------------------------------------------------  IN: 150 mL / OUT: 452 mL / NET: -302 mL    02-10 @ 07:01  -  02-11 @ 05:25  --------------------------------------------------------  IN: 260 mL / OUT: 1927 mL / NET: -1667 mL          PHYSICAL EXAM:  Constitutional: NAD  HEENT: anicteric sclera, oropharynx clear, MMM  Neck: No JVD  Respiratory: CTAB, no wheezes, rales or rhonchi  Cardiovascular: S1, S2, RRR  Gastrointestinal: BS+, soft, NT/ND  Extremities: No cyanosis or clubbing. No peripheral edema  Neurological: A/O x 3, no focal deficits  : No CVA tenderness. No tineo.   Skin: No rashes  Vascular Access:    LABS:  02-10    138  |  104  |  x   ----------------------------<  x   2.7<LL>   |  25  |  x       Ca    8.4<L>      09 Feb 2018 21:47  Mg     1.8     02-10                          9.7    8.03  )-----------( 410      ( 09 Feb 2018 13:06 )             28.5     Creatinine, Serum: 3.7 mg/dL (02-09-18 @ 21:47)  Creatinine, Serum: 3.7 mg/dL (02-09-18 @ 13:06)  Creatinine, Serum: 4.0 mg/dL (02-08-18 @ 00:54)  Creatinine, Serum: 3.9 mg/dL (02-07-18 @ 14:33)  Creatinine, Serum: 4.4 mg/dL (02-06-18 @ 12:02)          Urine Studies:      RADIOLOGY & ADDITIONAL STUDIES: Nephrology progress note  Chief Complaint: DIONICIO sp PIGN   Patient is seen and examined, events over the last 24 h noted .  No complaints  No events    Allergies:  ciprofloxacin (Rash)  Keflex (Unknown)    Hospital Medications:   MEDICATIONS  (STANDING):  amLODIPine   Tablet 10 milliGRAM(s) Oral daily  aspirin  chewable 81 milliGRAM(s) Oral daily  atorvastatin 80 milliGRAM(s) Oral at bedtime  calcium acetate 667 milliGRAM(s) Oral three times a day with meals  ceFAZolin   IVPB      ceFAZolin   IVPB 2000 milliGRAM(s) IV Intermittent every 8 hours  docusate sodium 100 milliGRAM(s) Oral three times a day  hydrALAZINE 50 milliGRAM(s) Oral every 6 hours  influenza   Vaccine 0.5 milliLiter(s) IntraMuscular once  lisinopril 20 milliGRAM(s) Oral daily  magnesium oxide 400 milliGRAM(s) Oral every 12 hours  metoprolol     tartrate 50 milliGRAM(s) Oral every 8 hours  metoprolol     tartrate 75 milliGRAM(s) Oral every 8 hours        VITALS:  T(F): 96.9 (02-11-18 @ 00:00), Max: 97.9 (02-10-18 @ 15:35)  HR: 63 (02-11-18 @ 02:54)  BP: 175/91 (02-11-18 @ 02:54)  RR: 18 (02-11-18 @ 02:54)  SpO2: --  Wt(kg): --    02-08 @ 07:01  -  02-09 @ 07:00  --------------------------------------------------------  IN: 150 mL / OUT: 1452 mL / NET: -1302 mL    02-09 @ 07:01  -  02-10 @ 07:00  --------------------------------------------------------  IN: 150 mL / OUT: 452 mL / NET: -302 mL    02-10 @ 07:01  -  02-11 @ 05:25  --------------------------------------------------------  IN: 260 mL / OUT: 1927 mL / NET: -1667 mL          PHYSICAL EXAM:  Constitutional: NAD  HEENT: anicteric sclera, oropharynx clear, MMM  Neck: No JVD  Respiratory: CTAB, no wheezes, rales or rhonchi  Cardiovascular: S1, S2, RRR  Gastrointestinal: BS+, soft, NT/ND  Extremities: No cyanosis or clubbing. No peripheral edema  Neurological: A/O x 3, no focal deficits  : No CVA tenderness. No tineo.   Skin: No rashes  Vascular Access:    LABS:  02-10    138  |  104  |  x   ----------------------------<  x   2.7<LL>   |  25  |  x       Ca    8.4<L>      09 Feb 2018 21:47  Mg     1.8     02-10                          9.7    8.03  )-----------( 410      ( 09 Feb 2018 13:06 )             28.5     Creatinine, Serum: 3.7 mg/dL (02-09-18 @ 21:47)  Creatinine, Serum: 3.7 mg/dL (02-09-18 @ 13:06)  Creatinine, Serum: 4.0 mg/dL (02-08-18 @ 00:54)  Creatinine, Serum: 3.9 mg/dL (02-07-18 @ 14:33)  Creatinine, Serum: 4.4 mg/dL (02-06-18 @ 12:02)          Urine Studies:      RADIOLOGY & ADDITIONAL STUDIES:

## 2018-02-11 NOTE — PROGRESS NOTE ADULT - ASSESSMENT
DIONICIO/PIGN/ATN/bacteremia/uncontrolled HTN/hypokalemia  # creatinine better off HD  # replete K, check repeat stat and add Mg oxide 500 mg po q12h  # on lisinopril  10 and add norvasc 10 once  a day  # continue phoslo  # remains on ancef  # will follow DIONICIO/PIGN/ATN/bacteremia/uncontrolled HTN/hypokalemia  # creatinine better off HD  # replete K, check repeat stat and add Mg oxide 500 mg po q12h  # on lisinopril  10,  add norvasc 10 once  a day, hydrazine 50 mg q r hrs  # continue phoslo  # remains on ancef  # will follow DIONICIO/PIGN/ATN/bacteremia/uncontrolled HTN/hypokalemia  # creatinine better off HD  # replete K, check repeat stat and add Mg oxide 500 mg po q12h  # on lisinopril  10,  add norvasc 10 once  a day, hydrazine 50 mg q r hrs  # check PRA, PAC, PAC/PRA RATIO, RENAL ARTERY DOPLUX.  # continue phoslo  # remains on ancef  # will follow

## 2018-02-11 NOTE — PROGRESS NOTE ADULT - SUBJECTIVE AND OBJECTIVE BOX
SUBJECTIVE:    Ambulated without CP / dyspnea.    Remains off dialysis with improving Cr.    BP has been elevated.    MEDICATIONS  (STANDING):  amLODIPine   Tablet 10 milliGRAM(s) Oral every 24 hours  aspirin  chewable 81 milliGRAM(s) Oral daily  atorvastatin 80 milliGRAM(s) Oral at bedtime  calcium acetate 667 milliGRAM(s) Oral three times a day with meals  ceFAZolin   IVPB      ceFAZolin   IVPB 2000 milliGRAM(s) IV Intermittent every 8 hours  docusate sodium 100 milliGRAM(s) Oral three times a day  hydrALAZINE 50 milliGRAM(s) Oral every 6 hours  influenza   Vaccine 0.5 milliLiter(s) IntraMuscular once  labetalol 200 milliGRAM(s) Oral every 12 hours  lisinopril 20 milliGRAM(s) Oral daily  magnesium oxide 400 milliGRAM(s) Oral every 12 hours    Vital Signs Last 24 Hrs:  T(C): 36.2 (11 Feb 2018 07:45), Max: 36.6 (10 Feb 2018 15:35)  T(F): 97.2 (11 Feb 2018 07:45), Max: 97.9 (10 Feb 2018 15:35)  HR: 68 (11 Feb 2018 07:45) (58 - 68)  BP: 187/88 (11 Feb 2018 07:45) (170/89 - 217/114)  BP(mean): --  RR: 18 (11 Feb 2018 07:45) (18 - 19)  SpO2: --    PHYSICAL EXAM:  Alert, no distress  Reg, nL s1/s2, no m/r/g  CTA  Warm, no edema  	  LABS:                      10.5   8.96  )-----------( 440      ( 11 Feb 2018 08:30 )             31.7     02-10    138  |  104  |  x   ----------------------------<  x   2.7<LL>   |  25  |  x     Ca    8.4<L>      09 Feb 2018 21:47  Mg     1.8     02-10    IMPRESSION:    1) CAD s/p PCI RCA (stable)    2) 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  Risk of DAPT seems > benefit at present.    2) Cont Lipitor.    3) Start Toprol 50 mg daily.  Increase as needed and discontinue Labetalol as tolerated.    4) Defer increasing Lisinopril to renal given DIONICIO.

## 2018-02-12 RX ORDER — POTASSIUM CHLORIDE 20 MEQ
20 PACKET (EA) ORAL ONCE
Qty: 0 | Refills: 0 | Status: COMPLETED | OUTPATIENT
Start: 2018-02-12 | End: 2018-02-12

## 2018-02-12 RX ADMIN — Medication 100 MILLIGRAM(S): at 13:09

## 2018-02-12 RX ADMIN — Medication 20 MILLIEQUIVALENT(S): at 14:51

## 2018-02-12 RX ADMIN — Medication 50 MILLIGRAM(S): at 12:49

## 2018-02-12 RX ADMIN — Medication 50 MILLIGRAM(S): at 05:41

## 2018-02-12 RX ADMIN — Medication 667 MILLIGRAM(S): at 12:49

## 2018-02-12 RX ADMIN — Medication 650 MILLIGRAM(S): at 16:10

## 2018-02-12 RX ADMIN — Medication 100 MILLIGRAM(S): at 21:39

## 2018-02-12 RX ADMIN — LISINOPRIL 20 MILLIGRAM(S): 2.5 TABLET ORAL at 05:41

## 2018-02-12 RX ADMIN — MAGNESIUM OXIDE 400 MG ORAL TABLET 400 MILLIGRAM(S): 241.3 TABLET ORAL at 05:41

## 2018-02-12 RX ADMIN — Medication 200 MILLIGRAM(S): at 18:13

## 2018-02-12 RX ADMIN — Medication 650 MILLIGRAM(S): at 20:50

## 2018-02-12 RX ADMIN — Medication 667 MILLIGRAM(S): at 18:12

## 2018-02-12 RX ADMIN — ATORVASTATIN CALCIUM 80 MILLIGRAM(S): 80 TABLET, FILM COATED ORAL at 21:39

## 2018-02-12 RX ADMIN — AMLODIPINE BESYLATE 10 MILLIGRAM(S): 2.5 TABLET ORAL at 08:45

## 2018-02-12 RX ADMIN — Medication 100 MILLIGRAM(S): at 05:42

## 2018-02-12 RX ADMIN — Medication 667 MILLIGRAM(S): at 08:45

## 2018-02-12 RX ADMIN — Medication 200 MILLIGRAM(S): at 05:42

## 2018-02-12 RX ADMIN — Medication 50 MILLIGRAM(S): at 18:12

## 2018-02-12 RX ADMIN — Medication 81 MILLIGRAM(S): at 12:48

## 2018-02-12 NOTE — PROGRESS NOTE ADULT - SUBJECTIVE AND OBJECTIVE BOX
[facility] FOLLOW UP NOTE  --------------------------------------------------------------------------------  Chief Complaint:     no new complaints       Standing Inpatient Medications  amLODIPine   Tablet 10 milliGRAM(s) Oral every 24 hours  aspirin  chewable 81 milliGRAM(s) Oral daily  atorvastatin 80 milliGRAM(s) Oral at bedtime  calcium acetate 667 milliGRAM(s) Oral three times a day with meals  ceFAZolin   IVPB      ceFAZolin   IVPB 2000 milliGRAM(s) IV Intermittent every 8 hours  docusate sodium 100 milliGRAM(s) Oral three times a day  hydrALAZINE 50 milliGRAM(s) Oral every 6 hours  influenza   Vaccine 0.5 milliLiter(s) IntraMuscular once  labetalol 200 milliGRAM(s) Oral every 12 hours  lisinopril 20 milliGRAM(s) Oral daily  magnesium oxide 400 milliGRAM(s) Oral every 12 hours  potassium chloride    Tablet ER 20 milliEquivalent(s) Oral once    PRN Inpatient Medications  acetaminophen   Tablet. 650 milliGRAM(s) Oral every 4 hours PRN  ondansetron Injectable 4 milliGRAM(s) IV Push every 8 hours PRN        VITALS/PHYSICAL EXAM  --------------------------------------------------------------------------------  T(C): 36.1 (02-12-18 @ 07:34), Max: 36.7 (02-11-18 @ 23:30)  HR: 75 (02-12-18 @ 07:34) (66 - 75)  BP: 149/76 (02-12-18 @ 07:34) (112/59 - 149/76)  RR: 18 (02-12-18 @ 07:34) (18 - 19)  SpO2: --  Wt(kg): --        02-11-18 @ 07:01  -  02-12-18 @ 07:00  --------------------------------------------------------  IN: 440 mL / OUT: 1450 mL / NET: -1010 mL      Physical Exam:  	Gen: NAD, well-appearing  oropharynx  	Pulm: CTA B/L  	CV: RRR, S1S2; no rub  	Abd: +BS, soft, nontender/nondistended  	LE:  edema left leg, staples   	  	    LABS/STUDIES  --------------------------------------------------------------------------------              10.5   8.96  >-----------<  440      [02-11-18 @ 08:30]              31.7     140  |  108  |  25  ----------------------------<  69      [02-11-18 @ 08:30]  3.7   |  22  |  3.4        Ca     8.3     [02-11-18 @ 08:30]      Mg     2.0     [02-11-18 @ 08:30]      Phos  3.5     [02-11-18 @ 08:30]            Creatinine Trend:  SCr 3.4 [02-11 @ 08:30]  SCr 3.7 [02-09 @ 21:47]  SCr 3.7 [02-09 @ 13:06]  SCr 4.0 [02-08 @ 00:54]  SCr 3.9 [02-07 @ 14:33] [facility] FOLLOW UP NOTE  --------------------------------------------------------------------------------  Chief Complaint:     no new complaints       Standing Inpatient Medications  amLODIPine   Tablet 10 milliGRAM(s) Oral every 24 hours  aspirin  chewable 81 milliGRAM(s) Oral daily  atorvastatin 80 milliGRAM(s) Oral at bedtime  calcium acetate 667 milliGRAM(s) Oral three times a day with meals  ceFAZolin   IVPB      ceFAZolin   IVPB 2000 milliGRAM(s) IV Intermittent every 8 hours  docusate sodium 100 milliGRAM(s) Oral three times a day  hydrALAZINE 50 milliGRAM(s) Oral every 6 hours  influenza   Vaccine 0.5 milliLiter(s) IntraMuscular once  labetalol 200 milliGRAM(s) Oral every 12 hours  lisinopril 20 milliGRAM(s) Oral daily  magnesium oxide 400 milliGRAM(s) Oral every 12 hours  potassium chloride    Tablet ER 20 milliEquivalent(s) Oral once    PRN Inpatient Medications  acetaminophen   Tablet. 650 milliGRAM(s) Oral every 4 hours PRN  ondansetron Injectable 4 milliGRAM(s) IV Push every 8 hours PRN        VITALS/PHYSICAL EXAM  --------------------------------------------------------------------------------  T(C): 36.1 (02-12-18 @ 07:34), Max: 36.7 (02-11-18 @ 23:30)  HR: 75 (02-12-18 @ 07:34) (66 - 75)  BP: 149/76 (02-12-18 @ 07:34) (112/59 - 149/76)  RR: 18 (02-12-18 @ 07:34) (18 - 19)    Wt(kg): --        02-11-18 @ 07:01  -  02-12-18 @ 07:00  --------------------------------------------------------  IN: 440 mL / OUT: 1450 mL / NET: -1010 mL      Physical Exam:  	Gen: NAD, well-appearing  oropharynx  	Pulm: CTA B/L  	CV: RRR, S1S2; no rub  	Abd: +BS, soft, nontender/nondistended  	LE:  edema left leg, staples   	  	    LABS/STUDIES  --------------------------------------------------------------------------------              10.5   8.96  >-----------<  440      [02-11-18 @ 08:30]              31.7     140  |  108  |  25  ----------------------------<  69      [02-11-18 @ 08:30]  3.7   |  22  |  3.4        Ca     8.3     [02-11-18 @ 08:30]      Mg     2.0     [02-11-18 @ 08:30]      Phos  3.5     [02-11-18 @ 08:30]            Creatinine Trend:  SCr 3.4 [02-11 @ 08:30]  SCr 3.7 [02-09 @ 21:47]  SCr 3.7 [02-09 @ 13:06]  SCr 4.0 [02-08 @ 00:54]  SCr 3.9 [02-07 @ 14:33]

## 2018-02-12 NOTE — PHYSICAL THERAPY INITIAL EVALUATION ADULT - DID THE PATIENT HAVE SURGERY?
yes/S/P EXCISION OF INFECTED PTFE OBTURATOR GRAFT, AND REDO BYPASS WITH REVERSE SAPH VEIN 1/31, S/P TDC REMOVAL 1/23

## 2018-02-12 NOTE — PHYSICAL THERAPY INITIAL EVALUATION ADULT - GENERAL OBSERVATIONS, REHAB EVAL
unable to perform evaluation d/t high BP
PT IE 9:45-10:15am. No c/o pain. Pt requested to do stairs at next PT session.

## 2018-02-12 NOTE — PROGRESS NOTE ADULT - SUBJECTIVE AND OBJECTIVE BOX
PROGRESS NOTE   Patient is a 53y old  Female who presents with a chief complaint of left 1st interspace abscess s/p I&D    HPI:      Vital Signs Last 24 Hrs  T(C): 35.9 (12 Feb 2018 15:35), Max: 36.7 (11 Feb 2018 23:30)  T(F): 96.7 (12 Feb 2018 15:35), Max: 98.1 (11 Feb 2018 23:30)  HR: 79 (12 Feb 2018 15:35) (74 - 79)  BP: 120/74 (12 Feb 2018 15:35) (120/74 - 149/76)  BP(mean): --  RR: 18 (12 Feb 2018 15:35) (18 - 19)  SpO2: --                          10.5   8.96  )-----------( 440      ( 11 Feb 2018 08:30 )             31.7               02-11    140  |  108  |  25<H>  ----------------------------<  69<L>  3.7   |  22  |  3.4<H>    Ca    8.3<L>      11 Feb 2018 08:30  Phos  3.5     02-11  Mg     2.0     02-11        Plan:  Pt to f/u with Dr. Tanner as OP one week after discharge. Please recall podiatry PRN

## 2018-02-12 NOTE — PROGRESS NOTE ADULT - SUBJECTIVE AND OBJECTIVE BOX
PROGRESS NOTE   Patient is a 53y old  Female who presents with a chief complaint of     HPI: Pt is s/p bedside I&D left foot 1st interspace       Vital Signs Last 24 Hrs  T(C): 36.1 (12 Feb 2018 07:34), Max: 36.7 (11 Feb 2018 23:30)  T(F): 97 (12 Feb 2018 07:34), Max: 98.1 (11 Feb 2018 23:30)  HR: 75 (12 Feb 2018 07:34) (66 - 75)  BP: 149/76 (12 Feb 2018 07:34) (112/59 - 149/76)  BP(mean): --  RR: 18 (12 Feb 2018 07:34) (18 - 19)  SpO2: --                          10.5   8.96  )-----------( 440      ( 11 Feb 2018 08:30 )             31.7               02-11    140  |  108  |  25<H>  ----------------------------<  69<L>  3.7   |  22  |  3.4<H>    Ca    8.3<L>      11 Feb 2018 08:30  Phos  3.5     02-11  Mg     2.0     02-11        Assessment:   1. Left foot 1st interspace abscess has resolved w/ no signes of local infection 2 this time  2. dressing changed w/ betadine/DSD    Plan:   1. podiatry will f/u q48-q72 while pt is in house  2. No further intervention from podiatry 2 this point

## 2018-02-12 NOTE — PROGRESS NOTE ADULT - ASSESSMENT
DIONICIO/ATN, PIGN on biopsy/CAD/ bacteremia:  # off HD  # creatinine continues to improve  # cardiology notes appreciated, ok with changing labetalol to toprol   # do not increase lisinopril  # if BP increases after the change in meds , will increase hydralazine to 75 q 8   # check repeat K today

## 2018-02-12 NOTE — PROGRESS NOTE ADULT - SUBJECTIVE AND OBJECTIVE BOX
VASCULAR SURGERY PROGRESS NOTE    Procedure: S/P EXCISION OF INFECTED PTFE OBTURATOR GRAFT, AND REDO BYPASS WITH REVERSE SAPH VEIN 1/31, S/P TDC REMOVAL 1/23, NEW  FOCAL DISSECTION OF MID RT EXT ILIAC ART    Events of past 24 hours:  patients bp medications were adjusted to get her BP under control     PAST MEDICAL & SURGICAL HISTORY:  S/P LT ILIOFEM BYPASS W PTFE, & LIGATION OF LT CFA 12/2017, S/P LEFT CFA PSA REPAIR WITH PATCH 11/2017, LEFT FEM PSA W/ MULTIPLE DEBRIDEMENS, HTN. ESRD ON HD, CAD    Vital Signs Last 24 Hrs  T(C): 36.1 (12 Feb 2018 07:34), Max: 36.7 (11 Feb 2018 23:30)  T(F): 97 (12 Feb 2018 07:34), Max: 98.1 (11 Feb 2018 23:30)  HR: 75 (12 Feb 2018 07:34) (66 - 75)  BP: 149/76 (12 Feb 2018 07:34) (112/59 - 149/76)  BP(mean): --  RR: 18 (12 Feb 2018 07:34) (18 - 19)  SpO2: --    Pain (0-10):            Pain Control Adequate: [] YES [x] N    Diet: nephro diet    I&O's Detail    11 Feb 2018 07:01  -  12 Feb 2018 07:00  --------------------------------------------------------  IN:    Oral Fluid: 440 mL  Total IN: 440 mL    OUT:    Stool: 250 mL    Voided: 1200 mL  Total OUT: 1450 mL    Total NET: -1010 mL          Bowel Movement: : [x] YES [] NO      PHYSICAL EXAM    GENERAL: NAD    HEENT: NCAT    CHEST/LUNGS: CTAB    HEART: RRR,  No murmurs, rubs, or gallops    ABDOMEN: SNTND +BS    EXTREMITIES:  dressing saturated at abdominal incision, wound appears to be healing well when exposed    NEURO: No focal neurological deficits    SKIN: No rashes or lesions    INCISION/WOUNDS: ruby, dressing mild saturated    Diet, Renal Restrictions:   For patients receiving Renal Replacement - No Protein Restr, No Conc K, No Conc Phos, Low Sodium (02-04-18 @ 17:11)    MEDICATIONS:   MEDICATIONS  (STANDING):  amLODIPine   Tablet 10 milliGRAM(s) Oral every 24 hours  aspirin  chewable 81 milliGRAM(s) Oral daily  atorvastatin 80 milliGRAM(s) Oral at bedtime  calcium acetate 667 milliGRAM(s) Oral three times a day with meals  ceFAZolin   IVPB      ceFAZolin   IVPB 2000 milliGRAM(s) IV Intermittent every 8 hours  docusate sodium 100 milliGRAM(s) Oral three times a day  hydrALAZINE 50 milliGRAM(s) Oral every 6 hours  influenza   Vaccine 0.5 milliLiter(s) IntraMuscular once  labetalol 200 milliGRAM(s) Oral every 12 hours  lisinopril 20 milliGRAM(s) Oral daily  magnesium oxide 400 milliGRAM(s) Oral every 12 hours    MEDICATIONS  (PRN):  acetaminophen   Tablet. 650 milliGRAM(s) Oral every 4 hours PRN Mild Pain (1 - 3)  ondansetron Injectable 4 milliGRAM(s) IV Push every 8 hours PRN Nausea and/or Vomiting      DVT PROPHYLAXIS: [] YES [x] NO  GI PROPHYLAXIS: [] YES [x] NO  ANTIPLATELETS: [x] YES [] NO    ASA only  ANTICOAGULATION: [] YES [x] NO  ANTIBIOTICS: [x] YES [] NO    LAB/STUDIES:                        10.5   8.96  )-----------( 440      ( 11 Feb 2018 08:30 )             31.7     02-11    140  |  108  |  25<H>  ----------------------------<  69<L>  3.7   |  22  |  3.4<H>    Ca    8.3<L>      11 Feb 2018 08:30  Phos  3.5     02-11  Mg     2.0     02-11          ASSESSMENT/PLAN:  - HTN: continue adjusting as needed. BP stable on Labetalol 200 mg po q12h, Hydralazine  50 mg po q6hr,   Amlodipine 10 mg po daily, Lisinopril 10 mg po daily  - Hypokalemia - repleated with po K-Dur, hypomagnesemia corrected  - S/P PICC LINE - ANCEF X 6 WEEKS.  - RENAL: no need for RRT  - HOLD BRILLINTA FOR NOW  - pending sw for snf    SPECTRA: [id number]

## 2018-02-12 NOTE — PHYSICAL THERAPY INITIAL EVALUATION ADULT - PERTINENT HX OF CURRENT PROBLEM, REHAB EVAL
S/P LT ILIOFEM BYPASS W PTFE, & LIGATION OF LT CFA 12/2017, S/P LEFT CFA PSA REPAIR WITH PATCH 11/2017, LEFT FEM PSA W/ MULTIPLE DEBRIDEMENS, HTN. ESRD ON HD, CAD

## 2018-02-13 ENCOUNTER — APPOINTMENT (OUTPATIENT)
Dept: INTERNAL MEDICINE | Facility: CLINIC | Age: 53
End: 2018-02-13

## 2018-02-13 LAB
ANION GAP SERPL CALC-SCNC: 11 MMOL/L — SIGNIFICANT CHANGE UP (ref 7–14)
ANION GAP SERPL CALC-SCNC: 11 MMOL/L — SIGNIFICANT CHANGE UP (ref 7–14)
BUN SERPL-MCNC: 27 MG/DL — HIGH (ref 10–20)
BUN SERPL-MCNC: 31 MG/DL — HIGH (ref 10–20)
CALCIUM SERPL-MCNC: 8.1 MG/DL — LOW (ref 8.5–10.1)
CALCIUM SERPL-MCNC: 8.4 MG/DL — LOW (ref 8.5–10.1)
CHLORIDE SERPL-SCNC: 107 MMOL/L — SIGNIFICANT CHANGE UP (ref 98–110)
CHLORIDE SERPL-SCNC: 111 MMOL/L — HIGH (ref 98–110)
CO2 SERPL-SCNC: 20 MMOL/L — SIGNIFICANT CHANGE UP (ref 17–32)
CO2 SERPL-SCNC: 21 MMOL/L — SIGNIFICANT CHANGE UP (ref 17–32)
CREAT SERPL-MCNC: 3.2 MG/DL — HIGH (ref 0.7–1.5)
CREAT SERPL-MCNC: 4.2 MG/DL — CRITICAL HIGH (ref 0.7–1.5)
GLUCOSE SERPL-MCNC: 74 MG/DL — SIGNIFICANT CHANGE UP (ref 70–110)
GLUCOSE SERPL-MCNC: 80 MG/DL — SIGNIFICANT CHANGE UP (ref 70–110)
HCT VFR BLD CALC: 29 % — LOW (ref 37–47)
HGB BLD-MCNC: 9.5 G/DL — LOW (ref 14–18)
MAGNESIUM SERPL-MCNC: 1.9 MG/DL — SIGNIFICANT CHANGE UP (ref 1.8–2.4)
MCHC RBC-ENTMCNC: 28.2 PG — SIGNIFICANT CHANGE UP (ref 27–31)
MCHC RBC-ENTMCNC: 32.8 G/DL — SIGNIFICANT CHANGE UP (ref 32–37)
MCV RBC AUTO: 86.1 FL — SIGNIFICANT CHANGE UP (ref 81–91)
NRBC # BLD: 0 /100 WBCS — SIGNIFICANT CHANGE UP (ref 0–0)
PLATELET # BLD AUTO: 409 K/UL — HIGH (ref 130–400)
POTASSIUM SERPL-MCNC: 3 MMOL/L — LOW (ref 3.5–5)
POTASSIUM SERPL-MCNC: 3.4 MMOL/L — LOW (ref 3.5–5)
POTASSIUM SERPL-SCNC: 3 MMOL/L — LOW (ref 3.5–5)
POTASSIUM SERPL-SCNC: 3.4 MMOL/L — LOW (ref 3.5–5)
RBC # BLD: 3.37 M/UL — LOW (ref 4.2–5.4)
RBC # FLD: 15.9 % — HIGH (ref 11.5–14.5)
SODIUM SERPL-SCNC: 139 MMOL/L — SIGNIFICANT CHANGE UP (ref 135–146)
SODIUM SERPL-SCNC: 142 MMOL/L — SIGNIFICANT CHANGE UP (ref 135–146)
WBC # BLD: 7.08 K/UL — SIGNIFICANT CHANGE UP (ref 4.8–10.8)
WBC # FLD AUTO: 7.08 K/UL — SIGNIFICANT CHANGE UP (ref 4.8–10.8)

## 2018-02-13 RX ORDER — POTASSIUM CHLORIDE 20 MEQ
20 PACKET (EA) ORAL ONCE
Qty: 0 | Refills: 0 | Status: DISCONTINUED | OUTPATIENT
Start: 2018-02-13 | End: 2018-02-13

## 2018-02-13 RX ORDER — POTASSIUM CHLORIDE 20 MEQ
20 PACKET (EA) ORAL ONCE
Qty: 0 | Refills: 0 | Status: COMPLETED | OUTPATIENT
Start: 2018-02-13 | End: 2018-02-13

## 2018-02-13 RX ADMIN — Medication 50 MILLIGRAM(S): at 06:01

## 2018-02-13 RX ADMIN — Medication 50 MILLIGRAM(S): at 17:33

## 2018-02-13 RX ADMIN — LISINOPRIL 20 MILLIGRAM(S): 2.5 TABLET ORAL at 06:01

## 2018-02-13 RX ADMIN — ATORVASTATIN CALCIUM 80 MILLIGRAM(S): 80 TABLET, FILM COATED ORAL at 22:47

## 2018-02-13 RX ADMIN — Medication 200 MILLIGRAM(S): at 17:33

## 2018-02-13 RX ADMIN — MAGNESIUM OXIDE 400 MG ORAL TABLET 400 MILLIGRAM(S): 241.3 TABLET ORAL at 17:33

## 2018-02-13 RX ADMIN — Medication 100 MILLIGRAM(S): at 05:50

## 2018-02-13 RX ADMIN — Medication 81 MILLIGRAM(S): at 08:51

## 2018-02-13 RX ADMIN — Medication 100 MILLIGRAM(S): at 14:18

## 2018-02-13 RX ADMIN — Medication 50 MILLIGRAM(S): at 08:51

## 2018-02-13 RX ADMIN — Medication 100 MILLIGRAM(S): at 22:48

## 2018-02-13 RX ADMIN — Medication 667 MILLIGRAM(S): at 17:33

## 2018-02-13 RX ADMIN — Medication 667 MILLIGRAM(S): at 08:51

## 2018-02-13 RX ADMIN — AMLODIPINE BESYLATE 10 MILLIGRAM(S): 2.5 TABLET ORAL at 08:51

## 2018-02-13 RX ADMIN — Medication 50 MILLIGRAM(S): at 23:42

## 2018-02-13 RX ADMIN — Medication 50 MILLIGRAM(S): at 01:31

## 2018-02-13 RX ADMIN — Medication 200 MILLIGRAM(S): at 06:02

## 2018-02-13 RX ADMIN — MAGNESIUM OXIDE 400 MG ORAL TABLET 400 MILLIGRAM(S): 241.3 TABLET ORAL at 06:01

## 2018-02-13 RX ADMIN — Medication 50 MILLIEQUIVALENT(S): at 06:20

## 2018-02-13 RX ADMIN — Medication 667 MILLIGRAM(S): at 14:18

## 2018-02-13 RX ADMIN — Medication 100 MILLIGRAM(S): at 06:01

## 2018-02-13 NOTE — CHART NOTE - NSCHARTNOTEFT_GEN_A_CORE
Registered Dietitian Follow-Up    [id number]    *** Scroll to the bottom for RD recommendation***         Patient Profile Reviewed                           Yes [x]   No []  Nutrition History Previously Obtained        Yes [x]  No []         *** Pertinent Medical Interventions:  (1) Podiatry following. s/p bedside i+D. No further intervention by podiatry however.  (2) DC planning to SNF to [place]. Awaiting d/c.    ***Pertinent Subjective Information:  (1) Patient reported generally consuming <50% breakfast, 100% lunch, and <50% dinner. Pt requesting whole milk for breakfast.  instead of skim.   (2) Patient reported never receiving the Supplements ordered.      *** Diet order:  Renal Dialysis with Ensure Enlive and Low-protein.     *** Anthropometrics:  - Ht.   - Wt.  No recent weight documented. unable to determine bedscale weight at this time. Last noted weight was 2/3.  - %wt change  - BMI  - IBW     *** Pertinent Lab Data:    2/13: H/H 9.5/29.0, K 3.4, BUN 27, Cr 3.2, Ca 8.4, GFR 16     *** Pertinent Meds:    Aspirin, Abx, k-chloride, amlodipine, labetalol, lisinpril, mag-oxide, calcium docusate, ondansetron, acetaminophen, atorvastatin     *** Physical Findings:  - Appearance:  Alert and oriented, resting comfortably on the side of the bed, with her usual routine. no edema noted.  - GI function: none reported. LBM 2/13 per pt  - Tubes:  - Oral/Mouth cavity: none reported  - Skin:  L foot wound noted on EMR     *** Nutrition Requirements  Weight Used:  Same as previously on 2/3       Estimated Energy Needs    Continue [x]  Adjust []    3668-3390 kcal/day  55-66 g/day (lower for Pre-renal)  1ml/kcal       *** Nutrient Intake:  Remain the same with fluctuating <% each day. Likely the baseline.        [x] Previous Nutrition Diagnosis:  (1) Previous Inadequate oral intake - although improving, this is likely the baseline. Therefore, Pt will be made NOT AT RISK.            [] Ongoing          [x] Resolved          Nutrition Intervention: C/w Oral diet with supplements     Goal/Expected Outcome: Pt to consume and tolerate >75% of all meals and supplements through LOS.      Indicator/Monitoring: RD to monitor diet order, energy intake, body composition (wt trend).    Recs: (1) Change diet to Pre-Renal (low protein, 60g), and Low Sodium. (2) Order Ensure Enlive Vanilla q24hr.

## 2018-02-13 NOTE — PROGRESS NOTE ADULT - SUBJECTIVE AND OBJECTIVE BOX
Vital Signs Last 24 Hrs  T(C): 36.7 (2018 23:52), Max: 36.7 (2018 23:52)  T(F): 98.1 (2018 23:52), Max: 98.1 (2018 23:52)  HR: 77 (2018 23:52) (75 - 79)  BP: 144/75 (2018 23:52) (120/74 - 149/76)  BP(mean): --  RR: 18 (2018 23:52) (18 - 18)  SpO2: --        I&O's Detail    2018 07:01  -  2018 07:00  --------------------------------------------------------  IN:    Oral Fluid: 440 mL  Total IN: 440 mL    OUT:    Stool: 250 mL    Voided: 1200 mL  Total OUT: 1450 mL    Total NET: -1010 mL      2018 07:01  -  2018 06:20  --------------------------------------------------------  IN:    IV PiggyBack: 200 mL    Oral Fluid: 600 mL  Total IN: 800 mL    OUT:    Voided: 1002 mL  Total OUT: 1002 mL    Total NET: -202 mL                             9.5    7.08  )-----------( 409      (  @ 01:33 )             29.0                10.5   8.96  )-----------( 440      (  @ 08:30 )             31.7                    139   |  107   |  27                 Ca: 8.4    BMP:   ----------------------------< 80     M.9   (18 @ 01:33)             3.4    |  21    | 3.2                Ph: x          MEDICATIONS  (STANDING):  amLODIPine   Tablet 10 milliGRAM(s) Oral every 24 hours  aspirin  chewable 81 milliGRAM(s) Oral daily  atorvastatin 80 milliGRAM(s) Oral at bedtime  calcium acetate 667 milliGRAM(s) Oral three times a day with meals  ceFAZolin   IVPB      ceFAZolin   IVPB 2000 milliGRAM(s) IV Intermittent every 8 hours  docusate sodium 100 milliGRAM(s) Oral three times a day  hydrALAZINE 50 milliGRAM(s) Oral every 6 hours  influenza   Vaccine 0.5 milliLiter(s) IntraMuscular once  labetalol 200 milliGRAM(s) Oral every 12 hours  lisinopril 20 milliGRAM(s) Oral daily  magnesium oxide 400 milliGRAM(s) Oral every 12 hours  potassium chloride  20 mEq/100 mL IVPB 20 milliEquivalent(s) IV Intermittent once  potassium chloride  20 mEq/100 mL IVPB 20 milliEquivalent(s) IV Intermittent once    MEDICATIONS  (PRN):  acetaminophen   Tablet. 650 milliGRAM(s) Oral every 4 hours PRN Mild Pain (1 - 3)  ondansetron Injectable 4 milliGRAM(s) IV Push every 8 hours PRN Nausea and/or Vomiting          Diet, Renal Restrictions:   For patients receiving Renal Replacement - No Protein Restr, No Conc K, No Conc Phos, Low Sodium (18 @ 17:11)      General Appearance: Appears well, NAD  Neck: Supple  Chest: Equal expansion bilaterally, equal breath sounds  CV: S1, S2, RRR  Abdomen: Soft, NT, ND  Extremities: Grossly symmetric, WNL  Neuro: A&Ox3  Incision: dressed, dry    Assessment/Plan: Discharge to SNF. Patient wishes to go to [place].

## 2018-02-13 NOTE — PROGRESS NOTE ADULT - ASSESSMENT
DIONICIO/ATN, PIGN on biopsy/CAD/ bacteremia:  # off HD  # creatinine continues to improve  # BP at goal   # last  K 3.2  # h/h noted no need for LYNDA  # ?d/c plan

## 2018-02-13 NOTE — PROGRESS NOTE ADULT - SUBJECTIVE AND OBJECTIVE BOX
patient seen and examined   no distress     Standing Inpatient Medications  amLODIPine   Tablet 10 milliGRAM(s) Oral every 24 hours  aspirin  chewable 81 milliGRAM(s) Oral daily  atorvastatin 80 milliGRAM(s) Oral at bedtime  calcium acetate 667 milliGRAM(s) Oral three times a day with meals  ceFAZolin   IVPB      ceFAZolin   IVPB 2000 milliGRAM(s) IV Intermittent every 8 hours  docusate sodium 100 milliGRAM(s) Oral three times a day  hydrALAZINE 50 milliGRAM(s) Oral every 6 hours  influenza   Vaccine 0.5 milliLiter(s) IntraMuscular once  labetalol 200 milliGRAM(s) Oral every 12 hours  lisinopril 20 milliGRAM(s) Oral daily  magnesium oxide 400 milliGRAM(s) Oral every 12 hours  potassium chloride  20 mEq/100 mL IVPB 20 milliEquivalent(s) IV Intermittent once    PRN Inpatient Medications  acetaminophen   Tablet. 650 milliGRAM(s) Oral every 4 hours PRN  ondansetron Injectable 4 milliGRAM(s) IV Push every 8 hours PRN  potassium chloride  20 mEq/100 mL IVPB 20 milliEquivalent(s) IV Intermittent once PRN      VITALS/PHYSICAL EXAM  --------------------------------------------------------------------------------  T(C): 36.7 (02-12-18 @ 23:52), Max: 36.7 (02-12-18 @ 23:52)  HR: 77 (02-12-18 @ 23:52) (77 - 79)  BP: 144/75 (02-12-18 @ 23:52) (120/74 - 144/75)  RR: 18 (02-12-18 @ 23:52) (18 - 18)    02-12-18 @ 07:01  -  02-13-18 @ 07:00  --------------------------------------------------------  IN: 800 mL / OUT: 1003 mL / NET: -203 mL      Physical Exam:  	Gen: NAD, well-appearing  	Pulm: CTA B/L  	CV: RRR, S1S2; no rub  	Abd: nondistended  	LE: + edema left leg   	  	    LABS/STUDIES  --------------------------------------------------------------------------------              9.5    7.08  >-----------<  409      [02-13-18 @ 01:33]              29.0     139  |  107  |  27  ----------------------------<  80      [02-13-18 @ 01:33]  3.4   |  21  |  3.2        Ca     8.4     [02-13-18 @ 01:33]      Mg     1.9     [02-13-18 @ 01:33]            Creatinine Trend:  SCr 3.2 [02-13 @ 01:33]  SCr 3.4 [02-11 @ 08:30]  SCr 3.7 [02-09 @ 21:47]  SCr 3.7 [02-09 @ 13:06]  SCr 4.0 [02-08 @ 00:54]

## 2018-02-14 LAB
ANION GAP SERPL CALC-SCNC: 9 MMOL/L — SIGNIFICANT CHANGE UP (ref 7–14)
BUN SERPL-MCNC: 28 MG/DL — HIGH (ref 10–20)
CALCIUM SERPL-MCNC: 8 MG/DL — LOW (ref 8.5–10.1)
CHLORIDE SERPL-SCNC: 107 MMOL/L — SIGNIFICANT CHANGE UP (ref 98–110)
CO2 SERPL-SCNC: 22 MMOL/L — SIGNIFICANT CHANGE UP (ref 17–32)
CREAT SERPL-MCNC: 2.9 MG/DL — HIGH (ref 0.7–1.5)
GLUCOSE SERPL-MCNC: 80 MG/DL — SIGNIFICANT CHANGE UP (ref 70–110)
HCT VFR BLD CALC: 25.9 % — LOW (ref 37–47)
HGB BLD-MCNC: 8.5 G/DL — LOW (ref 14–18)
MAGNESIUM SERPL-MCNC: 1.7 MG/DL — LOW (ref 1.8–2.4)
MCHC RBC-ENTMCNC: 28.8 PG — SIGNIFICANT CHANGE UP (ref 27–31)
MCHC RBC-ENTMCNC: 32.8 G/DL — SIGNIFICANT CHANGE UP (ref 32–37)
MCV RBC AUTO: 87.8 FL — SIGNIFICANT CHANGE UP (ref 81–91)
NRBC # BLD: 0 /100 WBCS — SIGNIFICANT CHANGE UP (ref 0–0)
PHOSPHATE SERPL-MCNC: 2.2 MG/DL — SIGNIFICANT CHANGE UP (ref 2.1–4.9)
PLATELET # BLD AUTO: 339 K/UL — SIGNIFICANT CHANGE UP (ref 130–400)
POTASSIUM SERPL-MCNC: 3.5 MMOL/L — SIGNIFICANT CHANGE UP (ref 3.5–5)
POTASSIUM SERPL-SCNC: 3.5 MMOL/L — SIGNIFICANT CHANGE UP (ref 3.5–5)
RBC # BLD: 2.95 M/UL — LOW (ref 4.2–5.4)
RBC # FLD: 16.2 % — HIGH (ref 11.5–14.5)
SODIUM SERPL-SCNC: 138 MMOL/L — SIGNIFICANT CHANGE UP (ref 135–146)
WBC # BLD: 7.39 K/UL — SIGNIFICANT CHANGE UP (ref 4.8–10.8)
WBC # FLD AUTO: 7.39 K/UL — SIGNIFICANT CHANGE UP (ref 4.8–10.8)

## 2018-02-14 RX ORDER — POTASSIUM CHLORIDE 20 MEQ
20 PACKET (EA) ORAL ONCE
Qty: 0 | Refills: 0 | Status: COMPLETED | OUTPATIENT
Start: 2018-02-14 | End: 2018-02-14

## 2018-02-14 RX ADMIN — Medication 50 MILLIGRAM(S): at 18:00

## 2018-02-14 RX ADMIN — Medication 81 MILLIGRAM(S): at 12:02

## 2018-02-14 RX ADMIN — Medication 200 MILLIGRAM(S): at 18:02

## 2018-02-14 RX ADMIN — Medication 650 MILLIGRAM(S): at 06:19

## 2018-02-14 RX ADMIN — Medication 650 MILLIGRAM(S): at 07:08

## 2018-02-14 RX ADMIN — Medication 100 MILLIGRAM(S): at 05:39

## 2018-02-14 RX ADMIN — Medication 50 MILLIEQUIVALENT(S): at 12:01

## 2018-02-14 RX ADMIN — Medication 100 MILLIGRAM(S): at 14:05

## 2018-02-14 RX ADMIN — Medication 667 MILLIGRAM(S): at 18:02

## 2018-02-14 RX ADMIN — LISINOPRIL 20 MILLIGRAM(S): 2.5 TABLET ORAL at 05:36

## 2018-02-14 RX ADMIN — AMLODIPINE BESYLATE 10 MILLIGRAM(S): 2.5 TABLET ORAL at 12:03

## 2018-02-14 RX ADMIN — MAGNESIUM OXIDE 400 MG ORAL TABLET 400 MILLIGRAM(S): 241.3 TABLET ORAL at 18:00

## 2018-02-14 RX ADMIN — Medication 100 MILLIGRAM(S): at 21:57

## 2018-02-14 RX ADMIN — Medication 50 MILLIGRAM(S): at 05:36

## 2018-02-14 RX ADMIN — Medication 50 MILLIGRAM(S): at 12:02

## 2018-02-14 RX ADMIN — MAGNESIUM OXIDE 400 MG ORAL TABLET 400 MILLIGRAM(S): 241.3 TABLET ORAL at 06:17

## 2018-02-14 RX ADMIN — Medication 200 MILLIGRAM(S): at 05:39

## 2018-02-14 RX ADMIN — Medication 650 MILLIGRAM(S): at 19:10

## 2018-02-14 RX ADMIN — Medication 50 MILLIGRAM(S): at 23:39

## 2018-02-14 RX ADMIN — ATORVASTATIN CALCIUM 80 MILLIGRAM(S): 80 TABLET, FILM COATED ORAL at 21:57

## 2018-02-14 RX ADMIN — Medication 100 MILLIGRAM(S): at 05:36

## 2018-02-14 RX ADMIN — Medication 667 MILLIGRAM(S): at 12:03

## 2018-02-14 RX ADMIN — Medication 667 MILLIGRAM(S): at 09:59

## 2018-02-14 RX ADMIN — Medication 100 MILLIGRAM(S): at 14:04

## 2018-02-14 NOTE — PROGRESS NOTE ADULT - SUBJECTIVE AND OBJECTIVE BOX
Vital Signs Last 24 Hrs  T(C): 36.6 (2018 07:34), Max: 36.6 (2018 16:00)  T(F): 97.8 (2018 07:34), Max: 97.8 (2018 16:00)  HR: 83 (2018 07:34) (83 - 92)  BP: 143/76 (2018 07:34) (137/79 - 170/87)  BP(mean): --  RR: 18 (2018 07:34) (17 - 18)  SpO2: --        I&O's Detail    2018 07:01  -  2018 07:00  --------------------------------------------------------  IN:    IV PiggyBack: 100 mL  Total IN: 100 mL    OUT:    Voided: 3 mL  Total OUT: 3 mL    Total NET: 97 mL                             8.5    7.39  )-----------( 339      (  @ 01:52 )             25.9                9.5    7.08  )-----------( 409      (  @ 01:33 )             29.0                    138   |  107   |  28                 Ca: 8.0    BMP:   ----------------------------< 80     M.7   (18 @ 01:52)             3.5    |  22    | 2.9                Ph: 2.2      MEDICATIONS  (STANDING):  amLODIPine   Tablet 10 milliGRAM(s) Oral every 24 hours  aspirin  chewable 81 milliGRAM(s) Oral daily  atorvastatin 80 milliGRAM(s) Oral at bedtime  calcium acetate 667 milliGRAM(s) Oral three times a day with meals  ceFAZolin   IVPB      ceFAZolin   IVPB 2000 milliGRAM(s) IV Intermittent every 8 hours  docusate sodium 100 milliGRAM(s) Oral three times a day  hydrALAZINE 50 milliGRAM(s) Oral every 6 hours  influenza   Vaccine 0.5 milliLiter(s) IntraMuscular once  labetalol 200 milliGRAM(s) Oral every 12 hours  lisinopril 20 milliGRAM(s) Oral daily  magnesium oxide 400 milliGRAM(s) Oral every 12 hours  potassium chloride  20 mEq/100 mL IVPB 20 milliEquivalent(s) IV Intermittent once    MEDICATIONS  (PRN):  acetaminophen   Tablet. 650 milliGRAM(s) Oral every 4 hours PRN Mild Pain (1 - 3)  ondansetron Injectable 4 milliGRAM(s) IV Push every 8 hours PRN Nausea and/or Vomiting    Diet, Renal Restrictions:   For patients receiving Renal Replacement - No Protein Restr, No Conc K, No Conc Phos, Low Sodium (18 @ 17:11)      General Appearance: Appears well, NAD  Neck: Supple  Chest: Equal expansion bilaterally, equal breath sounds  CV: S1, S2, RRR  Abdomen: Soft, NT, ND  Extremities: Grossly symmetric, WNL  Neuro: A&Ox3  Wound: Slight oozing, healing    Assessment/Plan: Dressing on wound changed. Waiting for authorization for DALIA Khoury.

## 2018-02-14 NOTE — PROGRESS NOTE ADULT - ATTENDING COMMENTS
- HTN: continue adjusting as needed   - S/P PICC LINE - ANCEF X 6 WEEKS.  - RENAL: no need for RRT  - pending sw for snf  - PODIATRY FOLLOWING.   - HOLD BRILLINTA FOR NOW
Patient seen and examined independently , agree with fellow note A and P   1- DIONICIO secondary to PIGN , creat better no need for RRT
D/C dressing  betadine paint q48 hrs  continue to monitor erythema and WBC
marginal status dialysis on hold

## 2018-02-14 NOTE — PROGRESS NOTE ADULT - SUBJECTIVE AND OBJECTIVE BOX
Nephrology progress note    Patient is seen and examined, events over the last 24 h noted .    Allergies:  ciprofloxacin (Rash)  Keflex (Unknown)    Hospital Medications:   MEDICATIONS  (STANDING):  amLODIPine   Tablet 10 milliGRAM(s) Oral every 24 hours  aspirin  chewable 81 milliGRAM(s) Oral daily  atorvastatin 80 milliGRAM(s) Oral at bedtime  calcium acetate 667 milliGRAM(s) Oral three times a day with meals  ceFAZolin   IVPB      ceFAZolin   IVPB 2000 milliGRAM(s) IV Intermittent every 8 hours  docusate sodium 100 milliGRAM(s) Oral three times a day  hydrALAZINE 50 milliGRAM(s) Oral every 6 hours  influenza   Vaccine 0.5 milliLiter(s) IntraMuscular once  labetalol 200 milliGRAM(s) Oral every 12 hours  lisinopril 20 milliGRAM(s) Oral daily  magnesium oxide 400 milliGRAM(s) Oral every 12 hours    VITALS:  T(F): 97.8 (02-14-18 @ 07:34), Max: 97.8 (02-13-18 @ 16:00)  HR: 83 (02-14-18 @ 07:34)  BP: 143/76 (02-14-18 @ 07:34)  RR: 18 (02-14-18 @ 07:34)    02-12 @ 07:01  -  02-13 @ 07:00  --------------------------------------------------------  IN: 800 mL / OUT: 1003 mL / NET: -203 mL    02-13 @ 07:01  -  02-14 @ 07:00  --------------------------------------------------------  IN: 100 mL / OUT: 3 mL / NET: 97 mL    PHYSICAL EXAM:  Constitutional: NAD  HEENT: anicteric sclera, oropharynx clear, MMM  Neck: No JVD  Respiratory: CTAB, no wheezes, rales or rhonchi  Cardiovascular: S1, S2, RRR  Gastrointestinal: BS+, soft, NT/ND  Extremities: No cyanosis or clubbing. No peripheral edema  Neurological: A/O x 3, no focal deficits  : No CVA tenderness. No tineo.       LABS:  02-14    138  |  107  |  28<H>  ----------------------------<  80  3.5   |  22  |  2.9<H>    Ca    8.0<L>      14 Feb 2018 01:52  Phos  2.2     02-14  Mg     1.7     02-14                      8.5    7.39  )-----------( 339      ( 14 Feb 2018 01:52 )             25.9     Creatinine Trend: 2.9<--, 3.2<--, 3.4<--, 3.7<--, 3.7<--, 4.0<--      Urine Studies:    RADIOLOGY & ADDITIONAL STUDIES:  < from: Xray Chest 1 View-PORTABLE IMMEDIATE (02.08.18 @ 08:57) >  Impression:      No radiographic evidence of acute cardiopulmonary disease.    Unchanged.    SAMANTHA HEMPHILL M.D., ATTENDING RADIOLOGIST  This document has been electronically signed. Feb 8 2018  9:00AM    < end of copied text > Nephrology progress note    Patient is seen and examined, no acute events over the last 24 h noted .  no fever/chills, SOB, CP, hematuria, and abd pain.     Allergies:  ciprofloxacin (Rash)  Keflex (Unknown)    Hospital Medications:   MEDICATIONS  (STANDING):  amLODIPine   Tablet 10 milliGRAM(s) Oral every 24 hours  aspirin  chewable 81 milliGRAM(s) Oral daily  atorvastatin 80 milliGRAM(s) Oral at bedtime  calcium acetate 667 milliGRAM(s) Oral three times a day with meals  ceFAZolin   IVPB      ceFAZolin   IVPB 2000 milliGRAM(s) IV Intermittent every 8 hours  docusate sodium 100 milliGRAM(s) Oral three times a day  hydrALAZINE 50 milliGRAM(s) Oral every 6 hours  influenza   Vaccine 0.5 milliLiter(s) IntraMuscular once  labetalol 200 milliGRAM(s) Oral every 12 hours  lisinopril 20 milliGRAM(s) Oral daily  magnesium oxide 400 milliGRAM(s) Oral every 12 hours    VITALS:  T(F): 97.8 (02-14-18 @ 07:34), Max: 97.8 (02-13-18 @ 16:00)  HR: 83 (02-14-18 @ 07:34)  BP: 143/76 (02-14-18 @ 07:34)  RR: 18 (02-14-18 @ 07:34)    02-12 @ 07:01  -  02-13 @ 07:00  --------------------------------------------------------  IN: 800 mL / OUT: 1003 mL / NET: -203 mL    02-13 @ 07:01  -  02-14 @ 07:00  --------------------------------------------------------  IN: 100 mL / OUT: 3 mL / NET: 97 mL    PHYSICAL EXAM:  Constitutional: NAD  Neck: No JVD  Respiratory: CTAB, no wheezes, rales or rhonchi  Cardiovascular: S1, S2, RRR  Gastrointestinal: BS+, soft, NT/ND  Extremities: Incision on left leg, dry. no edema on right leg.  : No CVA tenderness. No tineo.       LABS:  02-14    138  |  107  |  28<H>  ----------------------------<  80  3.5   |  22  |  2.9<H>    Ca    8.0<L>      14 Feb 2018 01:52  Phos  2.2     02-14  Mg     1.7     02-14                      8.5    7.39  )-----------( 339      ( 14 Feb 2018 01:52 )             25.9     Creatinine Trend: 2.9<--, 3.2<--, 3.4<--, 3.7<--, 3.7<--, 4.0<--      Urine Studies:    RADIOLOGY & ADDITIONAL STUDIES:  < from: Xray Chest 1 View-PORTABLE IMMEDIATE (02.08.18 @ 08:57) >  Impression:      No radiographic evidence of acute cardiopulmonary disease.    Unchanged.    SAMANTHA HEMPHILL M.D., ATTENDING RADIOLOGIST  This document has been electronically signed. Feb 8 2018  9:00AM    < end of copied text >

## 2018-02-14 NOTE — PROGRESS NOTE ADULT - ASSESSMENT
# DIONICIO 2/2 PIGN/ATN: 2.9<--, 3.2<--, 3.4<--, 3.7<--, 3.7<--, 4.0<--  - monitor BMP and U/O closely  - no nephrotoxic meds   - no need for HD    # HTN well controlled   - monitor BP closely  - c/w antihypertensives  - low salt diet    # hypocalcemia and hypomagnesemia   - monitor Mg and Ca  - C/W magnesium oxide and calcium acetate # DIONICIO 2/2 PIGN/ATN: 2.9<--, 3.2<--, 3.4<--, 3.7<--, 3.7<--, 4.0<--  - monitor BMP and U/O closely  - no nephrotoxic meds   - no need for HD    # HTN well controlled   - monitor BP closely  - c/w antihypertensives  - low salt diet    # hypocalcemia better and hypomagnesemia improving  - monitor Mg and Ca  - C/W magnesium oxide and calcium acetate

## 2018-02-15 LAB
ANION GAP SERPL CALC-SCNC: 10 MMOL/L — SIGNIFICANT CHANGE UP (ref 7–14)
ANION GAP SERPL CALC-SCNC: 11 MMOL/L — SIGNIFICANT CHANGE UP (ref 7–14)
BUN SERPL-MCNC: 32 MG/DL — HIGH (ref 10–20)
BUN SERPL-MCNC: 34 MG/DL — HIGH (ref 10–20)
CALCIUM SERPL-MCNC: 8 MG/DL — LOW (ref 8.5–10.1)
CALCIUM SERPL-MCNC: 8.1 MG/DL — LOW (ref 8.5–10.1)
CHLORIDE SERPL-SCNC: 104 MMOL/L — SIGNIFICANT CHANGE UP (ref 98–110)
CHLORIDE SERPL-SCNC: 105 MMOL/L — SIGNIFICANT CHANGE UP (ref 98–110)
CO2 SERPL-SCNC: 22 MMOL/L — SIGNIFICANT CHANGE UP (ref 17–32)
CO2 SERPL-SCNC: 24 MMOL/L — SIGNIFICANT CHANGE UP (ref 17–32)
CREAT SERPL-MCNC: 2.6 MG/DL — HIGH (ref 0.7–1.5)
CREAT SERPL-MCNC: 2.6 MG/DL — HIGH (ref 0.7–1.5)
GLUCOSE SERPL-MCNC: 82 MG/DL — SIGNIFICANT CHANGE UP (ref 70–110)
GLUCOSE SERPL-MCNC: 84 MG/DL — SIGNIFICANT CHANGE UP (ref 70–110)
HCT VFR BLD CALC: 26.1 % — LOW (ref 37–47)
HGB BLD-MCNC: 8.3 G/DL — LOW (ref 14–18)
MAGNESIUM SERPL-MCNC: 1.7 MG/DL — LOW (ref 1.8–2.4)
MCHC RBC-ENTMCNC: 27.8 PG — SIGNIFICANT CHANGE UP (ref 27–31)
MCHC RBC-ENTMCNC: 31.8 G/DL — LOW (ref 32–37)
MCV RBC AUTO: 87.3 FL — SIGNIFICANT CHANGE UP (ref 81–91)
NRBC # BLD: 0 /100 WBCS — SIGNIFICANT CHANGE UP (ref 0–0)
PHOSPHATE SERPL-MCNC: 1.9 MG/DL — LOW (ref 2.1–4.9)
PLATELET # BLD AUTO: 338 K/UL — SIGNIFICANT CHANGE UP (ref 130–400)
POTASSIUM SERPL-MCNC: 3.6 MMOL/L — SIGNIFICANT CHANGE UP (ref 3.5–5)
POTASSIUM SERPL-MCNC: 3.7 MMOL/L — SIGNIFICANT CHANGE UP (ref 3.5–5)
POTASSIUM SERPL-SCNC: 3.6 MMOL/L — SIGNIFICANT CHANGE UP (ref 3.5–5)
POTASSIUM SERPL-SCNC: 3.7 MMOL/L — SIGNIFICANT CHANGE UP (ref 3.5–5)
RBC # BLD: 2.99 M/UL — LOW (ref 4.2–5.4)
RBC # FLD: 16.5 % — HIGH (ref 11.5–14.5)
SODIUM SERPL-SCNC: 137 MMOL/L — SIGNIFICANT CHANGE UP (ref 135–146)
SODIUM SERPL-SCNC: 139 MMOL/L — SIGNIFICANT CHANGE UP (ref 135–146)
WBC # BLD: 6.56 K/UL — SIGNIFICANT CHANGE UP (ref 4.8–10.8)
WBC # FLD AUTO: 6.56 K/UL — SIGNIFICANT CHANGE UP (ref 4.8–10.8)

## 2018-02-15 RX ORDER — POTASSIUM CHLORIDE 20 MEQ
20 PACKET (EA) ORAL ONCE
Qty: 0 | Refills: 0 | Status: COMPLETED | OUTPATIENT
Start: 2018-02-15 | End: 2018-02-16

## 2018-02-15 RX ADMIN — Medication 667 MILLIGRAM(S): at 18:50

## 2018-02-15 RX ADMIN — Medication 667 MILLIGRAM(S): at 08:38

## 2018-02-15 RX ADMIN — Medication 100 MILLIGRAM(S): at 21:47

## 2018-02-15 RX ADMIN — LISINOPRIL 20 MILLIGRAM(S): 2.5 TABLET ORAL at 05:22

## 2018-02-15 RX ADMIN — MAGNESIUM OXIDE 400 MG ORAL TABLET 400 MILLIGRAM(S): 241.3 TABLET ORAL at 19:12

## 2018-02-15 RX ADMIN — Medication 50 MILLIGRAM(S): at 11:30

## 2018-02-15 RX ADMIN — Medication 50 MILLIGRAM(S): at 05:21

## 2018-02-15 RX ADMIN — Medication 100 MILLIGRAM(S): at 13:17

## 2018-02-15 RX ADMIN — Medication 200 MILLIGRAM(S): at 18:50

## 2018-02-15 RX ADMIN — Medication 650 MILLIGRAM(S): at 22:51

## 2018-02-15 RX ADMIN — Medication 667 MILLIGRAM(S): at 11:31

## 2018-02-15 RX ADMIN — Medication 81 MILLIGRAM(S): at 11:30

## 2018-02-15 RX ADMIN — Medication 200 MILLIGRAM(S): at 06:27

## 2018-02-15 RX ADMIN — ATORVASTATIN CALCIUM 80 MILLIGRAM(S): 80 TABLET, FILM COATED ORAL at 21:47

## 2018-02-15 RX ADMIN — Medication 100 MILLIGRAM(S): at 13:16

## 2018-02-15 RX ADMIN — MAGNESIUM OXIDE 400 MG ORAL TABLET 400 MILLIGRAM(S): 241.3 TABLET ORAL at 05:22

## 2018-02-15 RX ADMIN — AMLODIPINE BESYLATE 10 MILLIGRAM(S): 2.5 TABLET ORAL at 08:39

## 2018-02-15 RX ADMIN — Medication 100 MILLIGRAM(S): at 05:20

## 2018-02-15 RX ADMIN — Medication 50 MILLIGRAM(S): at 18:50

## 2018-02-15 NOTE — PROGRESS NOTE ADULT - SUBJECTIVE AND OBJECTIVE BOX
Nephrology progress note    Patient is seen and examined, events over the last 24 h noted .    Allergies:  ciprofloxacin (Rash)  Keflex (Unknown)    Hospital Medications:   MEDICATIONS  (STANDING):  amLODIPine   Tablet 10 milliGRAM(s) Oral every 24 hours  aspirin  chewable 81 milliGRAM(s) Oral daily  atorvastatin 80 milliGRAM(s) Oral at bedtime  calcium acetate 667 milliGRAM(s) Oral three times a day with meals  ceFAZolin   IVPB      ceFAZolin   IVPB 2000 milliGRAM(s) IV Intermittent every 8 hours  docusate sodium 100 milliGRAM(s) Oral three times a day  hydrALAZINE 50 milliGRAM(s) Oral every 6 hours  influenza   Vaccine 0.5 milliLiter(s) IntraMuscular once  labetalol 200 milliGRAM(s) Oral every 12 hours  lisinopril 20 milliGRAM(s) Oral daily  magnesium oxide 400 milliGRAM(s) Oral every 12 hours  potassium chloride    Tablet ER 20 milliEquivalent(s) Oral once        VITALS:  T(F): 98.1 (02-15-18 @ 16:00), Max: 98.1 (02-14-18 @ 23:29)  HR: 94 (02-15-18 @ 16:00)  BP: 168/87 (02-15-18 @ 16:00)  RR: 17 (02-15-18 @ 16:00)  SpO2: --  Wt(kg): --    02-13 @ 07:01  -  02-14 @ 07:00  --------------------------------------------------------  IN: 100 mL / OUT: 3 mL / NET: 97 mL    02-14 @ 07:01  -  02-15 @ 07:00  --------------------------------------------------------  IN: 580 mL / OUT: 0 mL / NET: 580 mL    02-15 @ 07:01  -  02-15 @ 23:04  --------------------------------------------------------  IN: 360 mL / OUT: 400 mL / NET: -40 mL          PHYSICAL EXAM:  Constitutional: NAD  HEENT: anicteric sclera, oropharynx clear, MMM  Neck: No JVD  Respiratory: CTAB, no wheezes, rales or rhonchi  Cardiovascular: S1, S2, RRR  Gastrointestinal: BS+, soft, NT/ND  Extremities: No cyanosis or clubbing. No peripheral edema  Neurological: A/O x 3, no focal deficits  : No CVA tenderness. No tineo.   Skin: No rashes    LABS:  02-15    139  |  105  |  32<H>  ----------------------------<  82  3.7   |  24  |  2.6<H>    Ca    8.0<L>      15 Feb 2018 02:26  Phos  1.9     02-15  Mg     1.7     02-15                            8.3    6.56  )-----------( 338      ( 15 Feb 2018 02:26 )             26.1       Urine Studies:      RADIOLOGY & ADDITIONAL STUDIES:

## 2018-02-15 NOTE — PROGRESS NOTE ADULT - SUBJECTIVE AND OBJECTIVE BOX
24H EVENTS: Stable, still pending authorization for SNF. Asked to see podiatry who will try to see patient but recommend outpatient follow up as nothing to do inpatient.    Vital Signs Last 24 Hrs  T(C): 36.6 (15 Feb 2018 07:44), Max: 36.7 (2018 23:29)  T(F): 97.9 (15 Feb 2018 07:44), Max: 98.1 (2018 23:29)  HR: 84 (15 Feb 2018 07:44) (83 - 97)  BP: 148/74 (15 Feb 2018 07:44) (133/75 - 180/88)  BP(mean): --  RR: 18 (15 Feb 2018 07:44) (17 - 18)  SpO2: --      I&O's Detail    2018 07:01  -  15 Feb 2018 07:00  --------------------------------------------------------  IN:    IV PiggyBack: 100 mL    Oral Fluid: 480 mL  Total IN: 580 mL    OUT:  Total OUT: 0 mL    Total NET: 580 mL                   8.3    6.56  )-----------( 338      ( 02-15 @ 02:26 )             26.1                8.5    7.39  )-----------( 339      (  @ 01:52 )             25.9                    139   |  105   |  32                 Ca: 8.0    BMP:   ----------------------------< 82     M.7   (02-15-18 @ 02:26)             3.7    |  24    | 2.6                Ph: 1.9              MEDICATIONS  (STANDING):  amLODIPine   Tablet 10 milliGRAM(s) Oral every 24 hours  aspirin  chewable 81 milliGRAM(s) Oral daily  atorvastatin 80 milliGRAM(s) Oral at bedtime  calcium acetate 667 milliGRAM(s) Oral three times a day with meals  ceFAZolin   IVPB      ceFAZolin   IVPB 2000 milliGRAM(s) IV Intermittent every 8 hours  docusate sodium 100 milliGRAM(s) Oral three times a day  hydrALAZINE 50 milliGRAM(s) Oral every 6 hours  influenza   Vaccine 0.5 milliLiter(s) IntraMuscular once  labetalol 200 milliGRAM(s) Oral every 12 hours  lisinopril 20 milliGRAM(s) Oral daily  magnesium oxide 400 milliGRAM(s) Oral every 12 hours  potassium chloride    Tablet ER 20 milliEquivalent(s) Oral once    MEDICATIONS  (PRN):  acetaminophen   Tablet. 650 milliGRAM(s) Oral every 4 hours PRN Mild Pain (1 - 3)  ondansetron Injectable 4 milliGRAM(s) IV Push every 8 hours PRN Nausea and/or Vomiting      Diet, Renal Restrictions:   For patients receiving Renal Replacement - No Protein Restr, No Conc K, No Conc Phos, Low Sodium (18 @ 17:11)      General Appearance: Appears well, NAD  Neck: Supple  Chest: Equal expansion bilaterally, equal breath sounds  CV: S1, S2, RRR  Abdomen: Soft, NT, ND  Extremities: Grossly symmetric, WNL  Neuro: A&Ox3  Incision: Clean, staples intact, no erythema    Assessment/Plan  Discharge today if insurance auth goes through. Arrange outpatient follow up with podiatry.

## 2018-02-15 NOTE — PROGRESS NOTE ADULT - ASSESSMENT
# DIONICIO 2/2 PIGN/ATN:   - creatinine continues to improve 2.6<--2.9<--, 3.2<--, 3.4<--, 3.7<--, 3.7<--, 4.0<--  - monitor BMP and U/O closely  - no nephrotoxic meds     # HTN well controlled   - c/w antihypertensives    # hypocalcemia better and hypomagnesemia improving  - C/W magnesium oxide    # phosphorus is normal now  - Please D/C Calcium Acetate

## 2018-02-16 RX ORDER — POTASSIUM CHLORIDE 20 MEQ
10 PACKET (EA) ORAL ONCE
Qty: 0 | Refills: 0 | Status: COMPLETED | OUTPATIENT
Start: 2018-02-16 | End: 2018-02-16

## 2018-02-16 RX ADMIN — ONDANSETRON 4 MILLIGRAM(S): 8 TABLET, FILM COATED ORAL at 06:50

## 2018-02-16 RX ADMIN — MAGNESIUM OXIDE 400 MG ORAL TABLET 400 MILLIGRAM(S): 241.3 TABLET ORAL at 05:35

## 2018-02-16 RX ADMIN — Medication 650 MILLIGRAM(S): at 08:23

## 2018-02-16 RX ADMIN — AMLODIPINE BESYLATE 10 MILLIGRAM(S): 2.5 TABLET ORAL at 08:22

## 2018-02-16 RX ADMIN — Medication 100 MILLIGRAM(S): at 13:13

## 2018-02-16 RX ADMIN — Medication 100 MILLIGRAM(S): at 05:32

## 2018-02-16 RX ADMIN — MAGNESIUM OXIDE 400 MG ORAL TABLET 400 MILLIGRAM(S): 241.3 TABLET ORAL at 00:09

## 2018-02-16 RX ADMIN — Medication 50 MILLIGRAM(S): at 05:32

## 2018-02-16 RX ADMIN — Medication 50 MILLIGRAM(S): at 00:07

## 2018-02-16 RX ADMIN — MAGNESIUM OXIDE 400 MG ORAL TABLET 400 MILLIGRAM(S): 241.3 TABLET ORAL at 17:45

## 2018-02-16 RX ADMIN — Medication 81 MILLIGRAM(S): at 11:35

## 2018-02-16 RX ADMIN — Medication 50 MILLIGRAM(S): at 11:35

## 2018-02-16 RX ADMIN — Medication 650 MILLIGRAM(S): at 13:13

## 2018-02-16 RX ADMIN — Medication 10 MILLIEQUIVALENT(S): at 11:31

## 2018-02-16 RX ADMIN — LISINOPRIL 20 MILLIGRAM(S): 2.5 TABLET ORAL at 05:32

## 2018-02-16 RX ADMIN — ATORVASTATIN CALCIUM 80 MILLIGRAM(S): 80 TABLET, FILM COATED ORAL at 21:26

## 2018-02-16 RX ADMIN — Medication 100 MILLIGRAM(S): at 21:26

## 2018-02-16 RX ADMIN — Medication 200 MILLIGRAM(S): at 17:21

## 2018-02-16 RX ADMIN — Medication 100 MILLIGRAM(S): at 05:31

## 2018-02-16 RX ADMIN — Medication 20 MILLIEQUIVALENT(S): at 11:31

## 2018-02-16 RX ADMIN — Medication 50 MILLIGRAM(S): at 17:21

## 2018-02-16 RX ADMIN — Medication 200 MILLIGRAM(S): at 05:32

## 2018-02-16 NOTE — PROGRESS NOTE ADULT - SUBJECTIVE AND OBJECTIVE BOX
S/P EXCISION OF INFECTED PTFE OBTURATOR GRAFT, AND REDO BYPASS WITH REVERSE SAPH VEIN , S/P TDC REMOVAL , NEW  FOCAL DISSECTION OF MID RT EXT ILIAC ART    Vital Signs Last 24 Hrs  T(C): 37 (15 Feb 2018 23:44), Max: 37 (15 Feb 2018 23:44)  T(F): 98.6 (15 Feb 2018 23:44), Max: 98.6 (15 Feb 2018 23:44)  HR: 94 (15 Feb 2018 23:44) (94 - 94)  BP: 170/84 (15 Feb 2018 23:44) (168/87 - 170/84)  BP(mean): --  RR: 18 (15 Feb 2018 23:44) (17 - 18)  SpO2: --    I&O's Detail    15 Feb 2018 07:01  -  2018 07:00  --------------------------------------------------------  IN:    Oral Fluid: 360 mL  Total IN: 360 mL    OUT:    Voided: 400 mL  Total OUT: 400 mL    Total NET: -40 mL               8.3    6.56  )-----------( 338      ( 02-15 @ 02:26 )             26.1                    139   |  105   |  32                 Ca: 8.0    BMP:   ----------------------------< 82     M.7   (02-15-18 @ 02:26)             3.7    |  24    | 2.6                Ph: 1.9        MEDICATIONS  (STANDING):  amLODIPine   Tablet 10 milliGRAM(s) Oral every 24 hours  aspirin  chewable 81 milliGRAM(s) Oral daily  atorvastatin 80 milliGRAM(s) Oral at bedtime  ceFAZolin   IVPB      ceFAZolin   IVPB 2000 milliGRAM(s) IV Intermittent every 8 hours  docusate sodium 100 milliGRAM(s) Oral three times a day  hydrALAZINE 50 milliGRAM(s) Oral every 6 hours  influenza   Vaccine 0.5 milliLiter(s) IntraMuscular once  labetalol 200 milliGRAM(s) Oral every 12 hours  lisinopril 20 milliGRAM(s) Oral daily  magnesium oxide 400 milliGRAM(s) Oral every 12 hours  potassium chloride    Tablet ER 20 milliEquivalent(s) Oral once    MEDICATIONS  (PRN):  acetaminophen   Tablet. 650 milliGRAM(s) Oral every 4 hours PRN Mild Pain (1 - 3)  ondansetron Injectable 4 milliGRAM(s) IV Push every 8 hours PRN Nausea and/or Vomiting      Diet, Renal Restrictions:   For patients receiving Renal Replacement - No Protein Restr, No Conc K, No Conc Phos, Low Sodium (18 @ 17:11)      PHYSICAL EXAM:  General Appearance: Appears well, NAD  Neck: Supple  Chest: Equal expansion bilaterally, equal breath sounds  CV: S1, S2, RRR  Abdomen: Soft, NT, ND  Extremities: Grossly symmetric, WNL  Neuro: A&Ox3  Incisions: dry, healing, staples intact    Assessment: Doing well, still pending authorization for SNF. Doing better from a renal standpoint w creatinine down to 2.6. Dc'd calcium acetate per renal recommendations.

## 2018-02-17 LAB
ANION GAP SERPL CALC-SCNC: 8 MMOL/L — SIGNIFICANT CHANGE UP (ref 7–14)
BASOPHILS # BLD AUTO: 0.08 K/UL — SIGNIFICANT CHANGE UP (ref 0–0.2)
BASOPHILS NFR BLD AUTO: 1.2 % — HIGH (ref 0–1)
BUN SERPL-MCNC: 25 MG/DL — HIGH (ref 10–20)
CALCIUM SERPL-MCNC: 8.3 MG/DL — LOW (ref 8.5–10.1)
CHLORIDE SERPL-SCNC: 106 MMOL/L — SIGNIFICANT CHANGE UP (ref 98–110)
CO2 SERPL-SCNC: 23 MMOL/L — SIGNIFICANT CHANGE UP (ref 17–32)
CREAT SERPL-MCNC: 2.2 MG/DL — HIGH (ref 0.7–1.5)
EOSINOPHIL # BLD AUTO: 0.4 K/UL — SIGNIFICANT CHANGE UP (ref 0–0.7)
EOSINOPHIL NFR BLD AUTO: 5.9 % — SIGNIFICANT CHANGE UP (ref 0–8)
GLUCOSE SERPL-MCNC: 85 MG/DL — SIGNIFICANT CHANGE UP (ref 70–110)
HCT VFR BLD CALC: 24.2 % — LOW (ref 37–47)
HGB BLD-MCNC: 7.9 G/DL — LOW (ref 14–18)
IMM GRANULOCYTES NFR BLD AUTO: 0.3 % — SIGNIFICANT CHANGE UP (ref 0.1–0.3)
LYMPHOCYTES # BLD AUTO: 1.39 K/UL — SIGNIFICANT CHANGE UP (ref 1.2–3.4)
LYMPHOCYTES # BLD AUTO: 20.6 % — SIGNIFICANT CHANGE UP (ref 20.5–51.1)
MAGNESIUM SERPL-MCNC: 1.9 MG/DL — SIGNIFICANT CHANGE UP (ref 1.8–2.4)
MCHC RBC-ENTMCNC: 28.7 PG — SIGNIFICANT CHANGE UP (ref 27–31)
MCHC RBC-ENTMCNC: 32.6 G/DL — SIGNIFICANT CHANGE UP (ref 32–37)
MCV RBC AUTO: 88 FL — SIGNIFICANT CHANGE UP (ref 81–91)
MONOCYTES # BLD AUTO: 0.54 K/UL — SIGNIFICANT CHANGE UP (ref 0.1–0.6)
MONOCYTES NFR BLD AUTO: 8 % — SIGNIFICANT CHANGE UP (ref 1.7–9.3)
NEUTROPHILS # BLD AUTO: 4.33 K/UL — SIGNIFICANT CHANGE UP (ref 1.4–6.5)
NEUTROPHILS NFR BLD AUTO: 64 % — SIGNIFICANT CHANGE UP (ref 42.2–75.2)
NRBC # BLD: 0 /100 WBCS — SIGNIFICANT CHANGE UP (ref 0–0)
PHOSPHATE SERPL-MCNC: 2.4 MG/DL — SIGNIFICANT CHANGE UP (ref 2.1–4.9)
PLATELET # BLD AUTO: 301 K/UL — SIGNIFICANT CHANGE UP (ref 130–400)
POTASSIUM SERPL-MCNC: 3.7 MMOL/L — SIGNIFICANT CHANGE UP (ref 3.5–5)
POTASSIUM SERPL-SCNC: 3.7 MMOL/L — SIGNIFICANT CHANGE UP (ref 3.5–5)
RBC # BLD: 2.75 M/UL — LOW (ref 4.2–5.4)
RBC # FLD: 16.5 % — HIGH (ref 11.5–14.5)
SODIUM SERPL-SCNC: 137 MMOL/L — SIGNIFICANT CHANGE UP (ref 135–146)
WBC # BLD: 6.76 K/UL — SIGNIFICANT CHANGE UP (ref 4.8–10.8)
WBC # FLD AUTO: 6.76 K/UL — SIGNIFICANT CHANGE UP (ref 4.8–10.8)

## 2018-02-17 RX ADMIN — Medication 81 MILLIGRAM(S): at 11:57

## 2018-02-17 RX ADMIN — ATORVASTATIN CALCIUM 80 MILLIGRAM(S): 80 TABLET, FILM COATED ORAL at 21:49

## 2018-02-17 RX ADMIN — Medication 50 MILLIGRAM(S): at 00:18

## 2018-02-17 RX ADMIN — Medication 50 MILLIGRAM(S): at 05:55

## 2018-02-17 RX ADMIN — Medication 650 MILLIGRAM(S): at 23:53

## 2018-02-17 RX ADMIN — Medication 50 MILLIGRAM(S): at 11:57

## 2018-02-17 RX ADMIN — Medication 100 MILLIGRAM(S): at 14:37

## 2018-02-17 RX ADMIN — LISINOPRIL 20 MILLIGRAM(S): 2.5 TABLET ORAL at 05:55

## 2018-02-17 RX ADMIN — MAGNESIUM OXIDE 400 MG ORAL TABLET 400 MILLIGRAM(S): 241.3 TABLET ORAL at 05:56

## 2018-02-17 RX ADMIN — Medication 200 MILLIGRAM(S): at 17:31

## 2018-02-17 RX ADMIN — Medication 50 MILLIGRAM(S): at 23:53

## 2018-02-17 RX ADMIN — Medication 200 MILLIGRAM(S): at 05:56

## 2018-02-17 RX ADMIN — Medication 100 MILLIGRAM(S): at 21:49

## 2018-02-17 RX ADMIN — MAGNESIUM OXIDE 400 MG ORAL TABLET 400 MILLIGRAM(S): 241.3 TABLET ORAL at 19:08

## 2018-02-17 RX ADMIN — AMLODIPINE BESYLATE 10 MILLIGRAM(S): 2.5 TABLET ORAL at 08:37

## 2018-02-17 RX ADMIN — Medication 50 MILLIGRAM(S): at 17:31

## 2018-02-17 RX ADMIN — Medication 100 MILLIGRAM(S): at 05:55

## 2018-02-17 NOTE — PROGRESS NOTE ADULT - SUBJECTIVE AND OBJECTIVE BOX
GENERAL SURGERY PROGRESS NOTE    Procedure/Dx: S/P EXCISION OF INFECTED PTFE OBTURATOR GRAFT, AND REDO BYPASS WITH REVERSE SAPH VEIN 1/31, S/P TDC REMOVAL 1/23, NEW  FOCAL DISSECTION OF MID RT EXT ILIAC ART    Events of past 24 hours: no acute events o/n     Vital Signs Last 24 Hrs  T(C): 36.9 (17 Feb 2018 00:01), Max: 37.2 (16 Feb 2018 15:25)  T(F): 98.5 (17 Feb 2018 00:01), Max: 99 (16 Feb 2018 15:25)  HR: 97 (17 Feb 2018 00:01) (91 - 100)  BP: 165/81 (17 Feb 2018 00:01) (146/86 - 176/86)  BP(mean): --  RR: 18 (17 Feb 2018 00:01) (18 - 18)  SpO2: --    Pain (0-10):            Pain Control Adequate: [] YES [] N    Diet, Renal Restrictions:   For patients receiving Renal Replacement - No Protein Restr, No Conc K, No Conc Phos, Low Sodium      02-15-18 @ 07:01  -  02-16-18 @ 07:00  --------------------------------------------------------  IN:    Oral Fluid: 360 mL  Total IN: 360 mL    OUT:    Voided: 400 mL  Total OUT: 400 mL    Total NET: -40 mL    Bowel Movement: : [x] YES [] NO  Flatus: : [x] YES [] NO    PHYSICAL EXAM  General Appearance: Appears well, NAD  Neck: Supple  Chest: Equal expansion bilaterally, equal breath sounds  CV: S1, S2, RRR  Abdomen: Soft, NT, ND  Extremities: Grossly symmetric, WNL  Neuro: A&Ox3  Incisions: dry, healing, staples intact    MEDICATIONS  (STANDING):  amLODIPine   Tablet 10 milliGRAM(s) Oral every 24 hours  aspirin  chewable 81 milliGRAM(s) Oral daily  atorvastatin 80 milliGRAM(s) Oral at bedtime  ceFAZolin   IVPB      ceFAZolin   IVPB 2000 milliGRAM(s) IV Intermittent every 8 hours  docusate sodium 100 milliGRAM(s) Oral three times a day  hydrALAZINE 50 milliGRAM(s) Oral every 6 hours  influenza   Vaccine 0.5 milliLiter(s) IntraMuscular once  labetalol 200 milliGRAM(s) Oral every 12 hours  lisinopril 20 milliGRAM(s) Oral daily  magnesium oxide 400 milliGRAM(s) Oral every 12 hours    MEDICATIONS  (PRN):  acetaminophen   Tablet. 650 milliGRAM(s) Oral every 4 hours PRN Mild Pain (1 - 3)  ondansetron Injectable 4 milliGRAM(s) IV Push every 8 hours PRN Nausea and/or Vomiting    DVT PROPHYLAXIS: [] YES [] NO   GI PROPHYLAXIS: [] YES [] NO   ANTICOAGULATION: [] YES [] NO   ANTIBIOTICS: [] YES [] NO ceFAZolin   IVPB    ceFAZolin   IVPB 2000 milliGRAM(s)    LAB/STUDIES:  CAPILLARY BLOOD GLUCOSE                  7.9    6.76  )-----------( 301      ( 17 Feb 2018 01:06 )             24.2     02-17    137  |  106  |  25<H>  ----------------------------<  85  3.7   |  23  |  2.2<H>    Ca    8.3<L>      17 Feb 2018 01:06  Phos  2.4     02-17  Mg     1.9     02-17

## 2018-02-17 NOTE — PROGRESS NOTE ADULT - ASSESSMENT
Doing well, still pending authorization for SNF. Doing better from a renal standpoint w creatinine down to 2.6. Dc'd calcium acetate per renal recommendations

## 2018-02-18 LAB
ANION GAP SERPL CALC-SCNC: 8 MMOL/L — SIGNIFICANT CHANGE UP (ref 7–14)
BUN SERPL-MCNC: 24 MG/DL — HIGH (ref 10–20)
CALCIUM SERPL-MCNC: 8.4 MG/DL — LOW (ref 8.5–10.1)
CHLORIDE SERPL-SCNC: 109 MMOL/L — SIGNIFICANT CHANGE UP (ref 98–110)
CO2 SERPL-SCNC: 23 MMOL/L — SIGNIFICANT CHANGE UP (ref 17–32)
CREAT SERPL-MCNC: 2.1 MG/DL — HIGH (ref 0.7–1.5)
GLUCOSE SERPL-MCNC: 76 MG/DL — SIGNIFICANT CHANGE UP (ref 70–110)
HCT VFR BLD CALC: 24.7 % — LOW (ref 37–47)
HGB BLD-MCNC: 7.9 G/DL — LOW (ref 14–18)
MAGNESIUM SERPL-MCNC: 1.9 MG/DL — SIGNIFICANT CHANGE UP (ref 1.8–2.4)
MCHC RBC-ENTMCNC: 28.3 PG — SIGNIFICANT CHANGE UP (ref 27–31)
MCHC RBC-ENTMCNC: 32 G/DL — SIGNIFICANT CHANGE UP (ref 32–37)
MCV RBC AUTO: 88.5 FL — SIGNIFICANT CHANGE UP (ref 81–91)
NRBC # BLD: 0 /100 WBCS — SIGNIFICANT CHANGE UP (ref 0–0)
PHOSPHATE SERPL-MCNC: 2.8 MG/DL — SIGNIFICANT CHANGE UP (ref 2.1–4.9)
PLATELET # BLD AUTO: 270 K/UL — SIGNIFICANT CHANGE UP (ref 130–400)
POTASSIUM SERPL-MCNC: 3.3 MMOL/L — LOW (ref 3.5–5)
POTASSIUM SERPL-SCNC: 3.3 MMOL/L — LOW (ref 3.5–5)
RBC # BLD: 2.79 M/UL — LOW (ref 4.2–5.4)
RBC # FLD: 16.3 % — HIGH (ref 11.5–14.5)
SODIUM SERPL-SCNC: 140 MMOL/L — SIGNIFICANT CHANGE UP (ref 135–146)
WBC # BLD: 6.18 K/UL — SIGNIFICANT CHANGE UP (ref 4.8–10.8)
WBC # FLD AUTO: 6.18 K/UL — SIGNIFICANT CHANGE UP (ref 4.8–10.8)

## 2018-02-18 RX ORDER — POTASSIUM CHLORIDE 20 MEQ
20 PACKET (EA) ORAL ONCE
Qty: 0 | Refills: 0 | Status: COMPLETED | OUTPATIENT
Start: 2018-02-18 | End: 2018-02-18

## 2018-02-18 RX ADMIN — Medication 20 MILLIEQUIVALENT(S): at 12:14

## 2018-02-18 RX ADMIN — Medication 200 MILLIGRAM(S): at 05:48

## 2018-02-18 RX ADMIN — LISINOPRIL 20 MILLIGRAM(S): 2.5 TABLET ORAL at 05:48

## 2018-02-18 RX ADMIN — Medication 81 MILLIGRAM(S): at 12:16

## 2018-02-18 RX ADMIN — Medication 100 MILLIGRAM(S): at 05:48

## 2018-02-18 RX ADMIN — Medication 100 MILLIGRAM(S): at 21:37

## 2018-02-18 RX ADMIN — AMLODIPINE BESYLATE 10 MILLIGRAM(S): 2.5 TABLET ORAL at 09:34

## 2018-02-18 RX ADMIN — Medication 100 MILLIGRAM(S): at 16:35

## 2018-02-18 RX ADMIN — ATORVASTATIN CALCIUM 80 MILLIGRAM(S): 80 TABLET, FILM COATED ORAL at 21:37

## 2018-02-18 RX ADMIN — Medication 50 MILLIGRAM(S): at 17:49

## 2018-02-18 RX ADMIN — MAGNESIUM OXIDE 400 MG ORAL TABLET 400 MILLIGRAM(S): 241.3 TABLET ORAL at 05:48

## 2018-02-18 RX ADMIN — Medication 50 MILLIGRAM(S): at 12:16

## 2018-02-18 RX ADMIN — Medication 50 MILLIGRAM(S): at 05:48

## 2018-02-18 RX ADMIN — Medication 200 MILLIGRAM(S): at 17:49

## 2018-02-18 NOTE — PROGRESS NOTE ADULT - SUBJECTIVE AND OBJECTIVE BOX
Vital Signs Last 24 Hrs  T(C): 36.5 (2018 00:01), Max: 36.7 (2018 15:22)  T(F): 97.7 (2018 00:01), Max: 98 (2018 15:22)  HR: 94 (2018 00:01) (84 - 97)  BP: 170/91 (2018 00:01) (143/79 - 188/95)  BP(mean): --  RR: 18 (2018 00:01) (18 - 19)  SpO2: --        I&O's Detail    2018 07:01  -  2018 07:00  --------------------------------------------------------  IN:    Oral Fluid: 410 mL  Total IN: 410 mL    OUT:    Voided: 1 mL  Total OUT: 1 mL    Total NET: 409 mL      2018 07:01  -  2018 05:00  --------------------------------------------------------  IN:    Oral Fluid: 1410 mL  Total IN: 1410 mL    OUT:    Voided: 4 mL  Total OUT: 4 mL    Total NET: 1406 mL                   7.9    6.18  )-----------( 270      (  @ 01:30 )             24.7                7.9    6.76  )-----------( 301      (  @ 01:06 )             24.2                    140   |  109   |  24                 Ca: 8.4    BMP:   ----------------------------< 76     M.9   (18 @ 01:30)             3.3    |  23    | 2.1                Ph: 2.8        MEDICATIONS  (STANDING):  amLODIPine   Tablet 10 milliGRAM(s) Oral every 24 hours  aspirin  chewable 81 milliGRAM(s) Oral daily  atorvastatin 80 milliGRAM(s) Oral at bedtime  ceFAZolin   IVPB      ceFAZolin   IVPB 2000 milliGRAM(s) IV Intermittent every 8 hours  docusate sodium 100 milliGRAM(s) Oral three times a day  hydrALAZINE 50 milliGRAM(s) Oral every 6 hours  influenza   Vaccine 0.5 milliLiter(s) IntraMuscular once  labetalol 200 milliGRAM(s) Oral every 12 hours  lisinopril 20 milliGRAM(s) Oral daily  magnesium oxide 400 milliGRAM(s) Oral every 12 hours    MEDICATIONS  (PRN):  acetaminophen   Tablet. 650 milliGRAM(s) Oral every 4 hours PRN Mild Pain (1 - 3)  ondansetron Injectable 4 milliGRAM(s) IV Push every 8 hours PRN Nausea and/or Vomiting      Diet, Renal Restrictions:   For patients receiving Renal Replacement - No Protein Restr, No Conc K, No Conc Phos, Low Sodium (18 @ 17:11)      PHYSICAL EXAM:  General Appearance: Appears well, NAD  Neck: Supple  Chest: Equal expansion bilaterally, equal breath sounds  CV: S1, S2, RRR  Abdomen: Soft, NT, ND  Extremities: Grossly symmetric, WNL      Assessment/ Plan: Still awaiting social work authorization for SNF.

## 2018-02-19 LAB
ANION GAP SERPL CALC-SCNC: 10 MMOL/L — SIGNIFICANT CHANGE UP (ref 7–14)
BUN SERPL-MCNC: 19 MG/DL — SIGNIFICANT CHANGE UP (ref 10–20)
CALCIUM SERPL-MCNC: 8.8 MG/DL — SIGNIFICANT CHANGE UP (ref 8.5–10.1)
CHLORIDE SERPL-SCNC: 107 MMOL/L — SIGNIFICANT CHANGE UP (ref 98–110)
CO2 SERPL-SCNC: 24 MMOL/L — SIGNIFICANT CHANGE UP (ref 17–32)
CREAT SERPL-MCNC: 2 MG/DL — HIGH (ref 0.7–1.5)
GLUCOSE SERPL-MCNC: 87 MG/DL — SIGNIFICANT CHANGE UP (ref 70–110)
POTASSIUM SERPL-MCNC: 3.5 MMOL/L — SIGNIFICANT CHANGE UP (ref 3.5–5)
POTASSIUM SERPL-SCNC: 3.5 MMOL/L — SIGNIFICANT CHANGE UP (ref 3.5–5)
SODIUM SERPL-SCNC: 141 MMOL/L — SIGNIFICANT CHANGE UP (ref 135–146)

## 2018-02-19 RX ORDER — POTASSIUM CHLORIDE 20 MEQ
10 PACKET (EA) ORAL ONCE
Qty: 0 | Refills: 0 | Status: COMPLETED | OUTPATIENT
Start: 2018-02-19 | End: 2018-02-19

## 2018-02-19 RX ADMIN — Medication 10 MILLIEQUIVALENT(S): at 14:03

## 2018-02-19 RX ADMIN — Medication 200 MILLIGRAM(S): at 05:42

## 2018-02-19 RX ADMIN — Medication 50 MILLIGRAM(S): at 05:42

## 2018-02-19 RX ADMIN — MAGNESIUM OXIDE 400 MG ORAL TABLET 400 MILLIGRAM(S): 241.3 TABLET ORAL at 17:41

## 2018-02-19 RX ADMIN — Medication 50 MILLIGRAM(S): at 00:50

## 2018-02-19 RX ADMIN — Medication 50 MILLIGRAM(S): at 17:42

## 2018-02-19 RX ADMIN — AMLODIPINE BESYLATE 10 MILLIGRAM(S): 2.5 TABLET ORAL at 08:38

## 2018-02-19 RX ADMIN — Medication 100 MILLIGRAM(S): at 05:41

## 2018-02-19 RX ADMIN — LISINOPRIL 20 MILLIGRAM(S): 2.5 TABLET ORAL at 05:42

## 2018-02-19 RX ADMIN — MAGNESIUM OXIDE 400 MG ORAL TABLET 400 MILLIGRAM(S): 241.3 TABLET ORAL at 00:54

## 2018-02-19 RX ADMIN — Medication 100 MILLIGRAM(S): at 13:45

## 2018-02-19 RX ADMIN — Medication 50 MILLIGRAM(S): at 11:50

## 2018-02-19 RX ADMIN — Medication 200 MILLIGRAM(S): at 17:42

## 2018-02-19 RX ADMIN — Medication 81 MILLIGRAM(S): at 11:50

## 2018-02-19 RX ADMIN — Medication 100 MILLIGRAM(S): at 21:57

## 2018-02-19 RX ADMIN — Medication 50 MILLIGRAM(S): at 23:52

## 2018-02-19 RX ADMIN — MAGNESIUM OXIDE 400 MG ORAL TABLET 400 MILLIGRAM(S): 241.3 TABLET ORAL at 05:43

## 2018-02-19 RX ADMIN — ATORVASTATIN CALCIUM 80 MILLIGRAM(S): 80 TABLET, FILM COATED ORAL at 21:57

## 2018-02-19 NOTE — CHART NOTE - NSCHARTNOTEFT_GEN_A_CORE
Registered Dietitian Follow-Up    [id number]   LIMITED note.      *** Pertinent Medical Interventions:  (1) Awaiting SNF d/c  (2) no other intervention.      ***Pertinent Subjective Information:  (1) Labs and meds reviewed.  Cr appears to be lowered.  (2) Diet order remains the same for RENAL DIALYSIS despite multiple attempt to switch to PRE-RENAL (low protein).  (3) Son at bedside, reported patient is picky with her meals but generally eats one big lunch per son bringing sandwiches or other foods.  (4) No oral/GI problem at this time.  (5) Denies any supplements, but indicated that pt was previously on NEPRO. But not on dialysis.      Pt remains not at risk.

## 2018-02-19 NOTE — PROGRESS NOTE ADULT - SUBJECTIVE AND OBJECTIVE BOX
S/P EXCISION OF INFECTED PTFE OBTURATOR GRAFT, AND REDO BYPASS WITH REVERSE SAPH VEIN , S/P TDC REMOVAL , NEW  FOCAL DISSECTION OF MID RT EXT ILIAC ART    Vital Signs Last 24 Hrs  T(C): 36.8 (2018 07:34), Max: 36.8 (2018 07:34)  T(F): 98.2 (2018 07:34), Max: 98.2 (2018 07:34)  HR: 88 (2018 07:34) (84 - 89)  BP: 189/94 (2018 07:34) (127/61 - 189/94)  BP(mean): --  RR: 19 (2018 07:34) (16 - 19)  SpO2: --        I&O's Detail    2018 07:01  -  2018 07:00  --------------------------------------------------------  IN:    IV PiggyBack: 100 mL    Oral Fluid: 1550 mL  Total IN: 1650 mL    OUT:    Voided: 3 mL  Total OUT: 3 mL    Total NET: 1647 mL                             7.9    6.18  )-----------( 270      (  @ 01:30 )             24.7                    140   |  109   |  24                 Ca: 8.4    BMP:   ----------------------------< 76     M.9   (18 @ 01:30)             3.3    |  23    | 2.1                Ph: 2.8                                            MEDICATIONS  (STANDING):  amLODIPine   Tablet 10 milliGRAM(s) Oral every 24 hours  aspirin  chewable 81 milliGRAM(s) Oral daily  atorvastatin 80 milliGRAM(s) Oral at bedtime  ceFAZolin   IVPB      ceFAZolin   IVPB 2000 milliGRAM(s) IV Intermittent every 8 hours  docusate sodium 100 milliGRAM(s) Oral three times a day  hydrALAZINE 50 milliGRAM(s) Oral every 6 hours  influenza   Vaccine 0.5 milliLiter(s) IntraMuscular once  labetalol 200 milliGRAM(s) Oral every 12 hours  lisinopril 20 milliGRAM(s) Oral daily  magnesium oxide 400 milliGRAM(s) Oral every 12 hours    MEDICATIONS  (PRN):  acetaminophen   Tablet. 650 milliGRAM(s) Oral every 4 hours PRN Mild Pain (1 - 3)  ondansetron Injectable 4 milliGRAM(s) IV Push every 8 hours PRN Nausea and/or Vomiting      Diet, Renal Restrictions:   For patients receiving Renal Replacement - No Protein Restr, No Conc K, No Conc Phos, Low Sodium (18 @ 17:11)      PHYSICAL EXAM:  General Appearance: Appears well, NAD  Neck: Supple  Chest: Equal expansion bilaterally, equal breath sounds  CV: S1, S2, RRR  Abdomen: Soft, NT, ND  Extremities: Grossly symmetric, WNL  Neuro: A&Ox3    Assessment and Plan: Awaiting social work for discharge.

## 2018-02-20 ENCOUNTER — TRANSCRIPTION ENCOUNTER (OUTPATIENT)
Age: 53
End: 2018-02-20

## 2018-02-20 VITALS — WEIGHT: 178.57 LBS

## 2018-02-20 RX ORDER — LABETALOL HCL 100 MG
1 TABLET ORAL
Qty: 0 | Refills: 0 | COMMUNITY
Start: 2018-02-20

## 2018-02-20 RX ORDER — HYDRALAZINE HCL 50 MG
1 TABLET ORAL
Qty: 0 | Refills: 0 | COMMUNITY
Start: 2018-02-20

## 2018-02-20 RX ORDER — ASPIRIN/CALCIUM CARB/MAGNESIUM 324 MG
81 TABLET ORAL DAILY
Qty: 0 | Refills: 0 | Status: DISCONTINUED | OUTPATIENT
Start: 2018-02-20 | End: 2018-02-20

## 2018-02-20 RX ORDER — ACETAMINOPHEN 500 MG
2 TABLET ORAL
Qty: 0 | Refills: 0 | COMMUNITY
Start: 2018-02-20

## 2018-02-20 RX ORDER — METOPROLOL TARTRATE 50 MG
1 TABLET ORAL
Qty: 0 | Refills: 0 | COMMUNITY

## 2018-02-20 RX ORDER — LISINOPRIL 2.5 MG/1
20 TABLET ORAL DAILY
Qty: 0 | Refills: 0 | Status: DISCONTINUED | OUTPATIENT
Start: 2018-02-20 | End: 2018-02-20

## 2018-02-20 RX ADMIN — MAGNESIUM OXIDE 400 MG ORAL TABLET 400 MILLIGRAM(S): 241.3 TABLET ORAL at 05:59

## 2018-02-20 RX ADMIN — AMLODIPINE BESYLATE 10 MILLIGRAM(S): 2.5 TABLET ORAL at 09:22

## 2018-02-20 RX ADMIN — Medication 50 MILLIGRAM(S): at 17:36

## 2018-02-20 RX ADMIN — Medication 100 MILLIGRAM(S): at 13:13

## 2018-02-20 RX ADMIN — Medication 81 MILLIGRAM(S): at 13:13

## 2018-02-20 RX ADMIN — MAGNESIUM OXIDE 400 MG ORAL TABLET 400 MILLIGRAM(S): 241.3 TABLET ORAL at 17:36

## 2018-02-20 RX ADMIN — Medication 100 MILLIGRAM(S): at 05:57

## 2018-02-20 RX ADMIN — Medication 200 MILLIGRAM(S): at 05:58

## 2018-02-20 RX ADMIN — LISINOPRIL 20 MILLIGRAM(S): 2.5 TABLET ORAL at 17:36

## 2018-02-20 RX ADMIN — LISINOPRIL 20 MILLIGRAM(S): 2.5 TABLET ORAL at 05:57

## 2018-02-20 RX ADMIN — Medication 50 MILLIGRAM(S): at 05:57

## 2018-02-20 RX ADMIN — Medication 200 MILLIGRAM(S): at 17:36

## 2018-02-20 RX ADMIN — Medication 50 MILLIGRAM(S): at 13:13

## 2018-02-20 NOTE — PROGRESS NOTE ADULT - PROVIDER SPECIALTY LIST ADULT
Cardiology
Nephrology
Podiatry
Surgery
Vascular Surgery

## 2018-02-20 NOTE — DISCHARGE NOTE ADULT - ADDITIONAL INSTRUCTIONS
Dressing changes: COntinue dressing changes in groin with dry gauze, and  foot with packing and gauze. Dressing changes: COntinue dressing changes in groin with dry gauze, and  foot with  gauze. Follow up with: 1. Dr Sal vascular surgeon, 2. your PMD, 3.your cardiologist and 4.your nephrologist, all within 1 week. Staples to be removed outpatient in follow up with Dr Sal within 1 week. Call outpatient office, vascular surgery for any questions.

## 2018-02-20 NOTE — DISCHARGE NOTE ADULT - HOSPITAL COURSE
Admitted with infected graft in groin and groin wound. Treated with debridements and dressing changes. Improved. Treated with antibiotics initially. Podiatry saw patient for foot I and D, and patients status improved. Patient worked with PT rehab. Ambulated. Was discharged to SNF. Admitted with infected graft in groin and groin wound. S/P EXCISION OF INFECTED PTFE OBTURATOR GRAFT, AND REDO BYPASS WITH REVERSE SAPH VEIN 1/31, S/P TDC REMOVAL 1/23, NEW  FOCAL DISSECTION OF MID RT EXT ILIAC ART. Treated with debridements and dressing changes. Improved. Treated with antibiotics initially. Podiatry saw patient for foot I and D, and patients status improved. Patient worked with PT rehab. Ambulated. Was discharged to SNF for further recovery, and outpatient follow ups with vascular surgery, nephrology, cardiology and PMD.

## 2018-02-20 NOTE — DISCHARGE NOTE ADULT - PLAN OF CARE
Complete recovery To recover completely. Continue with dressing changes in left groin area(cover with gauze), as well as in right foot (packing and dry gauze over).

## 2018-02-20 NOTE — DISCHARGE NOTE ADULT - MEDICATION SUMMARY - MEDICATIONS TO TAKE
I will START or STAY ON the medications listed below when I get home from the hospital:    aspirin 81 mg oral tablet, chewable  -- 1 tab(s) by mouth once a day  -- Indication: For Cellulitis of foot, left    acetaminophen 325 mg oral tablet  -- 2 tab(s) by mouth every 4 hours, As needed, Mild Pain (1 - 3)  -- Indication: For Cellulitis of foot, left    lisinopril 20 mg oral tablet  -- 1 tab(s) by mouth once a day  -- Indication: For Cellulitis of foot, left    atorvastatin 80 mg oral tablet  -- 1 tab(s) by mouth once a day  -- Indication: For Cellulitis of foot, left    labetalol 200 mg oral tablet  -- 1 tab(s) by mouth every 12 hours  -- Indication: For Cellulitis of foot, left    amLODIPine 5 mg oral tablet  -- 1 tab(s) by mouth once a day  -- Indication: For Cellulitis of foot, left    hydrALAZINE 50 mg oral tablet  -- 1 tab(s) by mouth every 6 hours  -- Indication: For Cellulitis of foot, left

## 2018-02-20 NOTE — DISCHARGE NOTE ADULT - PATIENT PORTAL LINK FT
You can access the [email or web address] Portal, offered by [facility], by registering with the following website: [email or web address]

## 2018-02-20 NOTE — DISCHARGE NOTE ADULT - CARE PLAN
Principal Discharge DX:	Wound infection  Goal:	Complete recovery  Assessment and plan of treatment:	To recover completely. Continue with dressing changes in left groin area(cover with gauze), as well as in right foot (packing and dry gauze over).

## 2018-02-20 NOTE — DISCHARGE NOTE ADULT - CARE PROVIDER_API CALL
Rene Sal), Vascular Surgery  [address] [place] [address]  Phone: [phone]  Fax: [phone]

## 2018-02-20 NOTE — PROGRESS NOTE ADULT - SUBJECTIVE AND OBJECTIVE BOX
Vital Signs Last 24 Hrs  T(C): 35.9 (20 Feb 2018 07:35), Max: 36 (19 Feb 2018 15:32)  T(F): 96.6 (20 Feb 2018 07:35), Max: 96.8 (19 Feb 2018 15:32)  HR: 85 (20 Feb 2018 07:35) (79 - 85)  BP: 138/84 (20 Feb 2018 07:35) (138/84 - 171/80)  BP(mean): --  RR: 18 (20 Feb 2018 07:35) (18 - 19)  SpO2: --        I&O's Detail    19 Feb 2018 07:01  -  20 Feb 2018 07:00  --------------------------------------------------------  IN:    Oral Fluid: 300 mL  Total IN: 300 mL    OUT:  Total OUT: 0 mL    Total NET: 300 mL                   141   |  107   |  19                 Ca: 8.8    BMP:   ----------------------------< 87     Mg: x     (02-19-18 @ 07:53)             3.5    |  24    | 2.0                Ph: x          MEDICATIONS  (STANDING):  amLODIPine   Tablet 10 milliGRAM(s) Oral every 24 hours  aspirin  chewable 81 milliGRAM(s) Oral daily  atorvastatin 80 milliGRAM(s) Oral at bedtime  ceFAZolin   IVPB      ceFAZolin   IVPB 2000 milliGRAM(s) IV Intermittent every 8 hours  docusate sodium 100 milliGRAM(s) Oral three times a day  hydrALAZINE 50 milliGRAM(s) Oral every 6 hours  influenza   Vaccine 0.5 milliLiter(s) IntraMuscular once  labetalol 200 milliGRAM(s) Oral every 12 hours  lisinopril 20 milliGRAM(s) Oral daily  magnesium oxide 400 milliGRAM(s) Oral every 12 hours    MEDICATIONS  (PRN):  acetaminophen   Tablet. 650 milliGRAM(s) Oral every 4 hours PRN Mild Pain (1 - 3)  ondansetron Injectable 4 milliGRAM(s) IV Push every 8 hours PRN Nausea and/or Vomiting      Diet, Renal Restrictions:   For patients receiving Renal Replacement - No Protein Restr, No Conc K, No Conc Phos, Low Sodium (02-04-18 @ 17:11)      PHYSICAL EXAM:  General Appearance: NAD  Neck: Supple  Chest: Equal expansion bilaterally, equal breath sounds  CV: S1, S2, RRR  Abdomen: Soft, NT, ND  Extremities: Grossly symmetric, WNL  Neuro: A&Ox3      Assessment: Discharge once there is a bed at SNF, most likely today.

## 2018-02-26 DIAGNOSIS — T82.7XXA INFECTION AND INFLAMMATORY REACTION DUE TO OTHER CARDIAC AND VASCULAR DEVICES, IMPLANTS AND GRAFTS, INITIAL ENCOUNTER: ICD-10-CM

## 2018-02-26 DIAGNOSIS — E83.42 HYPOMAGNESEMIA: ICD-10-CM

## 2018-02-26 DIAGNOSIS — A41.9 SEPSIS, UNSPECIFIED ORGANISM: ICD-10-CM

## 2018-02-26 DIAGNOSIS — R65.21 SEVERE SEPSIS WITH SEPTIC SHOCK: ICD-10-CM

## 2018-02-26 DIAGNOSIS — E83.51 HYPOCALCEMIA: ICD-10-CM

## 2018-02-26 DIAGNOSIS — A41.01 SEPSIS DUE TO METHICILLIN SUSCEPTIBLE STAPHYLOCOCCUS AUREUS: ICD-10-CM

## 2018-02-26 DIAGNOSIS — N17.0 ACUTE KIDNEY FAILURE WITH TUBULAR NECROSIS: ICD-10-CM

## 2018-02-26 DIAGNOSIS — T81.4XXA INFECTION FOLLOWING A PROCEDURE, INITIAL ENCOUNTER: ICD-10-CM

## 2018-02-26 DIAGNOSIS — E87.6 HYPOKALEMIA: ICD-10-CM

## 2018-02-26 DIAGNOSIS — I72.4 ANEURYSM OF ARTERY OF LOWER EXTREMITY: ICD-10-CM

## 2018-02-27 DIAGNOSIS — I25.10 ATHEROSCLEROTIC HEART DISEASE OF NATIVE CORONARY ARTERY WITHOUT ANGINA PECTORIS: ICD-10-CM

## 2018-02-27 DIAGNOSIS — E87.2 ACIDOSIS: ICD-10-CM

## 2018-02-27 DIAGNOSIS — E46 UNSPECIFIED PROTEIN-CALORIE MALNUTRITION: ICD-10-CM

## 2018-02-27 DIAGNOSIS — Z88.1 ALLERGY STATUS TO OTHER ANTIBIOTIC AGENTS STATUS: ICD-10-CM

## 2018-02-27 DIAGNOSIS — L03.314 CELLULITIS OF GROIN: ICD-10-CM

## 2018-02-27 DIAGNOSIS — T81.718A COMPLICATION OF OTHER ARTERY FOLLOWING A PROCEDURE, NOT ELSEWHERE CLASSIFIED, INITIAL ENCOUNTER: ICD-10-CM
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
## 2018-02-27 DIAGNOSIS — T82.868A THROMBOSIS DUE TO VASCULAR PROSTHETIC DEVICES, IMPLANTS AND GRAFTS, INITIAL ENCOUNTER: ICD-10-CM

## 2018-02-27 DIAGNOSIS — E87.1 HYPO-OSMOLALITY AND HYPONATREMIA: ICD-10-CM
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
## 2018-02-27 DIAGNOSIS — Z95.5 PRESENCE OF CORONARY ANGIOPLASTY IMPLANT AND GRAFT: ICD-10-CM

## 2018-02-27 DIAGNOSIS — E78.5 HYPERLIPIDEMIA, UNSPECIFIED: ICD-10-CM

## 2018-02-27 DIAGNOSIS — I95.81 POSTPROCEDURAL HYPOTENSION: ICD-10-CM

## 2018-02-27 DIAGNOSIS — D63.1 ANEMIA IN CHRONIC KIDNEY DISEASE: ICD-10-CM

## 2018-02-27 DIAGNOSIS — N39.0 URINARY TRACT INFECTION, SITE NOT SPECIFIED: ICD-10-CM

## 2018-02-27 DIAGNOSIS — N18.5 CHRONIC KIDNEY DISEASE, STAGE 5: ICD-10-CM

## 2018-02-27 DIAGNOSIS — F17.210 NICOTINE DEPENDENCE, CIGARETTES, UNCOMPLICATED: ICD-10-CM

## 2018-02-27 DIAGNOSIS — E83.39 OTHER DISORDERS OF PHOSPHORUS METABOLISM: ICD-10-CM

## 2018-02-27 DIAGNOSIS — D62 ACUTE POSTHEMORRHAGIC ANEMIA: ICD-10-CM

## 2018-03-06 ENCOUNTER — APPOINTMENT (OUTPATIENT)
Dept: VASCULAR SURGERY | Facility: CLINIC | Age: 53
End: 2018-03-06
Payer: MEDICAID

## 2018-03-06 VITALS
HEIGHT: 63 IN | BODY MASS INDEX: 29.23 KG/M2 | SYSTOLIC BLOOD PRESSURE: 110 MMHG | DIASTOLIC BLOOD PRESSURE: 62 MMHG | WEIGHT: 165 LBS

## 2018-03-06 DIAGNOSIS — S75.002D: ICD-10-CM

## 2018-03-06 PROCEDURE — 93926 LOWER EXTREMITY STUDY: CPT

## 2018-03-06 PROCEDURE — 99024 POSTOP FOLLOW-UP VISIT: CPT

## 2018-03-26 ENCOUNTER — MEDICATION RENEWAL (OUTPATIENT)
Age: 53
End: 2018-03-26

## 2018-04-17 ENCOUNTER — APPOINTMENT (OUTPATIENT)
Dept: CARDIOLOGY | Facility: CLINIC | Age: 53
End: 2018-04-17

## 2018-04-17 VITALS
HEART RATE: 68 BPM | HEIGHT: 63 IN | SYSTOLIC BLOOD PRESSURE: 90 MMHG | DIASTOLIC BLOOD PRESSURE: 66 MMHG | WEIGHT: 150 LBS | BODY MASS INDEX: 26.58 KG/M2

## 2018-04-17 DIAGNOSIS — J38.3 OTHER DISEASES OF VOCAL CORDS: ICD-10-CM

## 2018-04-17 RX ORDER — FLUTICASONE PROPIONATE 50 UG/1
50 SPRAY, METERED NASAL TWICE DAILY
Qty: 1 | Refills: 3 | Status: DISCONTINUED | COMMUNITY
Start: 2017-11-21 | End: 2018-04-17

## 2018-04-17 RX ORDER — METOPROLOL SUCCINATE 50 MG/1
50 TABLET, EXTENDED RELEASE ORAL DAILY
Qty: 30 | Refills: 5 | Status: DISCONTINUED | COMMUNITY
Start: 2017-08-15 | End: 2018-04-17

## 2018-04-17 RX ORDER — CETIRIZINE HYDROCHLORIDE 5 MG/1
5 TABLET ORAL
Qty: 30 | Refills: 3 | Status: DISCONTINUED | COMMUNITY
Start: 2017-11-21 | End: 2018-04-17

## 2018-04-17 RX ORDER — TICAGRELOR 90 MG/1
90 TABLET ORAL TWICE DAILY
Qty: 60 | Refills: 5 | Status: DISCONTINUED | COMMUNITY
End: 2018-04-17

## 2018-05-14 PROBLEM — I73.9 PVD (PERIPHERAL VASCULAR DISEASE): Status: ACTIVE | Noted: 2018-05-14

## 2018-05-15 ENCOUNTER — APPOINTMENT (OUTPATIENT)
Dept: VASCULAR SURGERY | Facility: CLINIC | Age: 53
End: 2018-05-15
Payer: MEDICAID

## 2018-05-15 DIAGNOSIS — I73.9 PERIPHERAL VASCULAR DISEASE, UNSPECIFIED: ICD-10-CM

## 2018-05-15 PROCEDURE — 99213 OFFICE O/P EST LOW 20 MIN: CPT

## 2018-05-15 PROCEDURE — 93926 LOWER EXTREMITY STUDY: CPT

## 2018-06-11 ENCOUNTER — APPOINTMENT (OUTPATIENT)
Dept: OTOLARYNGOLOGY | Facility: CLINIC | Age: 53
End: 2018-06-11
Payer: MEDICAID

## 2018-06-11 DIAGNOSIS — R49.0 DYSPHONIA: ICD-10-CM

## 2018-06-11 DIAGNOSIS — H61.20 IMPACTED CERUMEN, UNSPECIFIED EAR: ICD-10-CM

## 2018-06-11 DIAGNOSIS — J38.00 PARALYSIS OF VOCAL CORDS AND LARYNX, UNSPECIFIED: ICD-10-CM

## 2018-06-11 PROCEDURE — 31575 DIAGNOSTIC LARYNGOSCOPY: CPT

## 2018-06-11 PROCEDURE — 99203 OFFICE O/P NEW LOW 30 MIN: CPT | Mod: 25

## 2018-06-13 PROBLEM — J38.00 VOCAL CORD PARESIS: Status: ACTIVE | Noted: 2018-06-13

## 2018-06-13 PROBLEM — R49.0 HOARSENESS OF VOICE: Status: ACTIVE | Noted: 2018-06-13

## 2018-06-13 PROBLEM — H61.20 CERUMEN IMPACTION: Status: ACTIVE | Noted: 2018-06-13

## 2018-07-24 ENCOUNTER — APPOINTMENT (OUTPATIENT)
Dept: CARDIOLOGY | Facility: CLINIC | Age: 53
End: 2018-07-24

## 2018-07-24 ENCOUNTER — OUTPATIENT (OUTPATIENT)
Dept: OUTPATIENT SERVICES | Facility: HOSPITAL | Age: 53
LOS: 1 days | Discharge: HOME | End: 2018-07-24

## 2018-07-24 VITALS
WEIGHT: 157 LBS | HEART RATE: 69 BPM | BODY MASS INDEX: 27.82 KG/M2 | HEIGHT: 63 IN | SYSTOLIC BLOOD PRESSURE: 90 MMHG | DIASTOLIC BLOOD PRESSURE: 66 MMHG

## 2018-07-24 DIAGNOSIS — I10 ESSENTIAL (PRIMARY) HYPERTENSION: ICD-10-CM

## 2018-07-24 RX ORDER — LABETALOL HYDROCHLORIDE 200 MG/1
200 TABLET, FILM COATED ORAL
Qty: 60 | Refills: 5 | Status: DISCONTINUED | COMMUNITY
Start: 2018-03-26 | End: 2018-07-24

## 2018-07-24 RX ORDER — HYDRALAZINE HYDROCHLORIDE 50 MG/1
50 TABLET ORAL 3 TIMES DAILY
Qty: 90 | Refills: 5 | Status: DISCONTINUED | COMMUNITY
Start: 2018-03-26 | End: 2018-07-24

## 2018-09-08 ENCOUNTER — INPATIENT (INPATIENT)
Facility: HOSPITAL | Age: 53
LOS: 1 days | Discharge: HOME | End: 2018-09-10
Attending: INTERNAL MEDICINE | Admitting: INTERNAL MEDICINE

## 2018-09-08 VITALS
DIASTOLIC BLOOD PRESSURE: 53 MMHG | TEMPERATURE: 96 F | HEART RATE: 72 BPM | RESPIRATION RATE: 18 BRPM | SYSTOLIC BLOOD PRESSURE: 85 MMHG | OXYGEN SATURATION: 98 %

## 2018-09-08 DIAGNOSIS — Z98.890 OTHER SPECIFIED POSTPROCEDURAL STATES: Chronic | ICD-10-CM

## 2018-09-08 DIAGNOSIS — Z98.62 PERIPHERAL VASCULAR ANGIOPLASTY STATUS: Chronic | ICD-10-CM

## 2018-09-08 DIAGNOSIS — I72.9 ANEURYSM OF UNSPECIFIED SITE: Chronic | ICD-10-CM

## 2018-09-08 LAB
ANION GAP SERPL CALC-SCNC: 19 MMOL/L — HIGH (ref 7–14)
APPEARANCE UR: CLEAR — SIGNIFICANT CHANGE UP
APTT BLD: 30.5 SEC — SIGNIFICANT CHANGE UP (ref 27–39.2)
BASOPHILS # BLD AUTO: 0.06 K/UL — SIGNIFICANT CHANGE UP (ref 0–0.2)
BASOPHILS NFR BLD AUTO: 0.3 % — SIGNIFICANT CHANGE UP (ref 0–1)
BILIRUB UR-MCNC: NEGATIVE — SIGNIFICANT CHANGE UP
BUN SERPL-MCNC: 30 MG/DL — HIGH (ref 10–20)
CALCIUM SERPL-MCNC: 9.8 MG/DL — SIGNIFICANT CHANGE UP (ref 8.5–10.1)
CHLORIDE SERPL-SCNC: 99 MMOL/L — SIGNIFICANT CHANGE UP (ref 98–110)
CK MB CFR SERPL CALC: 1.9 NG/ML — SIGNIFICANT CHANGE UP (ref 0.6–6.3)
CK SERPL-CCNC: 173 U/L — SIGNIFICANT CHANGE UP (ref 0–225)
CO2 SERPL-SCNC: 20 MMOL/L — SIGNIFICANT CHANGE UP (ref 17–32)
COLOR SPEC: YELLOW — SIGNIFICANT CHANGE UP
CREAT SERPL-MCNC: 2.2 MG/DL — HIGH (ref 0.7–1.5)
DIFF PNL FLD: NEGATIVE — SIGNIFICANT CHANGE UP
EOSINOPHIL # BLD AUTO: 0.18 K/UL — SIGNIFICANT CHANGE UP (ref 0–0.7)
EOSINOPHIL NFR BLD AUTO: 0.9 % — SIGNIFICANT CHANGE UP (ref 0–8)
GLUCOSE SERPL-MCNC: 137 MG/DL — HIGH (ref 70–99)
GLUCOSE UR QL: NEGATIVE MG/DL — SIGNIFICANT CHANGE UP
HCG SERPL QL: NEGATIVE — SIGNIFICANT CHANGE UP
HCT VFR BLD CALC: 40.4 % — SIGNIFICANT CHANGE UP (ref 37–47)
HGB BLD-MCNC: 13.2 G/DL — SIGNIFICANT CHANGE UP (ref 12–16)
IMM GRANULOCYTES NFR BLD AUTO: 0.5 % — HIGH (ref 0.1–0.3)
INR BLD: 1.02 RATIO — SIGNIFICANT CHANGE UP (ref 0.65–1.3)
KETONES UR-MCNC: NEGATIVE — SIGNIFICANT CHANGE UP
LEUKOCYTE ESTERASE UR-ACNC: NEGATIVE — SIGNIFICANT CHANGE UP
LYMPHOCYTES # BLD AUTO: 1.92 K/UL — SIGNIFICANT CHANGE UP (ref 1.2–3.4)
LYMPHOCYTES # BLD AUTO: 10 % — LOW (ref 20.5–51.1)
MAGNESIUM SERPL-MCNC: 2 MG/DL — SIGNIFICANT CHANGE UP (ref 1.8–2.4)
MCHC RBC-ENTMCNC: 27.5 PG — SIGNIFICANT CHANGE UP (ref 27–31)
MCHC RBC-ENTMCNC: 32.7 G/DL — SIGNIFICANT CHANGE UP (ref 32–37)
MCV RBC AUTO: 84.2 FL — SIGNIFICANT CHANGE UP (ref 81–99)
MONOCYTES # BLD AUTO: 0.85 K/UL — HIGH (ref 0.1–0.6)
MONOCYTES NFR BLD AUTO: 4.4 % — SIGNIFICANT CHANGE UP (ref 1.7–9.3)
NEUTROPHILS # BLD AUTO: 16.16 K/UL — HIGH (ref 1.4–6.5)
NEUTROPHILS NFR BLD AUTO: 83.9 % — HIGH (ref 42.2–75.2)
NITRITE UR-MCNC: NEGATIVE — SIGNIFICANT CHANGE UP
NRBC # BLD: 0 /100 WBCS — SIGNIFICANT CHANGE UP (ref 0–0)
PH UR: 6 — SIGNIFICANT CHANGE UP (ref 5–8)
PLATELET # BLD AUTO: 302 K/UL — SIGNIFICANT CHANGE UP (ref 130–400)
POTASSIUM SERPL-MCNC: 5.2 MMOL/L — HIGH (ref 3.5–5)
POTASSIUM SERPL-SCNC: 5.2 MMOL/L — HIGH (ref 3.5–5)
PROT UR-MCNC: NEGATIVE MG/DL — SIGNIFICANT CHANGE UP
PROTHROM AB SERPL-ACNC: 11 SEC — SIGNIFICANT CHANGE UP (ref 9.95–12.87)
RBC # BLD: 4.8 M/UL — SIGNIFICANT CHANGE UP (ref 4.2–5.4)
RBC # FLD: 14 % — SIGNIFICANT CHANGE UP (ref 11.5–14.5)
SODIUM SERPL-SCNC: 138 MMOL/L — SIGNIFICANT CHANGE UP (ref 135–146)
SP GR SPEC: 1.01 — SIGNIFICANT CHANGE UP (ref 1.01–1.03)
TROPONIN T SERPL-MCNC: <0.01 NG/ML — SIGNIFICANT CHANGE UP
UROBILINOGEN FLD QL: 0.2 MG/DL — SIGNIFICANT CHANGE UP (ref 0.2–0.2)
WBC # BLD: 19.26 K/UL — HIGH (ref 4.8–10.8)
WBC # FLD AUTO: 19.26 K/UL — HIGH (ref 4.8–10.8)

## 2018-09-08 RX ORDER — HYDRALAZINE HCL 50 MG
50 TABLET ORAL EVERY 8 HOURS
Qty: 0 | Refills: 0 | Status: DISCONTINUED | OUTPATIENT
Start: 2018-09-08 | End: 2018-09-08

## 2018-09-08 RX ORDER — ATORVASTATIN CALCIUM 80 MG/1
80 TABLET, FILM COATED ORAL AT BEDTIME
Qty: 0 | Refills: 0 | Status: DISCONTINUED | OUTPATIENT
Start: 2018-09-08 | End: 2018-09-10

## 2018-09-08 RX ORDER — ASPIRIN/CALCIUM CARB/MAGNESIUM 324 MG
81 TABLET ORAL DAILY
Qty: 0 | Refills: 0 | Status: DISCONTINUED | OUTPATIENT
Start: 2018-09-08 | End: 2018-09-10

## 2018-09-08 RX ORDER — LISINOPRIL 2.5 MG/1
20 TABLET ORAL DAILY
Qty: 0 | Refills: 0 | Status: DISCONTINUED | OUTPATIENT
Start: 2018-09-08 | End: 2018-09-10

## 2018-09-08 RX ORDER — LISINOPRIL 2.5 MG/1
1 TABLET ORAL
Qty: 0 | Refills: 0 | COMMUNITY

## 2018-09-08 RX ORDER — HEPARIN SODIUM 5000 [USP'U]/ML
5000 INJECTION INTRAVENOUS; SUBCUTANEOUS EVERY 8 HOURS
Qty: 0 | Refills: 0 | Status: DISCONTINUED | OUTPATIENT
Start: 2018-09-08 | End: 2018-09-10

## 2018-09-08 RX ORDER — AMLODIPINE BESYLATE 2.5 MG/1
5 TABLET ORAL DAILY
Qty: 0 | Refills: 0 | Status: DISCONTINUED | OUTPATIENT
Start: 2018-09-08 | End: 2018-09-10

## 2018-09-08 RX ORDER — ATORVASTATIN CALCIUM 80 MG/1
1 TABLET, FILM COATED ORAL
Qty: 0 | Refills: 0 | COMMUNITY

## 2018-09-08 RX ORDER — SODIUM CHLORIDE 9 MG/ML
1000 INJECTION INTRAMUSCULAR; INTRAVENOUS; SUBCUTANEOUS ONCE
Qty: 0 | Refills: 0 | Status: COMPLETED | OUTPATIENT
Start: 2018-09-08 | End: 2018-09-08

## 2018-09-08 RX ORDER — ASPIRIN/CALCIUM CARB/MAGNESIUM 324 MG
1 TABLET ORAL
Qty: 0 | Refills: 0 | COMMUNITY

## 2018-09-08 RX ORDER — AMLODIPINE BESYLATE 2.5 MG/1
1 TABLET ORAL
Qty: 0 | Refills: 0 | COMMUNITY

## 2018-09-08 RX ORDER — LABETALOL HCL 100 MG
200 TABLET ORAL EVERY 12 HOURS
Qty: 0 | Refills: 0 | Status: DISCONTINUED | OUTPATIENT
Start: 2018-09-08 | End: 2018-09-10

## 2018-09-08 RX ORDER — SODIUM CHLORIDE 9 MG/ML
1000 INJECTION, SOLUTION INTRAVENOUS
Qty: 0 | Refills: 0 | Status: DISCONTINUED | OUTPATIENT
Start: 2018-09-08 | End: 2018-09-08

## 2018-09-08 RX ADMIN — SODIUM CHLORIDE 500 MILLILITER(S): 9 INJECTION INTRAMUSCULAR; INTRAVENOUS; SUBCUTANEOUS at 20:17

## 2018-09-08 RX ADMIN — SODIUM CHLORIDE 1000 MILLILITER(S): 9 INJECTION INTRAMUSCULAR; INTRAVENOUS; SUBCUTANEOUS at 18:37

## 2018-09-08 NOTE — H&P ADULT - ATTENDING COMMENTS
53 yr F HTN , DLD  , HFpEf ,TIA (2016)CAD s/p PCI in 10/17 complicated by pseudoaneurysm s/p repair in 11/17 ,Left leg  ileofemoral by pass in 2/18 ,complicated by staph bacteremia requiring IV Nafcillin  ,DIONICIO on CKD requiring approx 5 sessions of H,D in 12/18 (PIGN on kidney biopsy) presented with  witnessed syncope .    As per pt ,she was having severe neck pain for which she took Aleve today in the afternoon .  around this afternoon, she was at her sister house, suddenly started having numbness of the back of the tongue, followed by sweating, remembers walking towards the table to get food, woke up on the floor, no seizure like activity, no tongue bite, no incontinence, patient has been asymptomatic since recovered from syncope. Patient denies any symptoms in ED. denies any fever, chills ,headache ,neck pain, chest pain ,palpitation sob, abd pain, nausea and vomiting .or diarrhoea any URI symptoms,       REVIEW OF SYSTEMS: see cc/HPI  CONSTITUTIONAL: No weakness, fevers or chills  EYES/ENT: No visual changes;  No vertigo or throat pain   NECK: No pain or stiffness  RESPIRATORY: No cough, wheezing, hemoptysis; No shortness of breath  CARDIOVASCULAR: No chest pain or palpitations  GASTROINTESTINAL: No abdominal or epigastric pain. No nausea, vomiting, or hematemesis; No diarrhea or constipation. No melena or hematochezia.  GENITOURINARY: No dysuria, frequency or hematuria  NEUROLOGICAL: No numbness or weakness, (+) LOC / fall  SKIN: No itching, rashes    Physical Exam:  General: WN/WD NAD  Neurology: A&Ox3, nonfocal, follows commands  Eyes: PERRLA/ EOMI  ENT/Neck: Neck supple, trachea midline, No JVD  Respiratory: CTA B/L, No wheezing, rales, rhonchi  CV: Normal rate regular rhythm, S1S2, no murmurs, rubs or gallops  Abdominal: Soft, NT, ND +BS,   Extremities: No edema, + peripheral pulses  Skin: No Rashes, Hematoma, Ecchymosis  Incisions: n/a  Tubes:n/a    A/P   Syncope r/o dehydration   -Admit to tele  -IV fluids and repeat orthostatic vitals after fluids  - 2D echo    CAD/HTN/Dyslipidemia/HF  - c/w current outpatient Rx    Thyroid nodule   -outpatient referral for ENDO for full eval    Leukocytosis   -repeat WBC  -check Blood Cx and UCx    CKD - stable    DVT prophylaxis

## 2018-09-08 NOTE — ED ADULT NURSE NOTE - NSIMPLEMENTINTERV_GEN_ALL_ED
Implemented All Fall with Harm Risk Interventions:  [place] to call system. Call bell, personal items and telephone within reach. Instruct patient to call for assistance. Room bathroom lighting operational. Non-slip footwear when patient is off stretcher. Physically safe environment: no spills, clutter or unnecessary equipment. Stretcher in lowest position, wheels locked, appropriate side rails in place. Provide visual cue, wrist band, yellow gown, etc. Monitor gait and stability. Monitor for mental status changes and reorient to person, place, and time. Review medications for side effects contributing to fall risk. Reinforce activity limits and safety measures with patient and family. Provide visual clues: red socks.

## 2018-09-08 NOTE — H&P ADULT - PMH
CAD (coronary artery disease)    CHF (congestive heart failure)    CKD (chronic kidney disease)    Dyslipidemia    HTN (hypertension)    Pseudoaneurysm    TIA (transient ischemic attack)

## 2018-09-08 NOTE — ED ADULT NURSE REASSESSMENT NOTE - NS ED NURSE REASSESS COMMENT FT1
Pts c-collar removed by MD Steele, CT negative. Pending UA, pt aware. Pt on monitor at this time. Will continue to monitor.

## 2018-09-08 NOTE — H&P ADULT - ASSESSMENT
53 yr F HTN , DLD  , HF with pEF ,TIA (2016)CAD s/p PCI in 10/17 complicated by pseudoaneurysm s/p repair in 11/17 ,Left leg  ileofemoral by pass in 2/18 ,complicated by staph bacteremia requiring IV Nafcillin  ,DIONICIO on CKD requiring approx 5 sessions of H,D in 12/18 (PIGN on kidney biopsy) presented with  witnessed syncope .    #syncope;  likely 2/2 orthostatic hypotension  s/p 2 L IVF   repeat orthostatic vitals   hold vasodilators (hydralazine) for now    CT head ,EKG ,first set of C.E within normal range  cw tele monitoring for now     #CAD/HTN/DLD/HF;  cw Amlodipine, lipitor, labetalol , lisinopril and aspirin   last echo 1/22/18 showing EF 60% with mild to mod MR     #thyroid nodule on ct scan;  follow TSH     # high WBCs;  UA, chest  ray negative   less likely from any infection  follow CBC in am    #CKD-4 ;  stable    #DVT ppx; sub Q heparin    #DIet; DASH and renal    #ambulate as tolerated    #FULL CODE     #Dispo; within 24 hr after medical optimization

## 2018-09-08 NOTE — ED ADULT NURSE NOTE - OBJECTIVE STATEMENT
pt was at sisters house when she had a witnessed syncopal episode. pt states that she did not hit her head. she landed on the floor. bruise to right hand pt states has been there since blood work last month. denies neck pain. c-collar in place.  at bedside. was not there for episode.

## 2018-09-08 NOTE — H&P ADULT - NSHPSOCIALHISTORY_GEN_ALL_CORE
lives at home with    active smoker (5 cig/day ) since age of 16  denies ETOH and any other drug abuse

## 2018-09-08 NOTE — ED PROVIDER NOTE - MEDICAL DECISION MAKING DETAILS
patient improved well, diagnostic study results noted, is admitted to [facility] for further care.

## 2018-09-08 NOTE — ED ADULT NURSE NOTE - CHIEF COMPLAINT QUOTE
syncope while ambulating In home. reports diaphoresis and tongue numbness prior to episode. +LOC. denies cp. reports upper lip swelling

## 2018-09-08 NOTE — H&P ADULT - NSHPLABSRESULTS_GEN_ALL_CORE
13.2   19.26 )-----------( 302      ( 08 Sep 2018 18:20 )             40.4   09-08    138  |  99  |  30<H>  ----------------------------<  137<H>  5.2<H>   |  20  |  2.2<H>    Ca    9.8      08 Sep 2018 18:20  Mg     2.0     09-08    PT/INR - ( 08 Sep 2018 18:20 )   PT: 11.00 sec;   INR: 1.02 ratio         PTT - ( 08 Sep 2018 18:20 )  PTT:30.5 sec  CARDIAC MARKERS ( 08 Sep 2018 18:20 )  x     / <0.01 ng/mL / 173 U/L / x     / 1.9 ng/mL    Urinalysis (09.08.18 @ 20:04)    Glucose Qualitative, Urine: Negative mg/dL    Blood, Urine: Negative    pH Urine: 6.0    Color: Yellow    Urine Appearance: Clear    Bilirubin: Negative    Ketone - Urine: Negative    Specific Gravity: 1.010    Protein, Urine: Negative mg/dL    Urobilinogen: 0.2 mg/dL    Nitrite: Negative    Leukocyte Esterase Concentration: Negative      < from: CT Cervical Spine No Cont (09.08.18 @ 18:54) >      IMPRESSION:     1.  No evidence of acute cervical spine fracture or dislocation.    2.  Degenerative changes of the cervical spine worse at C4-5 and C5-6 as   described above.    3.  Straightening of the normal cervical lordosis, with mild   anterolisthesis of C2 slightly anterior on C3.     4.  Multiple left thyroid lobe hypodense nodules, largest measuring   approximately 1.9 cm. Nonemergent outpatient thyroid ultrasound may be   obtained for further evaluation.            < end of copied text >    < from: CT Head No Cont (09.08.18 @ 18:54) >    IMPRESSION:     1.  No CT evidence of acute intracranial pathology. Stable exam since   10/19/2017.    2.  Stable right thalamic/internal capsule chronic lacunar infarct.    3.  If the patient continues to be symptomatic follow-up MRI of the brain   may be helpful for further evaluation.    < end of copied text >

## 2018-09-08 NOTE — ED PROVIDER NOTE - CARE PLAN
Principal Discharge DX:	Syncope and collapse  Secondary Diagnosis:	Hypotension, unspecified hypotension type  Secondary Diagnosis:	Leukocytosis, unspecified type

## 2018-09-08 NOTE — H&P ADULT - FAMILY HISTORY
Father  Still living? Yes, Estimated age: Age Unknown  Family history of alcohol abuse and dependence, Age at diagnosis: Age Unknown     Mother  Still living? Unknown  Family history of coronary artery disease, Age at diagnosis: Age Unknown

## 2018-09-08 NOTE — ED ADULT TRIAGE NOTE - CHIEF COMPLAINT QUOTE
syncope while ambulating In home. reports diaphoresis and tongue numbness prior to episode. +LOC. syncope while ambulating In home. reports diaphoresis and tongue numbness prior to episode. +LOC. denies cp. reports upper lip swelling

## 2018-09-08 NOTE — ED PROVIDER NOTE - OBJECTIVE STATEMENT
patient states that was doing fine until this afternoon, was at her sister house, suddenly started having numbness of the back of the tongue, followed by sweating, remembers walking towards the table to get food, woke up on the floor, no seizure like activity, no tongue bite, no incontinence, patient has been asymptomatic since recovered from syncope. Patient denies any symptoms in ED. denies any HA/neck pain/cp/sob/abd pain. Denies any f/c/URI symptoms, no diarrhea, no vomiting. Patient with h/o HTN, CAD, s/p angioplasty, s/p complicated lower ext arterial problems, s/p repair, dyslipedemia, CKD, has not been to PMD recently, prefers to see Dr. Mckeon at our clinic.

## 2018-09-08 NOTE — H&P ADULT - NSHPPHYSICALEXAM_GEN_ALL_CORE
Vital Signs Last 24 Hrs  T(C): 35.6 (08 Sep 2018 17:09), Max: 35.6 (08 Sep 2018 17:09)  T(F): 96 (08 Sep 2018 17:09), Max: 96 (08 Sep 2018 17:09)  HR: 67 (08 Sep 2018 18:42) (63 - 72)  BP: 116/77 (08 Sep 2018 18:37) (85/53 - 116/77)  BP(mean): --  RR: 18 (08 Sep 2018 17:09) (18 - 18)  SpO2: 98% (08 Sep 2018 17:09) (98% - 98%)    orthostatic bp ; 116/77 lying and 104/65 standing     PHYSICAL EXAM:  Constitutional:no distress    Eyes: no pallor or icterus    ENMT: within normal range     Neck: no JVD     Respiratory: VB+O    Cardiovascular: S1 +S2+0    Gastrointestinal: soft ,non tender ,BS + ,no HS megaly     Extremities: no LE edema     Neurological: AAO*3   non focal

## 2018-09-08 NOTE — H&P ADULT - HISTORY OF PRESENT ILLNESS
53 yr F HTN , DLD  , CHF ,TIA (2016)CAD s/p PCI in 10/17 complicated by pseudoaneurysm s/p repair in 11/17 ,Left leg  ileofemoral by pass in 2/18 ,complicated by staph bacteremia requiring IV Nafcillin  ,DIONICIO on CKD requiring approx 5 sessions of H,D in 12/18 (PIGN on kidney biopsy) presented with  witnessed syncope .    As per pt ,she was having severe neck pain for which she took Aleve today in the afternoon .  around this afternoon, she was at her sister house, suddenly started having numbness of the back of the tongue, followed by sweating, remembers walking towards the table to get food, woke up on the floor, no seizure like activity, no tongue bite, no incontinence, patient has been asymptomatic since recovered from syncope. Patient denies any symptoms in ED. denies any fever, chills ,headache ,neck pain, chest pain ,palpitation sob, abd pain, nausea and vomiting .or diarrhoea any URI symptoms,     in ER,  was hypotensive in 85/53 and improves to 116/77 without any intervention  found to have Nl EKG but post alonzo orthostatics vitals   s/p 2 L IVF   CT scan and CT cervical spine doesn't showed any pathology 53 yr F HTN , DLD  , HFpEf ,TIA (2016)CAD s/p PCI in 10/17 complicated by pseudoaneurysm s/p repair in 11/17 ,Left leg  ileofemoral by pass in 2/18 ,complicated by staph bacteremia requiring IV Nafcillin  ,DIONICIO on CKD requiring approx 5 sessions of H,D in 12/18 (PIGN on kidney biopsy) presented with  witnessed syncope .    As per pt ,she was having severe neck pain for which she took Aleve today in the afternoon .  around this afternoon, she was at her sister house, suddenly started having numbness of the back of the tongue, followed by sweating, remembers walking towards the table to get food, woke up on the floor, no seizure like activity, no tongue bite, no incontinence, patient has been asymptomatic since recovered from syncope. Patient denies any symptoms in ED. denies any fever, chills ,headache ,neck pain, chest pain ,palpitation sob, abd pain, nausea and vomiting .or diarrhoea any URI symptoms,     in ER,  was hypotensive in 85/53 and improves to 116/77 without any intervention  found to have Nl EKG but post alonzo orthostatics vitals   s/p 2 L IVF   CT scan and CT cervical spine doesn't showed any pathology

## 2018-09-09 LAB
ALBUMIN SERPL ELPH-MCNC: 3.8 G/DL — SIGNIFICANT CHANGE UP (ref 3.5–5.2)
ALP SERPL-CCNC: 66 U/L — SIGNIFICANT CHANGE UP (ref 30–115)
ALT FLD-CCNC: 18 U/L — SIGNIFICANT CHANGE UP (ref 0–41)
ANION GAP SERPL CALC-SCNC: 16 MMOL/L — HIGH (ref 7–14)
AST SERPL-CCNC: 17 U/L — SIGNIFICANT CHANGE UP (ref 0–41)
BASOPHILS # BLD AUTO: 0.04 K/UL — SIGNIFICANT CHANGE UP (ref 0–0.2)
BASOPHILS NFR BLD AUTO: 0.5 % — SIGNIFICANT CHANGE UP (ref 0–1)
BILIRUB SERPL-MCNC: 0.3 MG/DL — SIGNIFICANT CHANGE UP (ref 0.2–1.2)
BUN SERPL-MCNC: 27 MG/DL — HIGH (ref 10–20)
CALCIUM SERPL-MCNC: 9.4 MG/DL — SIGNIFICANT CHANGE UP (ref 8.5–10.1)
CHLORIDE SERPL-SCNC: 107 MMOL/L — SIGNIFICANT CHANGE UP (ref 98–110)
CK MB CFR SERPL CALC: 2.2 NG/ML — SIGNIFICANT CHANGE UP (ref 0.6–6.3)
CK MB CFR SERPL CALC: 2.3 NG/ML — SIGNIFICANT CHANGE UP (ref 0.6–6.3)
CK SERPL-CCNC: 130 U/L — SIGNIFICANT CHANGE UP (ref 0–225)
CO2 SERPL-SCNC: 21 MMOL/L — SIGNIFICANT CHANGE UP (ref 17–32)
CREAT SERPL-MCNC: 1.7 MG/DL — HIGH (ref 0.7–1.5)
EOSINOPHIL # BLD AUTO: 0.35 K/UL — SIGNIFICANT CHANGE UP (ref 0–0.7)
EOSINOPHIL NFR BLD AUTO: 4.3 % — SIGNIFICANT CHANGE UP (ref 0–8)
GLUCOSE SERPL-MCNC: 83 MG/DL — SIGNIFICANT CHANGE UP (ref 70–99)
HCT VFR BLD CALC: 32.4 % — LOW (ref 37–47)
HGB BLD-MCNC: 10.7 G/DL — LOW (ref 12–16)
IMM GRANULOCYTES NFR BLD AUTO: 0.2 % — SIGNIFICANT CHANGE UP (ref 0.1–0.3)
LYMPHOCYTES # BLD AUTO: 1.65 K/UL — SIGNIFICANT CHANGE UP (ref 1.2–3.4)
LYMPHOCYTES # BLD AUTO: 20.3 % — LOW (ref 20.5–51.1)
MAGNESIUM SERPL-MCNC: 2 MG/DL — SIGNIFICANT CHANGE UP (ref 1.8–2.4)
MCHC RBC-ENTMCNC: 27.4 PG — SIGNIFICANT CHANGE UP (ref 27–31)
MCHC RBC-ENTMCNC: 33 G/DL — SIGNIFICANT CHANGE UP (ref 32–37)
MCV RBC AUTO: 83.1 FL — SIGNIFICANT CHANGE UP (ref 81–99)
MONOCYTES # BLD AUTO: 0.4 K/UL — SIGNIFICANT CHANGE UP (ref 0.1–0.6)
MONOCYTES NFR BLD AUTO: 4.9 % — SIGNIFICANT CHANGE UP (ref 1.7–9.3)
NEUTROPHILS # BLD AUTO: 5.68 K/UL — SIGNIFICANT CHANGE UP (ref 1.4–6.5)
NEUTROPHILS NFR BLD AUTO: 69.8 % — SIGNIFICANT CHANGE UP (ref 42.2–75.2)
PLATELET # BLD AUTO: 222 K/UL — SIGNIFICANT CHANGE UP (ref 130–400)
POTASSIUM SERPL-MCNC: 4.7 MMOL/L — SIGNIFICANT CHANGE UP (ref 3.5–5)
POTASSIUM SERPL-SCNC: 4.7 MMOL/L — SIGNIFICANT CHANGE UP (ref 3.5–5)
PROT SERPL-MCNC: 6.9 G/DL — SIGNIFICANT CHANGE UP (ref 6–8)
RBC # BLD: 3.9 M/UL — LOW (ref 4.2–5.4)
RBC # FLD: 13.9 % — SIGNIFICANT CHANGE UP (ref 11.5–14.5)
SODIUM SERPL-SCNC: 144 MMOL/L — SIGNIFICANT CHANGE UP (ref 135–146)
TROPONIN T SERPL-MCNC: <0.01 NG/ML — SIGNIFICANT CHANGE UP
TROPONIN T SERPL-MCNC: <0.01 NG/ML — SIGNIFICANT CHANGE UP
WBC # BLD: 8.14 K/UL — SIGNIFICANT CHANGE UP (ref 4.8–10.8)
WBC # FLD AUTO: 8.14 K/UL — SIGNIFICANT CHANGE UP (ref 4.8–10.8)

## 2018-09-09 RX ADMIN — Medication 81 MILLIGRAM(S): at 11:38

## 2018-09-09 RX ADMIN — ATORVASTATIN CALCIUM 80 MILLIGRAM(S): 80 TABLET, FILM COATED ORAL at 23:24

## 2018-09-09 RX ADMIN — AMLODIPINE BESYLATE 5 MILLIGRAM(S): 2.5 TABLET ORAL at 08:40

## 2018-09-09 RX ADMIN — LISINOPRIL 20 MILLIGRAM(S): 2.5 TABLET ORAL at 08:40

## 2018-09-09 NOTE — PROVIDER CONTACT NOTE (MEDICATION) - SITUATION
Patients /67 HR 58 pt due for lisinopril 20 mg amlodipine 5mg and hydralazine 200mg as per MD hold hydralazine 200mg and give amlodipine and lisinopril as ordered Patients /67 HR 58 pt due for lisinopril 20 mg amlodipine 5mg and labetalol 200mg as per MD hold labetalol 200mg and give amlodipine and lisinopril as ordered

## 2018-09-09 NOTE — ED ADULT NURSE REASSESSMENT NOTE - NS ED NURSE REASSESS COMMENT FT1
Pts B/P currently 103/82 HR 79, as per MD alvares hold medication until after breakfast and reassess B/P. Will notify RN during report to reassess.

## 2018-09-10 ENCOUNTER — TRANSCRIPTION ENCOUNTER (OUTPATIENT)
Age: 53
End: 2018-09-10

## 2018-09-10 VITALS — WEIGHT: 154.98 LBS

## 2018-09-10 LAB
ANION GAP SERPL CALC-SCNC: 15 MMOL/L — HIGH (ref 7–14)
BASOPHILS # BLD AUTO: 0.05 K/UL — SIGNIFICANT CHANGE UP (ref 0–0.2)
BASOPHILS NFR BLD AUTO: 0.7 % — SIGNIFICANT CHANGE UP (ref 0–1)
BUN SERPL-MCNC: 28 MG/DL — HIGH (ref 10–20)
CALCIUM SERPL-MCNC: 9.6 MG/DL — SIGNIFICANT CHANGE UP (ref 8.5–10.1)
CHLORIDE SERPL-SCNC: 104 MMOL/L — SIGNIFICANT CHANGE UP (ref 98–110)
CO2 SERPL-SCNC: 22 MMOL/L — SIGNIFICANT CHANGE UP (ref 17–32)
CREAT SERPL-MCNC: 1.7 MG/DL — HIGH (ref 0.7–1.5)
CULTURE RESULTS: SIGNIFICANT CHANGE UP
EOSINOPHIL # BLD AUTO: 0.36 K/UL — SIGNIFICANT CHANGE UP (ref 0–0.7)
EOSINOPHIL NFR BLD AUTO: 5.3 % — SIGNIFICANT CHANGE UP (ref 0–8)
GLUCOSE SERPL-MCNC: 85 MG/DL — SIGNIFICANT CHANGE UP (ref 70–99)
HCT VFR BLD CALC: 31.7 % — LOW (ref 37–47)
HGB BLD-MCNC: 10.4 G/DL — LOW (ref 12–16)
IMM GRANULOCYTES NFR BLD AUTO: 0.4 % — HIGH (ref 0.1–0.3)
LYMPHOCYTES # BLD AUTO: 1.86 K/UL — SIGNIFICANT CHANGE UP (ref 1.2–3.4)
LYMPHOCYTES # BLD AUTO: 27.5 % — SIGNIFICANT CHANGE UP (ref 20.5–51.1)
MAGNESIUM SERPL-MCNC: 2 MG/DL — SIGNIFICANT CHANGE UP (ref 1.8–2.4)
MCHC RBC-ENTMCNC: 27.4 PG — SIGNIFICANT CHANGE UP (ref 27–31)
MCHC RBC-ENTMCNC: 32.8 G/DL — SIGNIFICANT CHANGE UP (ref 32–37)
MCV RBC AUTO: 83.6 FL — SIGNIFICANT CHANGE UP (ref 81–99)
MONOCYTES # BLD AUTO: 0.44 K/UL — SIGNIFICANT CHANGE UP (ref 0.1–0.6)
MONOCYTES NFR BLD AUTO: 6.5 % — SIGNIFICANT CHANGE UP (ref 1.7–9.3)
NEUTROPHILS # BLD AUTO: 4.03 K/UL — SIGNIFICANT CHANGE UP (ref 1.4–6.5)
NEUTROPHILS NFR BLD AUTO: 59.6 % — SIGNIFICANT CHANGE UP (ref 42.2–75.2)
NRBC # BLD: 0 /100 WBCS — SIGNIFICANT CHANGE UP (ref 0–0)
PLATELET # BLD AUTO: 226 K/UL — SIGNIFICANT CHANGE UP (ref 130–400)
POTASSIUM SERPL-MCNC: 5 MMOL/L — SIGNIFICANT CHANGE UP (ref 3.5–5)
POTASSIUM SERPL-SCNC: 5 MMOL/L — SIGNIFICANT CHANGE UP (ref 3.5–5)
RBC # BLD: 3.79 M/UL — LOW (ref 4.2–5.4)
RBC # FLD: 13.8 % — SIGNIFICANT CHANGE UP (ref 11.5–14.5)
SODIUM SERPL-SCNC: 141 MMOL/L — SIGNIFICANT CHANGE UP (ref 135–146)
SPECIMEN SOURCE: SIGNIFICANT CHANGE UP
T4 AB SER-ACNC: 6.7 UG/DL — SIGNIFICANT CHANGE UP (ref 4.6–12)
TSH SERPL-MCNC: 0.7 UIU/ML — SIGNIFICANT CHANGE UP (ref 0.27–4.2)
WBC # BLD: 6.77 K/UL — SIGNIFICANT CHANGE UP (ref 4.8–10.8)
WBC # FLD AUTO: 6.77 K/UL — SIGNIFICANT CHANGE UP (ref 4.8–10.8)

## 2018-09-10 RX ADMIN — Medication 81 MILLIGRAM(S): at 11:38

## 2018-09-10 RX ADMIN — LISINOPRIL 20 MILLIGRAM(S): 2.5 TABLET ORAL at 06:53

## 2018-09-10 NOTE — DISCHARGE NOTE ADULT - PLAN OF CARE
Prevention Low BP on admisson, improved with fluids. Stop hydralazine. EKG and telemetry no events, cardiac enzymes negative.   Follow up with primary care within 1 week to check BP, discuss restarting hydralazine  If symptoms recur please return to ED Management Outpatient ultrasound of thyroid nodule seen on CT scan: "Multiple left thyroid lobe hypodense nodules, largest measuring approximately 1.9 cm"

## 2018-09-10 NOTE — DISCHARGE NOTE ADULT - MEDICATION SUMMARY - MEDICATIONS TO TAKE
I will START or STAY ON the medications listed below when I get home from the hospital:    aspirin 81 mg oral tablet, chewable  -- 1 tab(s) by mouth once a day  -- Indication: For CAD (coronary artery disease)    lisinopril 20 mg oral tablet  -- 1 tab(s) by mouth once a day  -- Indication: For HTN (hypertension)    atorvastatin 80 mg oral tablet  -- 1 tab(s) by mouth once a day  -- Indication: For CAD (coronary artery disease)    labetalol 200 mg oral tablet  -- 1 tab(s) by mouth every 12 hours  -- Indication: For HTN (hypertension)    amLODIPine 5 mg oral tablet  -- 1 tab(s) by mouth once a day  -- Indication: For HTN (hypertension)

## 2018-09-10 NOTE — DISCHARGE NOTE ADULT - PATIENT PORTAL LINK FT
You can access the [email or web address] Portal, offered by [facility], by registering with the following website: [email or web address]

## 2018-09-10 NOTE — PROGRESS NOTE ADULT - SUBJECTIVE AND OBJECTIVE BOX
YEMI WILLI  53y  [facility] ER [address]      Patient is a 53y old  Female who presents with a chief complaint of syncope (09 Sep 2018 17:27)      INTERVAL HPI/OVERNIGHT EVENTS:  no acute events overnight       REVIEW OF SYSTEMS:  CONSTITUTIONAL: No fever, weight loss, or fatigue  EYES: No eye pain, visual disturbances, or discharge  ENMT:  No difficulty hearing, tinnitus, vertigo; No sinus or throat pain  NECK: No pain or stiffness  BREASTS: No pain, masses, or nipple discharge  RESPIRATORY: No cough, wheezing, chills or hemoptysis; No shortness of breath  CARDIOVASCULAR: No chest pain, palpitations, dizziness, or leg swelling  GASTROINTESTINAL: No abdominal or epigastric pain. No nausea, vomiting, or hematemesis; No diarrhea or constipation. No melena or hematochezia.  GENITOURINARY: No dysuria, frequency, hematuria, or incontinence  NEUROLOGICAL: No headaches, memory loss, loss of strength, numbness, or tremors  SKIN: No itching, burning, rashes, or lesions   LYMPH NODES: No enlarged glands  ENDOCRINE: No heat or cold intolerance; No hair loss  MUSCULOSKELETAL: No joint pain or swelling; No muscle, back, or extremity pain  PSYCHIATRIC: No depression, anxiety, mood swings, or difficulty sleeping  HEME/LYMPH: No easy bruising, or bleeding gums  ALLERY AND IMMUNOLOGIC: No hives or eczema  FAMILY HISTORY:  Family history of coronary artery disease (Mother)  Family history of alcohol abuse and dependence (Father)    T(C): 36.7 (09-10-18 @ 07:36), Max: 37 (09-09-18 @ 22:49)  HR: 60 (09-10-18 @ 07:36) (56 - 89)  BP: 111/80 (09-10-18 @ 07:36) (105/59 - 128/63)  RR: 18 (09-10-18 @ 07:36) (18 - 18)  SpO2: 100% (09-10-18 @ 07:36) (100% - 100%)  Wt(kg): --Vital Signs Last 24 Hrs  T(C): 36.7 (10 Sep 2018 07:36), Max: 37 (09 Sep 2018 22:49)  T(F): 98 (10 Sep 2018 07:36), Max: 98.6 (09 Sep 2018 22:49)  HR: 60 (10 Sep 2018 07:36) (56 - 89)  BP: 111/80 (10 Sep 2018 07:36) (105/59 - 128/63)  BP(mean): --  RR: 18 (10 Sep 2018 07:36) (18 - 18)  SpO2: 100% (10 Sep 2018 07:36) (100% - 100%)    PHYSICAL EXAM:  GENERAL: NAD, well-groomed, well-developed  HEAD:  Atraumatic, Normocephalic  EYES: EOMI, PERRLA, conjunctiva and sclera clear  ENMT: No tonsillar erythema, exudates, or enlargement; Moist mucous membranes, Good dentition, No lesions  NECK: Supple, No JVD, Normal thyroid  NERVOUS SYSTEM:  Alert & Oriented X3, Good concentration; Motor Strength 5/5 B/L upper and lower extremities; DTRs 2+ intact and symmetric  PULM: Clear to auscultation bilaterally  CARDIAC: Regular rate and rhythm; No murmurs, rubs, or gallops  GI: Soft, Nontender, Nondistended; Bowel sounds present  EXTREMITIES:  2+ Peripheral Pulses, No clubbing, cyanosis, or edema  LYMPH: No lymphadenopathy noted  SKIN: No rashes or lesions    Consultant(s) Notes Reviewed:  [x ] YES  [ ] NO  Care Discussed with Consultants/Other Providers [ x] YES  [ ] NO    LABS:                            10.4   6.77  )-----------( 226      ( 10 Sep 2018 06:45 )             31.7   09-10    141  |  104  |  28<H>  ----------------------------<  85  5.0   |  22  |  1.7<H>    Ca    9.6      10 Sep 2018 06:45  Mg     2.0     09-10    TPro  6.9  /  Alb  3.8  /  TBili  0.3  /  DBili  x   /  AST  17  /  ALT  18  /  AlkPhos  66  09-09            amLODIPine   Tablet 5 milliGRAM(s) Oral daily  aspirin  chewable 81 milliGRAM(s) Oral daily  atorvastatin 80 milliGRAM(s) Oral at bedtime  heparin  Injectable 5000 Unit(s) SubCutaneous every 8 hours  labetalol 200 milliGRAM(s) Oral every 12 hours  lisinopril 20 milliGRAM(s) Oral daily      HEALTH ISSUES - PROBLEM Dx:          Case Discussed with House Staff   45 minutes spent on total encounter; more than 50% of the visit was spent counseling and/or coordinating care by the attending physician.     53 yr F HTN , DLD  , HF with pEF ,TIA (2016)CAD s/p PCI in 10/17 complicated by pseudoaneurysm s/p repair in 11/17 ,Left leg  ileofemoral by pass in 2/18 ,complicated by staph bacteremia requiring IV Nafcillin  ,DIONICIO on CKD requiring approx 5 sessions of H,D in 12/18 (PIGN on kidney biopsy) presented with  witnessed syncope .    Syncope: likely orthostatic, received 2L IVF, orthostatics negative after; CTH neg,   -no events on telemetry  -echocardiogram performed awaiting final results     CAD/HTN/DLD/HF; stable, CE neg x2, no EKG changes  - 2d echo  - C/w current meds    Thyroid nodule on ct scan;  -outpatient follow up     Hyperkalemia   -low K diet , repeat in 1 week    CKD 3 at baseline monitor    Chronic anemia monitor hg      Leukocytosis: resolved; UA & CXR neg  - Repeat CBC    CKD4: stable    dw patient at length - if echocardiogram negative can dc home to follow up with cardiology outpatient
24H events:    No events  Feeling well    PAST MEDICAL & SURGICAL HISTORY  CKD (chronic kidney disease)  Pseudoaneurysm  TIA (transient ischemic attack)  Dyslipidemia  CHF (congestive heart failure)  CAD (coronary artery disease)  HTN (hypertension)  History of peripheral artery bypass  Anastomotic arterial aneurysm  H/O angioplasty    SOCIAL HISTORY:  Negative for smoking/alcohol/drug use.     ALLERGIES:  ciprofloxacin (Rash)  Keflex (Unknown)    MEDICATIONS:  STANDING MEDICATIONS  amLODIPine   Tablet 5 milliGRAM(s) Oral daily  aspirin  chewable 81 milliGRAM(s) Oral daily  atorvastatin 80 milliGRAM(s) Oral at bedtime  heparin  Injectable 5000 Unit(s) SubCutaneous every 8 hours  labetalol 200 milliGRAM(s) Oral every 12 hours  lisinopril 20 milliGRAM(s) Oral daily    PRN MEDICATIONS    VITALS:   T(F): 96.3  HR: 62  BP: 121/76  RR: 18  SpO2: 100%    LABS:                        10.7   8.14  )-----------( 222      ( 09 Sep 2018 07:52 )             32.4         144  |  107  |  27<H>  ----------------------------<  83  4.7   |  21  |  1.7<H>    Ca    9.4      09 Sep 2018 07:52  Mg     2.0         TPro  6.9  /  Alb  3.8  /  TBili  0.3  /  DBili  x   /  AST  17  /  ALT  18  /  AlkPhos  66      PT/INR - ( 08 Sep 2018 18:20 )   PT: 11.00 sec;   INR: 1.02 ratio         PTT - ( 08 Sep 2018 18:20 )  PTT:30.5 sec  Urinalysis Basic - ( 08 Sep 2018 20:04 )    Color: Yellow / Appearance: Clear / S.010 / pH: x  Gluc: x / Ketone: Negative  / Bili: Negative / Urobili: 0.2 mg/dL   Blood: x / Protein: Negative mg/dL / Nitrite: Negative   Leuk Esterase: Negative / RBC: x / WBC x   Sq Epi: x / Non Sq Epi: x / Bacteria: x        Troponin T, Serum: <0.01 ng/mL (18 @ 07:52)  Troponin T, Serum: <0.01 ng/mL (18 @ 00:19)  Creatine Kinase, Serum: 130 U/L (18 @ 00:19)  Troponin T, Serum: <0.01 ng/mL (18 @ 18:20)  Creatine Kinase, Serum: 173 U/L (18 @ 18:20)      CARDIAC MARKERS ( 09 Sep 2018 07:52 )  x     / <0.01 ng/mL / x     / x     / 2.3 ng/mL  CARDIAC MARKERS ( 09 Sep 2018 00:19 )  x     / <0.01 ng/mL / 130 U/L / x     / 2.2 ng/mL  CARDIAC MARKERS ( 08 Sep 2018 18:20 )  x     / <0.01 ng/mL / 173 U/L / x     / 1.9 ng/mL      RADIOLOGY:    PHYSICAL EXAM:  GEN: No acute distress  LUNGS: Clear to auscultation bilaterally   HEART:  RRR.   ABD: Soft, non-tender, non-distended. EXT: No edema  NEURO: AAOX3    Assessment and Plan:  53 yr F HTN , DLD  , HF with pEF ,TIA ()CAD s/p PCI in 10/17 complicated by pseudoaneurysm s/p repair in  ,Left leg  ileofemoral by pass in  ,complicated by staph bacteremia requiring IV Nafcillin  ,DIONICIO on CKD requiring approx 5 sessions of H,D in  (PIGN on kidney biopsy) presented with  witnessed syncope .    Syncope: likely orthostatic, received 2L IVF, orthostatics negative after; CTH neg,   - cw tele monitoring for now   - 2d  - Holding hydralazine    CAD/HTN/DLD/HF; stable, CE neg x2, no EKG changes  - 2d echo  - C/w current meds  - Holding hydralazin    Thyroid nodule on ct scan;  - TSH pending  - Outpatient f/u     Leukocytosis: resolved; UA & CXR neg  - Repeat CBC    CKD4: stable    DVT ppx; sub Q heparin  FULL CODE   Dispo: likely DC tomorrow
24H events:    Patient is a 53y old Female who presents with a chief complaint of syncope (08 Sep 2018 22:44)    Primary diagnosis of Syncope and collapse     Today is hospital day 1d.     PAST MEDICAL & SURGICAL HISTORY  CKD (chronic kidney disease)  Pseudoaneurysm  TIA (transient ischemic attack)  Dyslipidemia  CHF (congestive heart failure)  CAD (coronary artery disease)  HTN (hypertension)  History of peripheral artery bypass  Anastomotic arterial aneurysm  H/O angioplasty    SOCIAL HISTORY:  Negative for smoking/alcohol/drug use.     ALLERGIES:  ciprofloxacin (Rash)  Keflex (Unknown)    MEDICATIONS:  STANDING MEDICATIONS  amLODIPine   Tablet 5 milliGRAM(s) Oral daily  aspirin  chewable 81 milliGRAM(s) Oral daily  atorvastatin 80 milliGRAM(s) Oral at bedtime  heparin  Injectable 5000 Unit(s) SubCutaneous every 8 hours  labetalol 200 milliGRAM(s) Oral every 12 hours  lisinopril 20 milliGRAM(s) Oral daily    PRN MEDICATIONS    VITALS:   T(F): 97.5  HR: 67  BP: 116/77  RR: 18  SpO2: 98%    LABS:                        13.2   19.26 )-----------( 302      ( 08 Sep 2018 18:20 )             40.4         138  |  99  |  30<H>  ----------------------------<  137<H>  5.2<H>   |  20  |  2.2<H>    Ca    9.8      08 Sep 2018 18:20  Mg     2.0           PT/INR - ( 08 Sep 2018 18:20 )   PT: 11.00 sec;   INR: 1.02 ratio         PTT - ( 08 Sep 2018 18:20 )  PTT:30.5 sec  Urinalysis Basic - ( 08 Sep 2018 20:04 )    Color: Yellow / Appearance: Clear / S.010 / pH: x  Gluc: x / Ketone: Negative  / Bili: Negative / Urobili: 0.2 mg/dL   Blood: x / Protein: Negative mg/dL / Nitrite: Negative   Leuk Esterase: Negative / RBC: x / WBC x   Sq Epi: x / Non Sq Epi: x / Bacteria: x        Troponin T, Serum: <0.01 ng/mL (18 @ 00:19)  Creatine Kinase, Serum: 130 U/L (18 @ 00:19)  Troponin T, Serum: <0.01 ng/mL (18 @ 18:20)  Creatine Kinase, Serum: 173 U/L (18 @ 18:20)      CARDIAC MARKERS ( 09 Sep 2018 00:19 )  x     / <0.01 ng/mL / 130 U/L / x     / 2.2 ng/mL  CARDIAC MARKERS ( 08 Sep 2018 18:20 )  x     / <0.01 ng/mL / 173 U/L / x     / 1.9 ng/mL      RADIOLOGY:    PHYSICAL EXAM:  GEN: No acute distress  LUNGS: Clear to auscultation bilaterally   HEART: S1/S2 present. RRR.   ABD: Soft, non-tender, non-distended. Bowel sounds present  EXT: No edema  NEURO: AAOX3

## 2018-09-10 NOTE — DISCHARGE NOTE ADULT - ADDITIONAL INSTRUCTIONS
Please see primary care doctor within 1 week for evaluation, blood pressure check, discuss restarting hydralazine, discuss ultrasound of thyroid nodule.   If symptoms recur please call your physician or return to ED.

## 2018-09-10 NOTE — PATIENT PROFILE ADULT. - FUNCTIONAL SCREEN CURRENT LEVEL: SWALLOWING (IF SCORE 2 OR MORE FOR ANY ITEM, CONSULT REHAB SERVICES), MLM)
Patient called and stated his bronchial and sinus symptoms have not improved on the Zpack and would like a repeat prescription  Encouraged an appointment for reevaluation but states he cannot come into the office, he stated that he has needed to have "a double" prescription in the past due to his smoking  Discussed at great length with the patient concerns regarding his care   Ordered doxy 100 mg BID for sinus and chest congestion, he will call if no improvement (0) swallows foods/liquids without difficulty

## 2018-09-10 NOTE — DISCHARGE NOTE ADULT - FINDINGS/TREATMENT
< from: CT Cervical Spine No Cont (09.08.18 @ 18:54) >    1.  No evidence of acute cervical spine fracture or dislocation.    2.  Degenerative changes of the cervical spine worse at C4-5 and C5-6 as   described above.    3.  Straightening of the normal cervical lordosis, with mild   anterolisthesis of C2 slightly anterior on C3.     4.  Multiple left thyroid lobe hypodense nodules, largest measuring   approximately 1.9 cm. Nonemergent outpatient thyroid ultrasound may be   obtained for further evaluation. < from: Transthoracic Echocardiogram (09.10.18 @ 08:37) >     1. Left ventricular ejection fraction, by visual estimation, is 60 to   65%.   2. Normal global left ventricular systolic function.   3. Spectral Doppler shows impaired relaxation pattern of left   ventricular myocardial filling (Grade I diastolic dysfunction).   4. Mild mitral valve regurgitation.    < end of copied text >    < from: Transthoracic Echocardiogram (09.10.18 @ 08:37) >     1. Left ventricular ejection fraction, by visual estimation, is 60 to   65%.   2. Normal global left ventricular systolic function.   3. Spectral Doppler shows impaired relaxation pattern of left   ventricular myocardial filling (Grade I diastolic dysfunction).   4. Mild mitral valve regurgitation.

## 2018-09-10 NOTE — DISCHARGE NOTE ADULT - CARE PLAN
Principal Discharge DX:	Syncope and collapse  Goal:	Prevention  Assessment and plan of treatment:	Low BP on admisson, improved with fluids. Stop hydralazine. EKG and telemetry no events, cardiac enzymes negative.   Follow up with primary care within 1 week to check BP, discuss restarting hydralazine  If symptoms recur please return to ED  Secondary Diagnosis:	Thyroid nodule  Goal:	Management  Assessment and plan of treatment:	Outpatient ultrasound of thyroid nodule seen on CT scan: "Multiple left thyroid lobe hypodense nodules, largest measuring approximately 1.9 cm"

## 2018-09-10 NOTE — DISCHARGE NOTE ADULT - HOSPITAL COURSE
53F w PMHx of HTN, DLD, HFpEF, TIA (2016)CAD s/p PCI in 10/17 complicated by pseudoaneurysm s/p repair in 11/17 ,Left leg  ileofemoral by pass in 2/18 ,complicated by staph bacteremia requiring IV Nafcillin  ,DIONICIO on CKD requiring approx 5 sessions of H,D in 12/18 (PIGN on kidney biopsy) presented with  witnessed syncope, preceded by numbness in her tongue and followed by diaphoresis. In ER, BP was 85/53, was given 2L of fluid and BP improved. Orthostatics negative, EKG NSR, no events on tele, CT scan of head and cervical spine showd no acute pathology. Echo not concerning for syncope. Found to have thyroid lesion, needs outpatient followup.

## 2018-09-10 NOTE — DISCHARGE NOTE ADULT - CARE PROVIDER_API CALL
Ranjith Mckeon), Geriatric Medicine; Internal Medicine  [address] [place] [id number]  Phone: [phone]  Fax: [phone]

## 2018-09-13 ENCOUNTER — MEDICATION RENEWAL (OUTPATIENT)
Age: 53
End: 2018-09-13

## 2018-09-13 ENCOUNTER — APPOINTMENT (OUTPATIENT)
Dept: OTOLARYNGOLOGY | Facility: CLINIC | Age: 53
End: 2018-09-13

## 2018-09-14 DIAGNOSIS — E86.0 DEHYDRATION: ICD-10-CM

## 2018-09-14 DIAGNOSIS — E04.1 NONTOXIC SINGLE THYROID NODULE: ICD-10-CM

## 2018-09-14 DIAGNOSIS — Z79.82 LONG TERM (CURRENT) USE OF ASPIRIN: ICD-10-CM

## 2018-09-14 DIAGNOSIS — I13.0 HYPERTENSIVE HEART AND CHRONIC KIDNEY DISEASE WITH HEART FAILURE AND STAGE 1 THROUGH STAGE 4 CHRONIC KIDNEY DISEASE, OR UNSPECIFIED CHRONIC KIDNEY DISEASE: ICD-10-CM

## 2018-09-14 DIAGNOSIS — I50.32 CHRONIC DIASTOLIC (CONGESTIVE) HEART FAILURE: ICD-10-CM

## 2018-09-14 DIAGNOSIS — I95.1 ORTHOSTATIC HYPOTENSION: ICD-10-CM

## 2018-09-14 DIAGNOSIS — I25.10 ATHEROSCLEROTIC HEART DISEASE OF NATIVE CORONARY ARTERY WITHOUT ANGINA PECTORIS: ICD-10-CM

## 2018-09-14 DIAGNOSIS — R55 SYNCOPE AND COLLAPSE: ICD-10-CM

## 2018-09-14 DIAGNOSIS — E87.5 HYPERKALEMIA: ICD-10-CM

## 2018-09-14 DIAGNOSIS — Z86.73 PERSONAL HISTORY OF TRANSIENT ISCHEMIC ATTACK (TIA), AND CEREBRAL INFARCTION WITHOUT RESIDUAL DEFICITS: ICD-10-CM

## 2018-09-14 DIAGNOSIS — N18.4 CHRONIC KIDNEY DISEASE, STAGE 4 (SEVERE): ICD-10-CM

## 2018-09-25 ENCOUNTER — MEDICATION RENEWAL (OUTPATIENT)
Age: 53
End: 2018-09-25

## 2018-09-25 ENCOUNTER — APPOINTMENT (OUTPATIENT)
Dept: VASCULAR SURGERY | Facility: CLINIC | Age: 53
End: 2018-09-25
Payer: MEDICAID

## 2018-10-09 ENCOUNTER — APPOINTMENT (OUTPATIENT)
Dept: VASCULAR SURGERY | Facility: CLINIC | Age: 53
End: 2018-10-09
Payer: MEDICAID

## 2018-10-09 PROBLEM — E78.5 HYPERLIPIDEMIA, UNSPECIFIED: Chronic | Status: ACTIVE | Noted: 2018-09-08

## 2018-10-09 PROBLEM — I10 ESSENTIAL (PRIMARY) HYPERTENSION: Chronic | Status: ACTIVE | Noted: 2018-09-08

## 2018-10-09 PROBLEM — I72.9 ANEURYSM OF UNSPECIFIED SITE: Chronic | Status: ACTIVE | Noted: 2018-09-08

## 2018-10-09 PROBLEM — N18.9 CHRONIC KIDNEY DISEASE, UNSPECIFIED: Chronic | Status: ACTIVE | Noted: 2018-09-08

## 2018-10-09 PROBLEM — I25.10 ATHEROSCLEROTIC HEART DISEASE OF NATIVE CORONARY ARTERY WITHOUT ANGINA PECTORIS: Chronic | Status: ACTIVE | Noted: 2018-09-08

## 2018-10-09 PROBLEM — I50.9 HEART FAILURE, UNSPECIFIED: Chronic | Status: ACTIVE | Noted: 2018-09-08

## 2018-10-09 PROBLEM — G45.9 TRANSIENT CEREBRAL ISCHEMIC ATTACK, UNSPECIFIED: Chronic | Status: ACTIVE | Noted: 2018-09-08

## 2018-10-09 PROCEDURE — 93880 EXTRACRANIAL BILAT STUDY: CPT

## 2018-10-09 PROCEDURE — 99213 OFFICE O/P EST LOW 20 MIN: CPT

## 2019-01-22 ENCOUNTER — OUTPATIENT (OUTPATIENT)
Dept: OUTPATIENT SERVICES | Facility: HOSPITAL | Age: 54
LOS: 1 days | Discharge: HOME | End: 2019-01-22

## 2019-01-22 ENCOUNTER — APPOINTMENT (OUTPATIENT)
Dept: CARDIOLOGY | Facility: CLINIC | Age: 54
End: 2019-01-22

## 2019-01-22 VITALS
BODY MASS INDEX: 32.6 KG/M2 | HEART RATE: 67 BPM | HEIGHT: 63 IN | SYSTOLIC BLOOD PRESSURE: 96 MMHG | DIASTOLIC BLOOD PRESSURE: 66 MMHG | WEIGHT: 184 LBS

## 2019-01-22 DIAGNOSIS — Z98.62 PERIPHERAL VASCULAR ANGIOPLASTY STATUS: Chronic | ICD-10-CM

## 2019-01-22 DIAGNOSIS — I72.9 ANEURYSM OF UNSPECIFIED SITE: Chronic | ICD-10-CM

## 2019-01-22 DIAGNOSIS — Z86.79 PERSONAL HISTORY OF OTHER DISEASES OF THE CIRCULATORY SYSTEM: ICD-10-CM

## 2019-01-22 DIAGNOSIS — I25.10 ATHEROSCLEROTIC HEART DISEASE OF NATIVE CORONARY ARTERY WITHOUT ANGINA PECTORIS: ICD-10-CM

## 2019-01-22 DIAGNOSIS — Z98.890 OTHER SPECIFIED POSTPROCEDURAL STATES: Chronic | ICD-10-CM

## 2019-01-22 LAB
ALBUMIN SERPL ELPH-MCNC: 4.3 G/DL
ALBUMIN SERPL ELPH-MCNC: 4.4 G/DL
ALP BLD-CCNC: 106 U/L
ALP BLD-CCNC: 80 U/L
ALT SERPL-CCNC: 23 U/L
ALT SERPL-CCNC: 27 U/L
ANION GAP SERPL CALC-SCNC: 16 MMOL/L
ANION GAP SERPL CALC-SCNC: 21 MMOL/L
AST SERPL-CCNC: 17 U/L
AST SERPL-CCNC: 21 U/L
BASOPHILS # BLD AUTO: 0.06 K/UL
BASOPHILS # BLD AUTO: 0.08 K/UL
BASOPHILS NFR BLD AUTO: 0.8 %
BASOPHILS NFR BLD AUTO: 0.9 %
BILIRUB SERPL-MCNC: 0.2 MG/DL
BILIRUB SERPL-MCNC: 0.3 MG/DL
BUN SERPL-MCNC: 24 MG/DL
BUN SERPL-MCNC: 28 MG/DL
CALCIUM SERPL-MCNC: 9.6 MG/DL
CALCIUM SERPL-MCNC: 9.9 MG/DL
CHLORIDE SERPL-SCNC: 101 MMOL/L
CHLORIDE SERPL-SCNC: 96 MMOL/L
CHOLEST SERPL-MCNC: 137 MG/DL
CHOLEST SERPL-MCNC: 139 MG/DL
CHOLEST/HDLC SERPL: 2.9 RATIO
CHOLEST/HDLC SERPL: 3 RATIO
CO2 SERPL-SCNC: 21 MMOL/L
CO2 SERPL-SCNC: 24 MMOL/L
CREAT SERPL-MCNC: 1.9 MG/DL
CREAT SERPL-MCNC: 2 MG/DL
EOSINOPHIL # BLD AUTO: 0.29 K/UL
EOSINOPHIL # BLD AUTO: 0.33 K/UL
EOSINOPHIL NFR BLD AUTO: 3.3 %
EOSINOPHIL NFR BLD AUTO: 4.1 %
GLUCOSE SERPL-MCNC: 104 MG/DL
GLUCOSE SERPL-MCNC: 88 MG/DL
HCT VFR BLD CALC: 35.1 %
HCT VFR BLD CALC: 42 %
HDLC SERPL-MCNC: 47 MG/DL
HDLC SERPL-MCNC: 47 MG/DL
HGB BLD-MCNC: 11.4 G/DL
HGB BLD-MCNC: 13.3 G/DL
IMM GRANULOCYTES NFR BLD AUTO: 0.3 %
IMM GRANULOCYTES NFR BLD AUTO: 0.3 %
LDLC SERPL CALC-MCNC: 55 MG/DL
LDLC SERPL CALC-MCNC: 67 MG/DL
LYMPHOCYTES # BLD AUTO: 1.7 K/UL
LYMPHOCYTES # BLD AUTO: 2.14 K/UL
LYMPHOCYTES NFR BLD AUTO: 21.3 %
LYMPHOCYTES NFR BLD AUTO: 24.4 %
MAN DIFF?: NORMAL
MAN DIFF?: NORMAL
MCHC RBC-ENTMCNC: 26.7 PG
MCHC RBC-ENTMCNC: 27 PG
MCHC RBC-ENTMCNC: 31.7 G/DL
MCHC RBC-ENTMCNC: 32.5 G/DL
MCV RBC AUTO: 82.2 FL
MCV RBC AUTO: 85.2 FL
MONOCYTES # BLD AUTO: 0.41 K/UL
MONOCYTES # BLD AUTO: 0.51 K/UL
MONOCYTES NFR BLD AUTO: 5.1 %
MONOCYTES NFR BLD AUTO: 5.8 %
NEUTROPHILS # BLD AUTO: 5.45 K/UL
NEUTROPHILS # BLD AUTO: 5.71 K/UL
NEUTROPHILS NFR BLD AUTO: 65.3 %
NEUTROPHILS NFR BLD AUTO: 68.4 %
PLATELET # BLD AUTO: 264 K/UL
PLATELET # BLD AUTO: 307 K/UL
POTASSIUM SERPL-SCNC: 5.1 MMOL/L
POTASSIUM SERPL-SCNC: 5.2 MMOL/L
PROT SERPL-MCNC: 7.5 G/DL
PROT SERPL-MCNC: 7.7 G/DL
RBC # BLD: 4.27 M/UL
RBC # BLD: 4.93 M/UL
RBC # FLD: 13.8 %
RBC # FLD: 14.1 %
SODIUM SERPL-SCNC: 138 MMOL/L
SODIUM SERPL-SCNC: 141 MMOL/L
TRIGL SERPL-MCNC: 209 MG/DL
TRIGL SERPL-MCNC: 230 MG/DL
WBC # FLD AUTO: 7.97 K/UL
WBC # FLD AUTO: 8.76 K/UL

## 2019-01-22 NOTE — ASSESSMENT
[FreeTextEntry1] : CAD s/p PCI RCA.\par Residual LAD stenosis (small vessel / asymptomatic).\par \par Mixed cardiomyopathy (ischemic / valvular / stress post CVA).\par Subsequent improvement in LVSF and MR.\par No clinical CHF.\par \par BP's low.\par \par Ruptured left CFA pseudoaneurysm s/p patch angioplasty.\par Disrupted patch angioplasty s/p iliofemoral PTFE bypass.\par Ruptured distal anastomosis s/p iliofemoral vein bypass.

## 2019-01-22 NOTE — REASON FOR VISIT
[Follow-Up - Clinic] : a clinic follow-up of [Cardiomyopathy] : cardiomyopathy [Coronary Artery Disease] : coronary artery disease [Hypertension] : hypertension [Mitral Regurgitation] : mitral regurgitation [FreeTextEntry1] : Feels well.\par \par Not much exercise.  Walks a few blocks daily without exertional symptoms.  Mild LLE discomfort after several blocks.\par \par No angina.  Breathing comfortable.  No palpitations, lightheadedness, syncope.\par \par Following with Dr. Sal.  Has not seen nephrologist.\par \par Smoking < 1/2 ppd.  Not interested in patch / Rx.\par \par Gained 27-lbs.

## 2019-01-22 NOTE — DISCUSSION/SUMMARY
[FreeTextEntry1] : Cont ASA.\par Cont Toprol, Lisinopril, Lipitor.\par Stop Norvasc.\par Smoking cessation.\par Light exercise / weight loss.\par Labs.\par Nephrology, vascular, and PMD follow-up.\par Follow-up 3-months.

## 2019-04-09 ENCOUNTER — APPOINTMENT (OUTPATIENT)
Dept: VASCULAR SURGERY | Facility: CLINIC | Age: 54
End: 2019-04-09
Payer: MEDICAID

## 2019-04-23 PROBLEM — I70.492: Status: ACTIVE | Noted: 2019-04-23

## 2019-04-30 ENCOUNTER — APPOINTMENT (OUTPATIENT)
Dept: CARDIOLOGY | Facility: CLINIC | Age: 54
End: 2019-04-30
Payer: MEDICAID

## 2019-04-30 VITALS
BODY MASS INDEX: 34.02 KG/M2 | HEART RATE: 73 BPM | DIASTOLIC BLOOD PRESSURE: 80 MMHG | SYSTOLIC BLOOD PRESSURE: 140 MMHG | HEIGHT: 63 IN | WEIGHT: 192 LBS

## 2019-04-30 PROCEDURE — 93000 ELECTROCARDIOGRAM COMPLETE: CPT

## 2019-04-30 PROCEDURE — 99214 OFFICE O/P EST MOD 30 MIN: CPT

## 2019-04-30 RX ORDER — ASPIRIN 81 MG/1
81 TABLET, CHEWABLE ORAL
Qty: 30 | Refills: 5 | Status: DISCONTINUED | COMMUNITY
Start: 2018-03-26 | End: 2019-04-30

## 2019-04-30 RX ORDER — AMLODIPINE BESYLATE 5 MG/1
5 TABLET ORAL DAILY
Qty: 30 | Refills: 5 | Status: DISCONTINUED | COMMUNITY
Start: 2017-11-21 | End: 2019-04-30

## 2019-05-03 ENCOUNTER — OTHER (OUTPATIENT)
Age: 54
End: 2019-05-03

## 2019-05-03 ENCOUNTER — APPOINTMENT (OUTPATIENT)
Dept: VASCULAR SURGERY | Facility: CLINIC | Age: 54
End: 2019-05-03
Payer: MEDICAID

## 2019-05-03 VITALS
DIASTOLIC BLOOD PRESSURE: 96 MMHG | SYSTOLIC BLOOD PRESSURE: 134 MMHG | HEIGHT: 63 IN | WEIGHT: 192 LBS | BODY MASS INDEX: 34.02 KG/M2

## 2019-05-03 DIAGNOSIS — I70.492 OTHER ATHEROSCLEROSIS OF AUTOLOGOUS VEIN BYPASS GRAFT(S) OF THE EXTREMITIES, LEFT LEG: ICD-10-CM

## 2019-05-03 PROCEDURE — 93926 LOWER EXTREMITY STUDY: CPT

## 2019-05-03 PROCEDURE — 99213 OFFICE O/P EST LOW 20 MIN: CPT

## 2019-05-03 NOTE — ASSESSMENT
[FreeTextEntry1] : Left iliofemoral graft duplex shows widely patent graft with some fluid around it. Both anastomosis proximal and distal well within normal limits. Patient remains asymptomatic and is ambulating without difficulty. She is a palpable posterior tibial pulse. Her incisions are all well healed. She has no flank hernia. Will still see her back in 6 months time sooner should she have any issues.

## 2019-05-03 NOTE — DATA REVIEWED
[FreeTextEntry1] : Left iliofemoral graft duplex shows widely patent graft with some fluid around it. Both anastomosis proximal and distal well within normal limits.

## 2019-05-03 NOTE — CONSULT LETTER
[Dear  ___] : Dear  [unfilled], [Courtesy Letter:] : I had the pleasure of seeing your patient, [unfilled], in my office today. [Please see my note below.] : Please see my note below. [Referral Closing:] : Thank you very much for seeing this patient.  If you have any questions, please do not hesitate to contact me. [Sincerely,] : Sincerely, [FreeTextEntry2] : Dr Ranjith Mckeon

## 2019-07-10 NOTE — DISCUSSION/SUMMARY
[FreeTextEntry1] : Cont ASA and Lipitor.\par Cont Toprol and Lisinopril\par Smoking cessation.\par Light exercise / weight loss.\par Labs.\par Nephrology follow-up (referral again provided).\par Vascular and PMD follow-up.\par Follow-up 3-months.

## 2019-07-10 NOTE — REASON FOR VISIT
[Follow-Up - Clinic] : a clinic follow-up of [Cardiomyopathy] : cardiomyopathy [Coronary Artery Disease] : coronary artery disease [Hypertension] : hypertension [Mitral Regurgitation] : mitral regurgitation [FreeTextEntry1] : Feels well.\par \par Still walking a few blocks daily.  No other exercise.  Persistent mild LLE discomfort after several blocks (particularly uphill).  Sometimes requiring brief rest.\par \par No angina.  Breathing comfortable.  No palpitations, lightheadedness, syncope.\par \par Did not seen nephrologist.\par \par Still smoking < 1/2 ppd.\par \par Gained another 8-lbs.\par \par Labs reviewed (1/22/19):\par Cr 1.9     K 5.1\par CBC / CMP otherwise unremarkable.\par LDL 67  HDL 47

## 2019-07-10 NOTE — ASSESSMENT
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

## 2019-07-12 NOTE — DISCHARGE NOTE ADULT - DEVELOP COPING SKILLS. FOR EXAMPLE, STRATEGIES AND LIFESTYLE CHANGES THAT REDUCE NEGATIVE MOODS, STRESS, AND EXPOSURE TO SMOKING CUES
Problem: Mobility Impaired (Adult and Pediatric)  Goal: *Therapy Goal (Edit Goal, Insert Text)  Description  Physical Therapy Goals: STG  Initiated 6/26/2019, reassessed 7/3/19 and to be accomplished 7/10/2019:   1. Patient will move from supine to sit and sit to supine , scoot up and down and roll side to side in bed with supervision/set-up. ( 7/3/2019 MET)     2. Patient will transfer from bed to chair and chair to bed with supervision/set-up using the least restrictive device. (7/3/2019 MET)  3. Patient will perform sit to stand with supervision/set-up. (7/3/2019 MET)  4. Patient will ambulate with supervision/set-up for 50 feet with the least restrictive device. (MET for SBA level using rollator)  5. Patient will ascend/descend 3 stairs with 2 handrail(s) with minimal assistance/contact guard assist. (MET for SBA)     Physical Therapy Goals: LTG  Initiated 6/26/2019 and to be accomplished within 21 day(s) 7/17/2019:   1. Patient will move from supine to sit and sit to supine , scoot up and down and roll side to side in bed with modified independence. (Goal met 7/12/2019)    2. Patient will transfer from bed to chair and chair to bed with modified independence using the least restrictive device. 3.  Patient will perform sit to stand with modified independence. (Goal met 7/12/2019)   4.  Updated 7/5/2019: Patient will ambulate with modified independence for 150 feet with the least restrictive device. 5.  Patient will ascend/descend 3 stairs with 2 handrail(s) with supervision/set-up. (Goal met 7/12/2019)            7/12/2019 1405 by Lilly Nieto PT  Outcome: Progressing Towards Goal; Not yet completely met goals. PHYSICAL THERAPY DISCHARGE NOTE    Patient: Nessa Vega (7 y.o. female)  Date: 7/12/2019  Diagnosis: Chronic respiratory failure (HCC) [J96.10] Bilateral vocal cord paralysis  Precautions: Aspiration, Fall  Chart, physical therapy assessment, plan of care and goals were reviewed. Pain:  Patient reporting abdominal discomfort toward end of session and asking to go to the bathroom. Time in: 805  Time out 905    Pt seen for: Therapeutic activity, gait training, wheelchair management    Patient identified with name and : yes    SUBJECTIVE:     Patient agreeable to treatment; needing min encouragement initially. Patient reporting that she slept better last night, however. OBJECTIVE DATA SUMMARY:   AROM: Patient WFL for bilateral LE    FIM SCORES Initial Assessment Discharge Assessment   Bed/Chair/Wheelchair Transfers 4 5   Wheelchair Mobility (not challenged today; to be further assessed) 2   Walking [place] 1 2   Steps/Stairs 1 2   PRIMARY MODE OF LOCOMOTION: using wheelchair for most of mobility as patient fatiguing quickly, with gait does use rollator  Please see [facility] Interdisciplinary Eval: Coordination/Balance Section for details regarding FIM score description. BED/CHAIR/WHEELCHAIR TRANSFERS Initial Assessment Discharge Assessment   Rolling Right 4 (Minimal assistance) 6 (Modified independent)   Rolling Left 4 (Minimal assistance) 6 (Modified independent)   Supine to Sit 4 (Minimal assistance)(Head of bed elevated as per physician order) 6 (Modified independent)   Sit to Stand Minimal assistance Modified independent   Sit to Supine 4 (Minimal assistance) 6 (Modified independent)   Transfer Assist Score 4 5   Transfer Type Other Other   Comments stand step transfer without AD stand step transfer without AD, continues to need assist with management of tubing/lines   Car Transfer Not tested (PT demonstrated and verbally reviewed only); patient declining practice of car transfer simulator as she reported that she had abdominal discomfort and needed to use the bathroom.     Car Type N/A car transfer simulator       WHEELCHAIR MOBILITY/MANAGEMENT Initial Assessment Discharge Assessment   Able to Propel (not challenged today) 100 feet(assist with tubing management only) Functional Level (not challenged today; to be further assessed) 2   Curbs/ramps assistance required 0 (Not tested) 0 (Not tested)   Wheelchair set up assistance required 0 (Not tested) 6 (Modified independent)   Wheelchair management (not challenged today; to be further assessed) Manages left brake;Manages right brake       WALKING INDEPENDENCE Initial Assessment Discharge Assessment   Assistive device (no AD as per PLOF for household gait) Walker, rollator   Ambulation assistance - level surface    SBA   Distance 8 Feet (ft) 75 Feet (ft)   Functional Level 1 2   Comments 8 ft within room without device, steadying assist needed but needing seated rest after 8 ft 75 ft distance using rollator SBA with assist with O2 tubing management   Ambulation assistance - unlevel surface    Not emphasis of treatment session. STEPS/STAIRS Initial Assessment Discharge Assessment   Steps/Stairs ambulated (not able to assess today due to patient fatigue) 4   Rail Use (N/A) Both(both handrails to ascend, right railing to sidestep down eddy)   Functional Level 1 2   Comments unable to challenge today up/down 4 steps at SBA level with assist with tubing management   Curbs/Ramps    Not assessed today; will need further assessment with [facility] PT     LTGs: Patient met 3 LTG, not yet mod I for all mobility. ASSESSMENT:  Patient has made gains toward her functional goals, however, continues to require frequent rests due to fatigue. Patient continues to insist on performing all mobility and even when at rest using oxygen; patient continues to report needing \"air flow\" to help her feel as though she can breathe. Patient SpO2 on 2 LPM during treatment session 99% with HR 67 bpm. Patient not agreeable to checking when on room air.  Recommend [facility] PT continue to work with patient on slowly improving endurance, improving her tolerance to performing activity as well as continuing education regarding exertional shortness of breath versus need for oxygen. Patient continues to get somewhat anxious with all mobility and with increased shortness of breath. PLAN:  Pt would benefit from continued skilled physical therapy in order to improve independent functional mobility at household level. Interventions may include range of motion (AROM, PROM B LE/trunk), motor function (B LE/trunk strengthening/coordination), activity tolerance (vitals, oxygen saturation levels), bed mobility training, balance activities, gait training (progressive ambulation program), and functional transfer training. Discharge Recommendations:  Home Health  Further Equipment Recommendations for Discharge:  hospital bed, rollator and wheelchair 22 inch       Activity Tolerance:   Fair to poor depending on fatigue and report of shortness of breath. Please refer to the flowsheet for vital signs taken during this treatment. After treatment:   ? Patient left in no apparent distress sitting up in chair  ? Patient left in no apparent distress in bed  ? Call bell left within reach  ? Nursing notified  ? Caregiver present  ?  Bed alarm activated      Sharita Brower, PT  7/12/2019 Statement Selected

## 2019-09-03 ENCOUNTER — APPOINTMENT (OUTPATIENT)
Dept: CARDIOLOGY | Facility: CLINIC | Age: 54
End: 2019-09-03
Payer: MEDICAID

## 2019-09-03 VITALS
HEIGHT: 63 IN | HEART RATE: 70 BPM | DIASTOLIC BLOOD PRESSURE: 78 MMHG | WEIGHT: 201 LBS | BODY MASS INDEX: 35.61 KG/M2 | SYSTOLIC BLOOD PRESSURE: 118 MMHG

## 2019-09-03 PROCEDURE — 93000 ELECTROCARDIOGRAM COMPLETE: CPT

## 2019-09-03 PROCEDURE — 99214 OFFICE O/P EST MOD 30 MIN: CPT

## 2019-09-03 NOTE — REASON FOR VISIT
[Follow-Up - Clinic] : a clinic follow-up of [Cardiomyopathy] : cardiomyopathy [Coronary Artery Disease] : coronary artery disease [Mitral Regurgitation] : mitral regurgitation [Hypertension] : hypertension [FreeTextEntry1] : Feels well.\par \par Stable mild claudication (prolonged uphill).  Otherwise walking without exertional symptoms.\par \par Gained another 9-lbs.\par \par Still smoking.  Wellbutrin discussed.  Not interested in quitting.\par \par No angina.  Breathing comfortable.  No palpitations, lightheadedness, syncope.\par \par Did not seen nephrologist.\par \par Saw Dr. Sal.  Patent graft on US (5/2019).

## 2019-09-03 NOTE — DISCUSSION/SUMMARY
[FreeTextEntry1] : Cont ASA and Lipitor.\par Cont Toprol and Lisinopril\par Light exercise / weight loss.\par Labs.\par Nephrology follow-up (again advised).\par Vascular and PMD follow-up.\par Follow-up 6-months.

## 2019-11-01 ENCOUNTER — OTHER (OUTPATIENT)
Age: 54
End: 2019-11-01

## 2019-11-01 ENCOUNTER — APPOINTMENT (OUTPATIENT)
Dept: VASCULAR SURGERY | Facility: CLINIC | Age: 54
End: 2019-11-01

## 2020-01-07 ENCOUNTER — APPOINTMENT (OUTPATIENT)
Dept: VASCULAR SURGERY | Facility: CLINIC | Age: 55
End: 2020-01-07
Payer: MEDICAID

## 2020-01-07 VITALS
SYSTOLIC BLOOD PRESSURE: 122 MMHG | WEIGHT: 201 LBS | HEIGHT: 63 IN | DIASTOLIC BLOOD PRESSURE: 74 MMHG | BODY MASS INDEX: 35.61 KG/M2

## 2020-01-07 PROCEDURE — 93926 LOWER EXTREMITY STUDY: CPT

## 2020-01-07 PROCEDURE — 99213 OFFICE O/P EST LOW 20 MIN: CPT

## 2020-01-07 NOTE — ASSESSMENT
[FreeTextEntry1] : The patient is a 54-year-old female status post left obturator bypass iliofemoral bypass for an infected graft. On duplex exam today the patient has a proximal anastomosis flow disturbance. I would like to send the patient for a CTA of her aorta and lower extremity runoff to evaluate her graft. Insetting of her BUN and creatinine prior to that. I will see the patient back in my office after the CT and grams performed.

## 2020-01-07 NOTE — DATA REVIEWED
[FreeTextEntry1] : Arterial duplex Left iliofemoral vein bypass. There is a flow disturbance seen in the proximal anastomosis. A velocity of 655 cm per sec and possibly due to vessel geometry and tortuosity. The distal anastomosis outflow pain no evidence of significant disease.

## 2020-01-07 NOTE — HISTORY OF PRESENT ILLNESS
[FreeTextEntry1] : Ms. Resendez is a 53 year-old female who underwent cardiac catheterization in November 2017, developed large left femoral PSA that ruptured and underwent open repair with patch plasty in November. She then developed disruption of the patch plasty, was in hemorrhagic shock and developed renal failure and was on hemodialysis.  She then underwent left iliofemoral PTFE bypass on 12/22/17 that went on to rupture at the distal anastomosis and then underwent left iliofemoral vein bypass graft on 01/31/18.\par \par She is currently doing well, denies any claudication, rest pain and reports that the swelling is improving. On her previous duplex 6 months ago the patient had fluid around the proximal and distal anastomosis. The patient presents today for follow up evaluation she can ambulate 4-5 blocks prior to claudication pain.

## 2020-01-13 LAB
BUN SERPL-MCNC: 22 MG/DL
CREAT SERPL-MCNC: 1.7 MG/DL

## 2020-08-17 ENCOUNTER — APPOINTMENT (OUTPATIENT)
Dept: CARDIOLOGY | Facility: CLINIC | Age: 55
End: 2020-08-17
Payer: MEDICAID

## 2020-08-17 VITALS
SYSTOLIC BLOOD PRESSURE: 156 MMHG | WEIGHT: 222 LBS | DIASTOLIC BLOOD PRESSURE: 98 MMHG | HEART RATE: 72 BPM | BODY MASS INDEX: 39.34 KG/M2 | HEIGHT: 63 IN | TEMPERATURE: 97.9 F

## 2020-08-17 PROCEDURE — 93000 ELECTROCARDIOGRAM COMPLETE: CPT

## 2020-08-17 PROCEDURE — 99214 OFFICE O/P EST MOD 30 MIN: CPT

## 2020-08-17 NOTE — DISCUSSION/SUMMARY
[FreeTextEntry1] : Cont ASA and Lipitor.\par Cont Toprol and Lisinopril\par Start Norvasc.\par Light exercise / weight loss.\par Labs.\par PMD / Nephrology / Vascular follow-up.\par Follow-up 6-months.

## 2020-08-17 NOTE — REASON FOR VISIT
[Cardiomyopathy] : cardiomyopathy [Follow-Up - Clinic] : a clinic follow-up of [Hypertension] : hypertension [Coronary Artery Disease] : coronary artery disease [Mitral Regurgitation] : mitral regurgitation [FreeTextEntry1] : Feels well.\par \par No exercise.  Lower back pain.  Gained another 21-lbs.\par \par Walks without exertional symptoms. Claudication improved.  \par \par Breathing comfortable.\par \par Still smoking 1/2 ppd.

## 2020-08-17 NOTE — ASSESSMENT
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

## 2020-08-19 ENCOUNTER — APPOINTMENT (OUTPATIENT)
Dept: INTERNAL MEDICINE | Facility: CLINIC | Age: 55
End: 2020-08-19
Payer: MEDICAID

## 2020-08-19 ENCOUNTER — OUTPATIENT (OUTPATIENT)
Dept: OUTPATIENT SERVICES | Facility: HOSPITAL | Age: 55
LOS: 1 days | Discharge: HOME | End: 2020-08-19

## 2020-08-19 VITALS
TEMPERATURE: 97.5 F | DIASTOLIC BLOOD PRESSURE: 115 MMHG | SYSTOLIC BLOOD PRESSURE: 157 MMHG | HEART RATE: 86 BPM | BODY MASS INDEX: 39.16 KG/M2 | WEIGHT: 221 LBS | HEIGHT: 63 IN

## 2020-08-19 DIAGNOSIS — N28.81 HYPERTROPHY OF KIDNEY: ICD-10-CM

## 2020-08-19 DIAGNOSIS — Z86.79 PERSONAL HISTORY OF OTHER DISEASES OF THE CIRCULATORY SYSTEM: ICD-10-CM

## 2020-08-19 DIAGNOSIS — I72.9 ANEURYSM OF UNSPECIFIED SITE: Chronic | ICD-10-CM

## 2020-08-19 DIAGNOSIS — Z23 ENCOUNTER FOR IMMUNIZATION: ICD-10-CM

## 2020-08-19 DIAGNOSIS — Z98.62 PERIPHERAL VASCULAR ANGIOPLASTY STATUS: Chronic | ICD-10-CM

## 2020-08-19 DIAGNOSIS — Z98.890 OTHER SPECIFIED POSTPROCEDURAL STATES: Chronic | ICD-10-CM

## 2020-08-19 PROCEDURE — 99214 OFFICE O/P EST MOD 30 MIN: CPT | Mod: GC

## 2020-08-19 PROCEDURE — 99406 BEHAV CHNG SMOKING 3-10 MIN: CPT

## 2020-08-19 NOTE — COUNSELING
[Risk of tobacco use and health benefits of smoking cessation discussed] : Risk of tobacco use and health benefits of smoking cessation discussed [Cessation strategies including cessation program discussed] : Cessation strategies including cessation program discussed [Potential consequences of obesity discussed] : Potential consequences of obesity discussed [Tobacco Use Cessation Intermediate Greater Than 3 Minutes Up to 10 Minutes] : Tobacco Use Cessation Intermediate Greater Than 3 Minutes Up to 10 Minutes [Benefits of weight loss discussed] : Benefits of weight loss discussed [Encouraged to maintain food diary] : Encouraged to maintain food diary [Encouraged to increase physical activity] : Encouraged to increase physical activity [Encouraged to use exercise tracking device] : Encouraged to use exercise tracking device [Good understanding] : Patient has a good understanding of lifestyle changes and steps needed to achieve self management goal [None] : None [Willing to Quit Smoking] : Not willing to quit smoking [FreeTextEntry1] : 7

## 2020-08-19 NOTE — ASSESSMENT
[FreeTextEntry1] : 55 year old female patient with extensive cardiac history (followed by Dr. Weber)  here for followup / chiropractor referral \par \par No active complaints .

## 2020-08-19 NOTE — PHYSICAL EXAM
[Declined Rectal Exam] : declined rectal exam [Declined Breast Exam] : declined breast exam  [No Focal Deficits] : no focal deficits [Normal] : affect was normal and insight and judgment were intact [No JVD] : no jugular venous distention [No Lymphadenopathy] : no lymphadenopathy

## 2020-08-19 NOTE — END OF VISIT
[Time Spent: ___ minutes] : I have spent [unfilled] minutes of time on the encounter. [>50% of the face to face encounter time was spent on counseling and/or coordination of care for ___] : Greater than 50% of the face to face encounter time was spent on counseling and/or coordination of care for [unfilled] [] : Resident [FreeTextEntry3] : I personally discussed this patient with the resident at the time of the visit.  And I was present with the resident during the key portions of the history and exam.  I agree with the assessment and plan as written, unless noted below.\par \par Pt. has not been compliant with follow up, last seen medically by Dr. Mckeon 2018.  Pt. c/o low back pain occasionally radiate down left leg.  Pt. requested chiropractic referral.  On exam, pt. has no paraspinal spasm, point tenderness on L4 spine.  Will refer pt. to neuro. for evaluation, and Xray of LS spine.  Pt. in the past has had abd/pelvic CT and abd sono showed enlarged right kidney; and C-spine CT showed multiple left thyroid nodule.  Will order renal sono, and thyroid sono, and blood work included TSH, T4, T3.  Pt. also needs to follow up with Ob/Gyn.  Pt. denies Tdap, screening mammogram, and colorectal cancer screening.  Follow up visit in 3 months

## 2020-08-19 NOTE — PLAN
[FreeTextEntry1] : 55 y.o female with history of Cardiomyopathy , HLD, Hypertension , History of transient cerebral ischemia , and history of lower back pain comes to clinic for referral \par \par #) Lower back pain\par -lumbosacral back pain radiating to L.  lateral leg \par - Back pain causing trouble bending down and walking more than a few minutes. \par - ordered lumbosacral xray \par - Refer to neurologist \par  \par #) HTN/DLD\par - Continue with home medications\par - patient counselled on weight loss and diet \par - patient has seen Dr. Weber who started her on norvasc , she has not picked up the medication yet. \par - patient states that Dr. Weber did an EKG on her on Monday 8/17/20 \par \par #) Smoking \par - Counselled \par \par #) Multiple L. thyroid nodules\par - US of thyroid\par  -TSH/T3/T4 ordered \par \par #) Enlarged kidney noted on CT abdomen and US abdomen in 2017/2018 \par -BUN/creatinine steadily decreasing since 2016/2017 \par -Repeat BMP ordered\par -FU renal US ordered \par \par #) HCM \par -refer to OBGYN for pap smear/ maintenance exam \par - patient denied colonoscopy\par -patient denied Tdap \par patient denied mammography\par \par This patient seen and this note documented by Hodan Aguirre MD PGY-1

## 2020-08-19 NOTE — HISTORY OF PRESENT ILLNESS
[FreeTextEntry1] : Chiropractic Referral [de-identified] : 55 year old female patient (followed by Dr. Weber for cardio)  here for  chiropractor referral

## 2020-08-20 DIAGNOSIS — E04.1 NONTOXIC SINGLE THYROID NODULE: ICD-10-CM

## 2020-08-20 DIAGNOSIS — M54.5 LOW BACK PAIN: ICD-10-CM

## 2020-08-20 DIAGNOSIS — N28.81 HYPERTROPHY OF KIDNEY: ICD-10-CM

## 2020-08-20 DIAGNOSIS — I10 ESSENTIAL (PRIMARY) HYPERTENSION: ICD-10-CM

## 2020-08-20 DIAGNOSIS — Z00.00 ENCOUNTER FOR GENERAL ADULT MEDICAL EXAMINATION WITHOUT ABNORMAL FINDINGS: ICD-10-CM

## 2020-09-02 ENCOUNTER — RESULT REVIEW (OUTPATIENT)
Age: 55
End: 2020-09-02

## 2020-09-02 ENCOUNTER — OUTPATIENT (OUTPATIENT)
Dept: OUTPATIENT SERVICES | Facility: HOSPITAL | Age: 55
LOS: 1 days | Discharge: HOME | End: 2020-09-02
Payer: MEDICAID

## 2020-09-02 DIAGNOSIS — N28.81 HYPERTROPHY OF KIDNEY: ICD-10-CM

## 2020-09-02 DIAGNOSIS — I72.9 ANEURYSM OF UNSPECIFIED SITE: Chronic | ICD-10-CM

## 2020-09-02 DIAGNOSIS — Z98.890 OTHER SPECIFIED POSTPROCEDURAL STATES: Chronic | ICD-10-CM

## 2020-09-02 DIAGNOSIS — E04.1 NONTOXIC SINGLE THYROID NODULE: ICD-10-CM

## 2020-09-02 DIAGNOSIS — Z98.62 PERIPHERAL VASCULAR ANGIOPLASTY STATUS: Chronic | ICD-10-CM

## 2020-09-02 PROCEDURE — 76775 US EXAM ABDO BACK WALL LIM: CPT | Mod: 26

## 2020-09-02 PROCEDURE — 76536 US EXAM OF HEAD AND NECK: CPT | Mod: 26

## 2020-09-02 PROCEDURE — 72100 X-RAY EXAM L-S SPINE 2/3 VWS: CPT | Mod: 26

## 2020-11-19 ENCOUNTER — APPOINTMENT (OUTPATIENT)
Dept: INTERNAL MEDICINE | Facility: CLINIC | Age: 55
End: 2020-11-19
Payer: MEDICAID

## 2020-11-19 ENCOUNTER — OUTPATIENT (OUTPATIENT)
Dept: OUTPATIENT SERVICES | Facility: HOSPITAL | Age: 55
LOS: 1 days | Discharge: HOME | End: 2020-11-19

## 2020-11-19 VITALS
BODY MASS INDEX: 38.62 KG/M2 | DIASTOLIC BLOOD PRESSURE: 90 MMHG | WEIGHT: 218 LBS | OXYGEN SATURATION: 99 % | TEMPERATURE: 98.6 F | HEART RATE: 80 BPM | SYSTOLIC BLOOD PRESSURE: 130 MMHG | HEIGHT: 63 IN

## 2020-11-19 DIAGNOSIS — Z98.62 PERIPHERAL VASCULAR ANGIOPLASTY STATUS: Chronic | ICD-10-CM

## 2020-11-19 DIAGNOSIS — E78.5 HYPERLIPIDEMIA, UNSPECIFIED: ICD-10-CM

## 2020-11-19 DIAGNOSIS — I72.9 ANEURYSM OF UNSPECIFIED SITE: Chronic | ICD-10-CM

## 2020-11-19 DIAGNOSIS — I10 ESSENTIAL (PRIMARY) HYPERTENSION: ICD-10-CM

## 2020-11-19 DIAGNOSIS — Z00.00 ENCOUNTER FOR GENERAL ADULT MEDICAL EXAMINATION WITHOUT ABNORMAL FINDINGS: ICD-10-CM

## 2020-11-19 DIAGNOSIS — M54.5 LOW BACK PAIN: ICD-10-CM

## 2020-11-19 DIAGNOSIS — Z98.890 OTHER SPECIFIED POSTPROCEDURAL STATES: Chronic | ICD-10-CM

## 2020-11-19 DIAGNOSIS — E04.1 NONTOXIC SINGLE THYROID NODULE: ICD-10-CM

## 2020-11-19 DIAGNOSIS — I42.9 CARDIOMYOPATHY, UNSPECIFIED: ICD-10-CM

## 2020-11-19 PROCEDURE — 99213 OFFICE O/P EST LOW 20 MIN: CPT | Mod: GC

## 2020-11-19 NOTE — PHYSICAL EXAM
[No Acute Distress] : no acute distress [Well Nourished] : well nourished [Well Developed] : well developed [No Respiratory Distress] : no respiratory distress  [No Accessory Muscle Use] : no accessory muscle use [Clear to Auscultation] : lungs were clear to auscultation bilaterally [Normal Rate] : normal rate  [Regular Rhythm] : with a regular rhythm [Normal S1, S2] : normal S1 and S2 [Soft] : abdomen soft [Non Tender] : non-tender [Non-distended] : non-distended [No CVA Tenderness] : no CVA  tenderness [No Spinal Tenderness] : no spinal tenderness [de-identified] : Pt. reports back pain no pain elicited on exam

## 2020-11-19 NOTE — HISTORY OF PRESENT ILLNESS
[de-identified] : 55 hx of CAD, cardiac cath complicated by left femoral [id number] s/p open repair with plasty, s/p rupture of plasty, shock, renal failure and and eventual  left iliofemoral vein bypass graft on 01/31/18, HTN, active smoker, who presents for follow up with persistent back pain.\par Back pain lumbar region at midline no radiation has been persistent for months, no bladder, bowel incontinence, no change in sensation. \par At last appointment  Xray of Lumbar Spine ordered which showed grade 1 anterolisthesis of L4-L5 and trace retrolisthesis of L2 on L3. Pt. also with degenerative changes of lumbar spine, sacroiliac joints, hips. Pt. was referred to neurology although she is again requesting a chiropractor referral.\par Pt. still smokes half pack daily refusing to quit.\par Of note US ordered from last visit for follow up on thyroid nodule -found to have heterogeneous thyroid glands with innumerable colloid cyst. recommended f/u US in one year.

## 2020-11-19 NOTE — REVIEW OF SYSTEMS
[Back Pain] : back pain [Negative] : Cardiovascular [Fever] : no fever [Chills] : no chills [Night Sweats] : no night sweats [Chest Pain] : no chest pain [Palpitations] : no palpitations [Orthopnea] : no orthopnea [Shortness Of Breath] : no shortness of breath [Wheezing] : no wheezing [Cough] : no cough [Abdominal Pain] : no abdominal pain [Nausea] : no nausea [Vomiting] : no vomiting [Joint Stiffness] : no joint stiffness [FreeTextEntry9] : lower midline back pain

## 2020-11-19 NOTE — ASSESSMENT
[FreeTextEntry1] : 55 hx of CAD, cardiac cath complicated by left femoral [id number] s/p open repair with plasty, s/p rupture of plasty, shock, renal failure and and eventual  left iliofemoral vein bypass graft on 01/31/18, HTN, active smoker, who presents for follow up with persistent back pain.\par \par #) Lower back pain lumbosacral back pain\par - Back pain persisting past few months no red flag signs\par -lumbosacral xray 9-20 showed-grade 1 anterolisthesis of L4-L5 and trace retrolisthesis of L2 on L3. Pt. also with degenerative changes of lumbar spine, sacroiliac joints, hips\par - was referred to neurologist -appt.  is next month\par -will refer to chiropractor\par  \par #) HTN/DLD\par - Continue with home medications- Pt. has refills\par - patient counselled on weight loss and diet \par - patient sees Dr. Weber \par \par #) Smoking \par - Counselled -Pt. refuses to quit currently smokes half pack daily\par \par #) Multiple L. thyroid nodules\par - US of thyroid- 9-20 -found to have heterogeneous thyroid glands with innumerable colloid cyst. recommended f/u US in one year.\par  -TSH/T3/T4 ordered - blood work not done will reorder\par \par #) Enlarged kidney noted on CT abdomen and US abdomen in 2017/2018 \par -Renal US  9-20 normal\par -BUN/creatinine steadily decreasing since 2016/2017 \par -Repeat BMP ordered was not dine will reorder blood work\par \par #) HCM \par -Has  OBGYN appt. for pap smear/ maintenance exam \par - patient denied colonoscopy\par -denies flu vaccine\par -patient denied Tdap \par patient denied mammography\par

## 2020-11-19 NOTE — END OF VISIT
[] : Resident [FreeTextEntry3] : Pt. has back pain, pt. requests chiropractor.  Pt. had Xray LS spine showed DJD of LS spine.  Trial of voltaren gel.  Pt. did not complete blood work ordered, will reorder blood work.  Pt. refused flu vaccine.  Refused mammogram, and GI referral for colonoscopy.  Pt. has appointment with Ob/Gyn 12/17/20.  Follow up visit in 3 months

## 2020-12-08 ENCOUNTER — OUTPATIENT (OUTPATIENT)
Dept: OUTPATIENT SERVICES | Facility: HOSPITAL | Age: 55
LOS: 1 days | Discharge: HOME | End: 2020-12-08

## 2020-12-08 ENCOUNTER — APPOINTMENT (OUTPATIENT)
Dept: NEUROLOGY | Facility: CLINIC | Age: 55
End: 2020-12-08
Payer: MEDICAID

## 2020-12-08 VITALS
WEIGHT: 218 LBS | BODY MASS INDEX: 38.62 KG/M2 | HEIGHT: 63 IN | SYSTOLIC BLOOD PRESSURE: 139 MMHG | DIASTOLIC BLOOD PRESSURE: 86 MMHG | TEMPERATURE: 98.1 F | HEART RATE: 71 BPM

## 2020-12-08 DIAGNOSIS — I72.9 ANEURYSM OF UNSPECIFIED SITE: Chronic | ICD-10-CM

## 2020-12-08 DIAGNOSIS — G89.29 LUMBAGO WITH SCIATICA, RIGHT SIDE: ICD-10-CM

## 2020-12-08 DIAGNOSIS — F17.200 NICOTINE DEPENDENCE, UNSPECIFIED, UNCOMPLICATED: ICD-10-CM

## 2020-12-08 DIAGNOSIS — M54.41 LUMBAGO WITH SCIATICA, RIGHT SIDE: ICD-10-CM

## 2020-12-08 DIAGNOSIS — Z98.890 OTHER SPECIFIED POSTPROCEDURAL STATES: Chronic | ICD-10-CM

## 2020-12-08 DIAGNOSIS — Z98.62 PERIPHERAL VASCULAR ANGIOPLASTY STATUS: Chronic | ICD-10-CM

## 2020-12-08 PROCEDURE — 99203 OFFICE O/P NEW LOW 30 MIN: CPT

## 2020-12-08 NOTE — REVIEW OF SYSTEMS
[Fever] : no fever [Chills] : no chills [Leg Weakness] : no leg weakness [Poor Coordination] : good coordination [Numbness] : no numbness [Tingling] : no tingling [Difficulty Walking] : no difficulty walking [Inability to Walk] : able to walk [Ataxia] : no ataxia [Frequent Falls] : not falling [Limping] : not limping [Chest Pain] : no chest pain [Shortness Of Breath] : no shortness of breath [Constipation] : no constipation [Diarrhea] : no diarrhea [Incontinence] : no incontinence [FreeTextEntry9] : Lumbar back pain

## 2020-12-08 NOTE — ASSESSMENT
[FreeTextEntry1] : 55 hx of CAD, cardiac cath complicated by left femoral [id number] s/p open repair with plasty, s/p rupture of plasty, shock, renal failure requiring dialysis and and eventual left iliofemoral vein bypass graft on 01/31/18 (~5-6 month hospital stay), HTN, active smoker, who presents with persistent back pain x2 years.\par \par #Chronic low back pain\par - Lumbosacral xray 9/20: grade 1 anterolisthesis of L4-L5 and trace retrolisthesis of L2 on L3, degenerative changes of lumbar spine, sacroiliac joints, hips.\par - Right sided sciatica, no bowel/bladder incontinence, weakness, or changes in sensation.\par - Will obtain MRI lumbar spine.\par - Robaxin 500 mg TID PRN.\par - Physical therapy referral.\par \par \par RTC in 3 months.

## 2020-12-08 NOTE — PHYSICAL EXAM
[General Appearance - Alert] : alert [General Appearance - In No Acute Distress] : in no acute distress [Oriented To Time, Place, And Person] : oriented to person, place, and time [Person] : oriented to person [Place] : oriented to place [Time] : oriented to time [Cranial Nerves Oculomotor (III)] : extraocular motion intact [Cranial Nerves Trigeminal (V)] : facial sensation intact symmetrically [Cranial Nerves Facial (VII)] : face symmetrical [Cranial Nerves Vestibulocochlear (VIII)] : hearing was intact bilaterally [Cranial Nerves Accessory (XI - Cranial And Spinal)] : head turning and shoulder shrug symmetric [Motor Tone] : muscle tone was normal in all four extremities [Motor Strength] : muscle strength was normal in all four extremities [Involuntary Movements] : no involuntary movements were seen [No Muscle Atrophy] : normal bulk in all four extremities [Balance] : balance was intact [Sclera] : the sclera and conjunctiva were normal [Hearing Threshold Finger Rub Not Stevens] : hearing was normal [Neck Appearance] : the appearance of the neck was normal [Respiration, Rhythm And Depth] : normal respiratory rhythm and effort [Auscultation Breath Sounds / Voice Sounds] : lungs were clear to auscultation bilaterally [Heart Sounds] : normal S1 and S2 [Abdomen Tenderness] : non-tender [Abnormal Walk] : normal gait [FreeTextEntry1] : Lower back tenderness to palpation, no paraspinal muscle tenderness

## 2021-01-14 ENCOUNTER — APPOINTMENT (OUTPATIENT)
Dept: OBGYN | Facility: CLINIC | Age: 56
End: 2021-01-14

## 2021-02-16 ENCOUNTER — OUTPATIENT (OUTPATIENT)
Dept: OUTPATIENT SERVICES | Facility: HOSPITAL | Age: 56
LOS: 1 days | Discharge: HOME | End: 2021-02-16

## 2021-02-16 DIAGNOSIS — Z98.890 OTHER SPECIFIED POSTPROCEDURAL STATES: Chronic | ICD-10-CM

## 2021-02-16 DIAGNOSIS — M54.89 OTHER DORSALGIA: ICD-10-CM

## 2021-02-16 DIAGNOSIS — I72.9 ANEURYSM OF UNSPECIFIED SITE: Chronic | ICD-10-CM

## 2021-02-16 DIAGNOSIS — Z98.62 PERIPHERAL VASCULAR ANGIOPLASTY STATUS: Chronic | ICD-10-CM

## 2021-04-08 ENCOUNTER — APPOINTMENT (OUTPATIENT)
Dept: CARDIOLOGY | Facility: CLINIC | Age: 56
End: 2021-04-08

## 2021-04-21 ENCOUNTER — APPOINTMENT (OUTPATIENT)
Dept: INTERNAL MEDICINE | Facility: CLINIC | Age: 56
End: 2021-04-21

## 2021-04-26 ENCOUNTER — OUTPATIENT (OUTPATIENT)
Dept: OUTPATIENT SERVICES | Facility: HOSPITAL | Age: 56
LOS: 1 days | Discharge: HOME | End: 2021-04-26
Payer: MEDICAID

## 2021-04-26 ENCOUNTER — RESULT REVIEW (OUTPATIENT)
Age: 56
End: 2021-04-26

## 2021-04-26 DIAGNOSIS — M54.41 LUMBAGO WITH SCIATICA, RIGHT SIDE: ICD-10-CM

## 2021-04-26 DIAGNOSIS — I72.9 ANEURYSM OF UNSPECIFIED SITE: Chronic | ICD-10-CM

## 2021-04-26 DIAGNOSIS — Z98.890 OTHER SPECIFIED POSTPROCEDURAL STATES: Chronic | ICD-10-CM

## 2021-04-26 DIAGNOSIS — Z98.62 PERIPHERAL VASCULAR ANGIOPLASTY STATUS: Chronic | ICD-10-CM

## 2021-04-26 PROCEDURE — 72148 MRI LUMBAR SPINE W/O DYE: CPT | Mod: 26

## 2021-05-28 ENCOUNTER — APPOINTMENT (OUTPATIENT)
Dept: CARDIOLOGY | Facility: CLINIC | Age: 56
End: 2021-05-28
Payer: MEDICAID

## 2021-05-28 ENCOUNTER — RESULT CHARGE (OUTPATIENT)
Age: 56
End: 2021-05-28

## 2021-05-28 VITALS
BODY MASS INDEX: 39.51 KG/M2 | HEIGHT: 63 IN | WEIGHT: 223 LBS | DIASTOLIC BLOOD PRESSURE: 90 MMHG | TEMPERATURE: 97 F | SYSTOLIC BLOOD PRESSURE: 140 MMHG | HEART RATE: 69 BPM

## 2021-05-28 PROCEDURE — 93000 ELECTROCARDIOGRAM COMPLETE: CPT

## 2021-05-28 PROCEDURE — 99214 OFFICE O/P EST MOD 30 MIN: CPT

## 2021-05-28 PROCEDURE — 99072 ADDL SUPL MATRL&STAF TM PHE: CPT

## 2021-05-28 NOTE — REASON FOR VISIT
[Follow-Up - Clinic] : a clinic follow-up of [Cardiomyopathy] : cardiomyopathy [Coronary Artery Disease] : coronary artery disease [Hypertension] : hypertension [Mitral Regurgitation] : mitral regurgitation [FreeTextEntry1] : Feels well.\par \par Back and shoulder pain.  Disk disease.  Seeing neurology.\par \par No exercise.  Still smoking.  Weight stable.\par \par Walking limited by back pain.  No angina.  No claudication.\par \par Breathing comfortable.\par \par Did not get labs.

## 2021-05-28 NOTE — DISCUSSION/SUMMARY
[FreeTextEntry1] : Cont ASA and Lipitor.\par Cont Toprol, Lisinopril, and Norvasc.\par Weight loss / smoking cessation.\par Labs (instructed to go today).\par PMD specialist follow-up.\par Follow-up 6-months.

## 2021-05-28 NOTE — ASSESSMENT
[FreeTextEntry1] : CAD s/p PCI RCA.\par Residual LAD stenosis (small vessel / asymptomatic).\par \par Mixed cardiomyopathy (ischemic / valvular / stress post CVA).\par Subsequent improvement in LVSF and MR.\par No clinical CHF.\par \par Borderline BP control.\par \par Ruptured left CFA pseudoaneurysm s/p patch angioplasty.\par Disrupted patch angioplasty s/p iliofemoral PTFE bypass.\par Ruptured distal anastomosis s/p iliofemoral vein bypass.

## 2021-07-12 ENCOUNTER — APPOINTMENT (OUTPATIENT)
Dept: INTERNAL MEDICINE | Facility: CLINIC | Age: 56
End: 2021-07-12
Payer: MEDICAID

## 2021-07-12 ENCOUNTER — NON-APPOINTMENT (OUTPATIENT)
Age: 56
End: 2021-07-12

## 2021-07-12 ENCOUNTER — OUTPATIENT (OUTPATIENT)
Dept: OUTPATIENT SERVICES | Facility: HOSPITAL | Age: 56
LOS: 1 days | Discharge: HOME | End: 2021-07-12

## 2021-07-12 VITALS
HEART RATE: 81 BPM | TEMPERATURE: 97.9 F | SYSTOLIC BLOOD PRESSURE: 134 MMHG | BODY MASS INDEX: 39.48 KG/M2 | DIASTOLIC BLOOD PRESSURE: 87 MMHG | HEIGHT: 63 IN | WEIGHT: 222.8 LBS | OXYGEN SATURATION: 97 %

## 2021-07-12 DIAGNOSIS — I72.9 ANEURYSM OF UNSPECIFIED SITE: Chronic | ICD-10-CM

## 2021-07-12 DIAGNOSIS — I70.213 ATHEROSCLEROSIS OF NATIVE ARTERIES OF EXTREMITIES WITH INTERMITTENT CLAUDICATION, BILATERAL LEGS: ICD-10-CM

## 2021-07-12 DIAGNOSIS — Z98.62 PERIPHERAL VASCULAR ANGIOPLASTY STATUS: Chronic | ICD-10-CM

## 2021-07-12 DIAGNOSIS — Z98.890 OTHER SPECIFIED POSTPROCEDURAL STATES: Chronic | ICD-10-CM

## 2021-07-12 DIAGNOSIS — I72.4 ANEURYSM OF ARTERY OF LOWER EXTREMITY: ICD-10-CM

## 2021-07-12 PROCEDURE — 99213 OFFICE O/P EST LOW 20 MIN: CPT | Mod: GC

## 2021-07-12 NOTE — HISTORY OF PRESENT ILLNESS
[FreeTextEntry1] : routine follow up  [de-identified] : 55 hx of CAD s/p PCI to RCA, cardiac cath complicated Ruptured left CFA pseudoaneurysm s/p patch angioplasty(2017), Disrupted patch angioplasty s/p iliofemoral PTFE bypass, Ruptured distal anastomosis s/p iliofemoral vein bypass. CMP, HTN, chronic back pain , active smoker, who presents for routine follow up. denies any complaints. Mentions she has MRI left shoulder scheduled with ortho at [facility]\par

## 2021-07-12 NOTE — ASSESSMENT
[FreeTextEntry1] : 55 hx of CAD s/p PCI to RCA, cardiac cath complicated Ruptured left CFA pseudoaneurysm s/p patch angioplasty(2017), Disrupted patch angioplasty s/p iliofemoral PTFE bypass, Ruptured distal anastomosis s/p iliofemoral vein bypass. CMP, HTN, chronic back pain , active smoker, who presents for follow up with persistent back pain.\par \par #CAD s/p PCR to RCA - stable\par #Residual LAD stenosis (small vessel / asymptomatic).\par #Mixed cardiomyopathy (ischemic / valvular / stress post CVA).\par Subsequent improvement in LVSF and MR.\par - no clinical HF\par - recently has seen Dr. Weber \par - c/w ASA, lipitor, toprol, norvasc\par - lipid profile: T chol: 112 (139: 2019)  LDL:42 (67: 2019)  HDL: 35 (47: 2019)  T (, )\par - smoking cessation advised\par \par \par #Chronic low back pain- likely due to lumbar spinal stenosis\par - Lumbosacral xray : grade 1 anterolisthesis of L4-L5 and trace retrolisthesis of L2 on L3, degenerative changes of lumbar spine, sacroiliac joints, hips.\par - Right sided sciatica, no bowel/bladder incontinence, weakness, or changes in sensation.\par - MRI lumbar spine: multi level lumbar foraminal stenosis\par - Robaxin 500 mg TID PRN.\par - Physical therapy referral was given \par \par #) HTN/DLD\par - Continue with home medications- Pt. has refills\par - patient counselled on weight loss and diet \par - patient sees Dr. Weber \par \par #) Smoking \par - Counselled -Pt. refuses to quit currently smokes half pack daily\par \par #) Multiple L. thyroid nodules\par - US of thyroid-  -found to have heterogeneous thyroid glands with innumerable colloid cyst. recommended f/u US in one year.\par  -TSH/T3/T4 ordered - blood work not done will reorder\par \par #) Enlarged kidney noted on CT abdomen and US abdomen in  \par -Renal US  normal\par -BUN/creatinine : 21/1.6 (may,2021)steadily decreasing since  \par \par #) HCM \par - Has OBGYN appt. for pap smear/ maintenance exam \par - patient denied colonoscopy\par - patient denied mammography\par - RTC in 3 months and prn \par - ordered labs for next visit\par

## 2021-07-12 NOTE — PHYSICAL EXAM
[No Acute Distress] : no acute distress [Well Nourished] : well nourished [Normal Sclera/Conjunctiva] : normal sclera/conjunctiva [Normal Outer Ear/Nose] : the outer ears and nose were normal in appearance [No JVD] : no jugular venous distention [No Respiratory Distress] : no respiratory distress  [Normal Rate] : normal rate  [No Carotid Bruits] : no carotid bruits [Soft] : abdomen soft [No HSM] : no HSM [No Rash] : no rash [Normal Gait] : normal gait [Normal Affect] : the affect was normal

## 2021-07-12 NOTE — END OF VISIT
[] : Resident [FreeTextEntry3] : Pt. here for follow up.  Pt. denies any medication renewal since she still has her medication.  Follow cardiology and neurology as scheduled.  Pt. did not complete blood work ordered 11/2020, will reorder.  RTC 3 months with blood work prior to next visit

## 2021-07-14 DIAGNOSIS — I25.10 ATHEROSCLEROTIC HEART DISEASE OF NATIVE CORONARY ARTERY WITHOUT ANGINA PECTORIS: ICD-10-CM

## 2021-07-14 DIAGNOSIS — E78.5 HYPERLIPIDEMIA, UNSPECIFIED: ICD-10-CM

## 2021-07-14 DIAGNOSIS — I72.4 ANEURYSM OF ARTERY OF LOWER EXTREMITY: ICD-10-CM

## 2021-07-14 DIAGNOSIS — I42.9 CARDIOMYOPATHY, UNSPECIFIED: ICD-10-CM

## 2021-07-14 DIAGNOSIS — M54.5 LOW BACK PAIN: ICD-10-CM

## 2021-07-14 DIAGNOSIS — I70.213 ATHEROSCLEROSIS OF NATIVE ARTERIES OF EXTREMITIES WITH INTERMITTENT CLAUDICATION, BILATERAL LEGS: ICD-10-CM

## 2021-07-14 DIAGNOSIS — Z00.00 ENCOUNTER FOR GENERAL ADULT MEDICAL EXAMINATION WITHOUT ABNORMAL FINDINGS: ICD-10-CM

## 2021-07-14 DIAGNOSIS — I10 ESSENTIAL (PRIMARY) HYPERTENSION: ICD-10-CM

## 2021-10-11 ENCOUNTER — RX RENEWAL (OUTPATIENT)
Age: 56
End: 2021-10-11

## 2021-12-09 ENCOUNTER — APPOINTMENT (OUTPATIENT)
Dept: CARDIOLOGY | Facility: CLINIC | Age: 56
End: 2021-12-09

## 2021-12-30 ENCOUNTER — RESULT CHARGE (OUTPATIENT)
Age: 56
End: 2021-12-30

## 2021-12-30 ENCOUNTER — APPOINTMENT (OUTPATIENT)
Dept: CARDIOLOGY | Facility: CLINIC | Age: 56
End: 2021-12-30
Payer: MEDICAID

## 2021-12-30 VITALS
HEART RATE: 72 BPM | SYSTOLIC BLOOD PRESSURE: 140 MMHG | DIASTOLIC BLOOD PRESSURE: 90 MMHG | BODY MASS INDEX: 39.16 KG/M2 | HEIGHT: 63 IN | WEIGHT: 221 LBS

## 2021-12-30 DIAGNOSIS — G47.33 OBSTRUCTIVE SLEEP APNEA (ADULT) (PEDIATRIC): ICD-10-CM

## 2021-12-30 LAB
ALBUMIN SERPL ELPH-MCNC: 4.3 G/DL
ALP BLD-CCNC: 114 U/L
ALT SERPL-CCNC: 28 U/L
ANION GAP SERPL CALC-SCNC: 13 MMOL/L
AST SERPL-CCNC: 21 U/L
BASOPHILS # BLD AUTO: 0.06 K/UL
BASOPHILS NFR BLD AUTO: 0.6 %
BILIRUB SERPL-MCNC: 0.3 MG/DL
BUN SERPL-MCNC: 21 MG/DL
CALCIUM SERPL-MCNC: 9.7 MG/DL
CHLORIDE SERPL-SCNC: 101 MMOL/L
CHOLEST SERPL-MCNC: 112 MG/DL
CO2 SERPL-SCNC: 23 MMOL/L
CREAT SERPL-MCNC: 1.6 MG/DL
EOSINOPHIL # BLD AUTO: 0.21 K/UL
EOSINOPHIL NFR BLD AUTO: 2.2 %
GLUCOSE SERPL-MCNC: 92 MG/DL
HCT VFR BLD CALC: 46.5 %
HDLC SERPL-MCNC: 35 MG/DL
HGB BLD-MCNC: 14.8 G/DL
IMM GRANULOCYTES NFR BLD AUTO: 0.3 %
LDLC SERPL CALC-MCNC: 42 MG/DL
LYMPHOCYTES # BLD AUTO: 1.94 K/UL
LYMPHOCYTES NFR BLD AUTO: 19.9 %
MAN DIFF?: NORMAL
MCHC RBC-ENTMCNC: 26.8 PG
MCHC RBC-ENTMCNC: 31.8 G/DL
MCV RBC AUTO: 84.1 FL
MONOCYTES # BLD AUTO: 0.55 K/UL
MONOCYTES NFR BLD AUTO: 5.6 %
NEUTROPHILS # BLD AUTO: 6.95 K/UL
NEUTROPHILS NFR BLD AUTO: 71.4 %
NONHDLC SERPL-MCNC: 78 MG/DL
PLATELET # BLD AUTO: 315 K/UL
POTASSIUM SERPL-SCNC: 5.3 MMOL/L
PROT SERPL-MCNC: 7.8 G/DL
RBC # BLD: 5.53 M/UL
RBC # FLD: 15 %
SODIUM SERPL-SCNC: 137 MMOL/L
TRIGL SERPL-MCNC: 179 MG/DL
WBC # FLD AUTO: 9.74 K/UL

## 2021-12-30 PROCEDURE — 99214 OFFICE O/P EST MOD 30 MIN: CPT

## 2021-12-30 PROCEDURE — 93000 ELECTROCARDIOGRAM COMPLETE: CPT

## 2021-12-30 RX ORDER — ASPIRIN ENTERIC COATED TABLETS 81 MG 81 MG/1
81 TABLET, DELAYED RELEASE ORAL DAILY
Qty: 90 | Refills: 3 | Status: DISCONTINUED | COMMUNITY
Start: 2019-04-30 | End: 2021-12-30

## 2021-12-30 NOTE — REASON FOR VISIT
[Follow-Up - Clinic] : a clinic follow-up of [Cardiomyopathy] : cardiomyopathy [Coronary Artery Disease] : coronary artery disease [Hypertension] : hypertension [Mitral Regurgitation] : mitral regurgitation [FreeTextEntry1] : Back and shoulder pain.  Disk disease.  Plan for injection.\par \par No exercise.  Still smoking.  Weight stable.\par \par Fatigue.  Unrefreshing sleep.  Daytime somnolence.\par \par No chest pain.  Breathing comfortable.  No palpitations, lightheadedness, syncope.\par \par Labs Reviewed (5/25/21):\par Cr 1.6  K 5.3\par CBC / CMP otherwise unremarkable.\par LDL 42  HDL 78

## 2021-12-30 NOTE — ASSESSMENT
[FreeTextEntry1] : CAD s/p PCI RCA.\par Residual LAD stenosis (small vessel / asymptomatic).\par \par Mixed cardiomyopathy (ischemic / valvular / stress post CVA).\par Subsequent improvement in LVSF and MR.\par No clinical CHF.\par \par BP elevated.\par \par Possible SELINA.\par \par Ruptured left CFA pseudoaneurysm s/p patch angioplasty.\par Disrupted patch angioplasty s/p iliofemoral PTFE bypass.\par Ruptured distal anastomosis s/p iliofemoral vein bypass.

## 2022-03-09 NOTE — PATIENT PROFILE ADULT. - VISION (WITH CORRECTIVE LENSES IF THE PATIENT USUALLY WEARS THEM):
Normal vision: sees adequately in most situations; can see medication labels, newsprint Provider Procedure Text (A): After obtaining clear surgical margins the defect was repaired by another provider.

## 2022-05-10 ENCOUNTER — OUTPATIENT (OUTPATIENT)
Dept: OUTPATIENT SERVICES | Facility: HOSPITAL | Age: 57
LOS: 1 days | Discharge: HOME | End: 2022-05-10

## 2022-05-10 ENCOUNTER — APPOINTMENT (OUTPATIENT)
Dept: NEUROLOGY | Facility: CLINIC | Age: 57
End: 2022-05-10
Payer: MEDICAID

## 2022-05-10 VITALS
BODY MASS INDEX: 39.87 KG/M2 | TEMPERATURE: 98.7 F | HEIGHT: 63 IN | OXYGEN SATURATION: 97 % | HEART RATE: 69 BPM | WEIGHT: 225 LBS | DIASTOLIC BLOOD PRESSURE: 85 MMHG | SYSTOLIC BLOOD PRESSURE: 125 MMHG

## 2022-05-10 DIAGNOSIS — Z98.890 OTHER SPECIFIED POSTPROCEDURAL STATES: Chronic | ICD-10-CM

## 2022-05-10 DIAGNOSIS — M54.50 LOW BACK PAIN, UNSPECIFIED: ICD-10-CM

## 2022-05-10 DIAGNOSIS — I72.9 ANEURYSM OF UNSPECIFIED SITE: Chronic | ICD-10-CM

## 2022-05-10 DIAGNOSIS — Z98.62 PERIPHERAL VASCULAR ANGIOPLASTY STATUS: Chronic | ICD-10-CM

## 2022-05-10 PROCEDURE — 99214 OFFICE O/P EST MOD 30 MIN: CPT | Mod: GC

## 2022-05-10 NOTE — REVIEW OF SYSTEMS
[Tension Headache] : tension-type headaches [Fever] : no fever [Chills] : no chills [Recent Weight Loss (___ Lbs)] : no recent weight loss [Decr. Concentrating Ability] : no decrease in concentrating ability [Leg Weakness] : no leg weakness [Numbness] : no numbness [Tingling] : no tingling [Dizziness] : no dizziness [Fainting] : no fainting [Vertigo] : no vertigo [Loss Of Hearing] : no hearing loss [Joint Pain] : no joint pain

## 2022-05-10 NOTE — PHYSICAL EXAM
[General Appearance - Alert] : alert [General Appearance - Well Nourished] : well nourished [Oriented To Time, Place, And Person] : oriented to person, place, and time [I: Normal Smell] : smell intact bilaterally [Cranial Nerves Optic (II)] : visual acuity intact bilaterally,  visual fields full to confrontation, pupils equal round and reactive to light [Cranial Nerves Oculomotor (III)] : extraocular motion intact [Cranial Nerves Trigeminal (V)] : facial sensation intact symmetrically [Cranial Nerves Facial (VII)] : face symmetrical [Cranial Nerves Vestibulocochlear (VIII)] : hearing was intact bilaterally [Cranial Nerves Glossopharyngeal (IX)] : tongue and palate midline [Cranial Nerves Accessory (XI - Cranial And Spinal)] : head turning and shoulder shrug symmetric [Cranial Nerves Hypoglossal (XII)] : there was no tongue deviation with protrusion [Motor Tone] : muscle tone was normal in all four extremities [Motor Strength] : muscle strength was normal in all four extremities [Motor Handedness Right-Handed] : the patient is right hand dominant [Sensation Tactile Decrease] : light touch was intact [Sensation Pain / Temperature Decrease] : pain and temperature was intact [Sensation Vibration Decrease] : vibration was intact [Proprioception] : proprioception was intact [2+] : Ankle jerk left 2+ [PERRL With Normal Accommodation] : pupils were equal in size, round, reactive to light, with normal accommodation [Extraocular Movements] : extraocular movements were intact [Full Visual Field] : full visual field [Abnormal Walk] : normal gait [Involuntary Movements] : no involuntary movements were seen [Tremor] : no tremor present [Dysdiadochokinesia Bilaterally] : not present [Coordination - Dysmetria Impaired Finger-to-Nose Bilateral] : not present [Coordination - Dysmetria Impaired Heel-to-Shin Bilateral] : not present

## 2022-05-10 NOTE — END OF VISIT
[] : Resident [FreeTextEntry3] : Patient examined, R > L paraspinal tenderness in lumbar spine.  Reviewed L-spine MRI.  Will send to PM&R for bracing and give muscle relaxer.  Told pt and  potential for drowsiness from the methocarbamol and advised not to drive if drowsy.  They verbalized understanding.

## 2022-05-10 NOTE — HISTORY OF PRESENT ILLNESS
[FreeTextEntry1] : The 56 yo F w/ PMHx HTN, HLD, CAD s/p stent and bypass presenting for follow up for lower back pain, which has been ongoing since prior visit 12/2020. The pain is exacerbated by walking and she will have shooting pain into the legs. She states the lower back pain is relieved by sitting or laying down. She states that PT had actually made her back pain worse. She also states that the muscle relaxant did not improve her symptoms. She had a recent fall last Thursday, had tripped over a deck and fell down and had injury to her arm. Has not taken any OTC medications for the pain. It has reached 10/10 pain, bilateral and unable to specify the quality of the pain.\par \par Denies any numbness, tingling, weakness, bowel or bladder incontinence.

## 2022-05-10 NOTE — ASSESSMENT
[FreeTextEntry1] : This is a 56 yo F w/ PMHx of HTN, HLD, CAD s/p stent and bypass presenting for follow up for lower back pain, which has been ongoing since prior visit 12/2020. Patient had X-Ray and MRI lumbar spine done showing L4-L5 anterolisthesis, and back pain has not responded to muscle relaxant or PT. Lower back pain is chronic in nature.\par \par Plan:\par - MRI lumbar spine reviewed, showed L4-L5 anterolisthesis\par - Would trial Robaxin again, was taking 500 mg with no relief. Can try 750 mg starting with 1 tablet per day and can increase up to three tablets per day. Patient stated she stopped after only 4 doses of 500 mg Robaxin previously as she did not think it was helping\par - Instructed on potential side effects of Robaxin including drowsiness. Instructed not to drive or operate motor vehicle if experiencing this side effect\par - Can trial lumbar brace, patient states she has one from chiropractor previously\par - Would refer to PM&R for rehabilitation services and evaluation, has not responded to PT in the past\par - Return to clinic in 3-6 months

## 2022-06-03 ENCOUNTER — APPOINTMENT (OUTPATIENT)
Dept: CARDIOLOGY | Facility: CLINIC | Age: 57
End: 2022-06-03

## 2022-07-01 ENCOUNTER — APPOINTMENT (OUTPATIENT)
Dept: CARDIOLOGY | Facility: CLINIC | Age: 57
End: 2022-07-01

## 2022-07-12 ENCOUNTER — RX RENEWAL (OUTPATIENT)
Age: 57
End: 2022-07-12

## 2022-09-07 ENCOUNTER — APPOINTMENT (OUTPATIENT)
Dept: CARDIOLOGY | Facility: CLINIC | Age: 57
End: 2022-09-07

## 2022-09-07 PROCEDURE — 93306 TTE W/DOPPLER COMPLETE: CPT

## 2022-10-11 ENCOUNTER — APPOINTMENT (OUTPATIENT)
Dept: NEUROLOGY | Facility: CLINIC | Age: 57
End: 2022-10-11

## 2022-11-30 NOTE — DISCUSSION/SUMMARY
Health Call Center    Phone Message    May a detailed message be left on voicemail: yes     Reason for Call: Medication Question or concern regarding medication   Prescription Clarification  Name of Medication: losartan (COZAAR) 25 MG tablet  Prescribing Provider: Dr Leigh   Pharmacy:    What on the order needs clarification? Juliet called and said the pt's held his Losartan yesterday due to low BP and she would like to know if there is a range for her to be aware of as she does not want to have his BP to low?  Please call Juliet back to discuss        Action Taken: Other: Cardiology    Travel Screening: Not Applicable     Thank you!  [facility]                                                                          
Addressed in EP visit 11/25. -[facility] [place]  
[FreeTextEntry1] : Cont ASA and Lipitor.\par Cont Toprol and Lisinopril.\par Increase Norvasc 10mg.\par ECHO\par Pulmonary referral for sleep study / smoking.\par Weight loss / smoking cessation.\par Labs.\par PMD / specialist follow-up.\par Follow-up 6-months.

## 2022-12-05 ENCOUNTER — APPOINTMENT (OUTPATIENT)
Dept: CARDIOLOGY | Facility: CLINIC | Age: 57
End: 2022-12-05

## 2023-01-23 ENCOUNTER — APPOINTMENT (OUTPATIENT)
Dept: CARDIOLOGY | Facility: CLINIC | Age: 58
End: 2023-01-23
Payer: MEDICAID

## 2023-01-23 VITALS
DIASTOLIC BLOOD PRESSURE: 80 MMHG | BODY MASS INDEX: 39.51 KG/M2 | SYSTOLIC BLOOD PRESSURE: 120 MMHG | HEIGHT: 63 IN | TEMPERATURE: 96.9 F | HEART RATE: 64 BPM | WEIGHT: 223 LBS

## 2023-01-23 PROCEDURE — 93000 ELECTROCARDIOGRAM COMPLETE: CPT

## 2023-01-23 PROCEDURE — 99214 OFFICE O/P EST MOD 30 MIN: CPT

## 2023-01-23 RX ORDER — METHOCARBAMOL 500 MG/1
500 TABLET, FILM COATED ORAL
Qty: 90 | Refills: 1 | Status: DISCONTINUED | COMMUNITY
Start: 2020-12-08 | End: 2023-01-23

## 2023-01-23 RX ORDER — METHOCARBAMOL 750 MG/1
750 TABLET, FILM COATED ORAL 3 TIMES DAILY
Qty: 30 | Refills: 0 | Status: DISCONTINUED | COMMUNITY
Start: 2022-05-10 | End: 2023-01-23

## 2023-01-23 NOTE — ASSESSMENT
[FreeTextEntry1] : CAD s/p PCI RCA.\par Residual LAD stenosis (small vessel / asymptomatic).\par \par Mixed cardiomyopathy (ischemic / valvular / stress post CVA).\par Subsequent improvement in LVSF and MR.\par No clinical CHF.\par \par BP controlled.\par \par Possible SELINA.\par \par Ruptured left CFA pseudoaneurysm s/p patch angioplasty.\par Disrupted patch angioplasty s/p iliofemoral PTFE bypass.\par Ruptured distal anastomosis s/p iliofemoral vein bypass.\par \par ECHO (9/7/2022): nL LVSF.  Moderate MR.

## 2023-01-23 NOTE — REASON FOR VISIT
[Follow-Up - Clinic] : a clinic follow-up of [Cardiomyopathy] : cardiomyopathy [Coronary Artery Disease] : coronary artery disease [Hypertension] : hypertension [Mitral Regurgitation] : mitral regurgitation [FreeTextEntry1] : Feels well.\par \par No exercise.  Walks dog.  No exertional symptoms.\par \par No angina.  Breathing comfortable.\par \par Weight stable.\par \par Still smoking.\par \par Tolerating Rx.

## 2023-01-23 NOTE — DISCUSSION/SUMMARY
[FreeTextEntry1] : Cont ASA and Lipitor\par Cont Toprol, Lisinopril, and Norvasc\par Weight loss / smoking cessation\par Labs\par PMD  follow-up.\par Follow-up 6-months

## 2023-07-31 ENCOUNTER — APPOINTMENT (OUTPATIENT)
Dept: CARDIOLOGY | Facility: CLINIC | Age: 58
End: 2023-07-31
Payer: MEDICAID

## 2023-07-31 VITALS
BODY MASS INDEX: 38.8 KG/M2 | HEIGHT: 63 IN | DIASTOLIC BLOOD PRESSURE: 80 MMHG | HEART RATE: 62 BPM | SYSTOLIC BLOOD PRESSURE: 130 MMHG | WEIGHT: 219 LBS

## 2023-07-31 PROCEDURE — 99214 OFFICE O/P EST MOD 30 MIN: CPT

## 2023-07-31 PROCEDURE — 93000 ELECTROCARDIOGRAM COMPLETE: CPT

## 2023-08-06 NOTE — ASSESSMENT
[FreeTextEntry1] : CAD s/p PCI RCA. Residual LAD stenosis (small vessel / asymptomatic).  Mixed cardiomyopathy (ischemic / valvular / stress post CVA). Subsequent improvement in LVSF.  Moderate MR  No clinical CHF.  BP controlled.  Ruptured left CFA pseudoaneurysm s/p patch angioplasty. Disrupted patch angioplasty s/p iliofemoral PTFE bypass. Ruptured distal anastomosis s/p iliofemoral vein bypass.  ECHO (9/7/2022): nL LVSF.  Moderate MR.

## 2023-08-06 NOTE — REASON FOR VISIT
[FreeTextEntry1] : Feels well.  Physically inactive.  No interval cardiac symptoms.  Breathing comfortable.  No claudication.  Weight stable.  Still smoking.  Tolerating Rx. [Follow-Up - Clinic] : a clinic follow-up of [Cardiomyopathy] : cardiomyopathy [Coronary Artery Disease] : coronary artery disease [Hypertension] : hypertension [Mitral Regurgitation] : mitral regurgitation

## 2023-08-06 NOTE — DISCUSSION/SUMMARY
[FreeTextEntry1] : Cont ASA and Lipitor Cont Toprol, Lisinopril, and Norvasc Labs Follow-up 6-months [EKG obtained to assist in diagnosis and management of assessed problem(s)] : EKG obtained to assist in diagnosis and management of assessed problem(s)

## 2023-10-02 ENCOUNTER — APPOINTMENT (OUTPATIENT)
Dept: INTERNAL MEDICINE | Facility: CLINIC | Age: 58
End: 2023-10-02
Payer: MEDICAID

## 2023-10-02 ENCOUNTER — OUTPATIENT (OUTPATIENT)
Dept: OUTPATIENT SERVICES | Facility: HOSPITAL | Age: 58
LOS: 1 days | End: 2023-10-02
Payer: MEDICAID

## 2023-10-02 VITALS
SYSTOLIC BLOOD PRESSURE: 115 MMHG | BODY MASS INDEX: 38.8 KG/M2 | OXYGEN SATURATION: 98 % | TEMPERATURE: 97.7 F | WEIGHT: 219 LBS | HEIGHT: 63 IN | DIASTOLIC BLOOD PRESSURE: 76 MMHG | HEART RATE: 86 BPM

## 2023-10-02 DIAGNOSIS — Z00.00 ENCOUNTER FOR GENERAL ADULT MEDICAL EXAMINATION WITHOUT ABNORMAL FINDINGS: ICD-10-CM

## 2023-10-02 DIAGNOSIS — Z98.890 OTHER SPECIFIED POSTPROCEDURAL STATES: Chronic | ICD-10-CM

## 2023-10-02 DIAGNOSIS — Z98.62 PERIPHERAL VASCULAR ANGIOPLASTY STATUS: Chronic | ICD-10-CM

## 2023-10-02 DIAGNOSIS — I72.9 ANEURYSM OF UNSPECIFIED SITE: Chronic | ICD-10-CM

## 2023-10-02 DIAGNOSIS — Z00.00 ENCOUNTER FOR GENERAL ADULT MEDICAL EXAMINATION W/OUT ABNORMAL FINDINGS: ICD-10-CM

## 2023-10-02 PROCEDURE — 99214 OFFICE O/P EST MOD 30 MIN: CPT

## 2023-10-02 PROCEDURE — 99214 OFFICE O/P EST MOD 30 MIN: CPT | Mod: GC

## 2023-10-02 RX ORDER — DICLOFENAC SODIUM 1% 10 MG/G
1 GEL TOPICAL
Qty: 1 | Refills: 2 | Status: DISCONTINUED | COMMUNITY
Start: 2020-11-19 | End: 2023-10-02

## 2023-10-17 ENCOUNTER — APPOINTMENT (OUTPATIENT)
Dept: PAIN MANAGEMENT | Facility: CLINIC | Age: 58
End: 2023-10-17
Payer: MEDICAID

## 2023-10-17 VITALS — BODY MASS INDEX: 38.8 KG/M2 | HEIGHT: 63 IN | WEIGHT: 219 LBS

## 2023-10-17 DIAGNOSIS — E78.5 HYPERLIPIDEMIA, UNSPECIFIED: ICD-10-CM

## 2023-10-17 DIAGNOSIS — Z00.00 ENCOUNTER FOR GENERAL ADULT MEDICAL EXAMINATION WITHOUT ABNORMAL FINDINGS: ICD-10-CM

## 2023-10-17 DIAGNOSIS — I25.10 ATHEROSCLEROTIC HEART DISEASE OF NATIVE CORONARY ARTERY WITHOUT ANGINA PECTORIS: ICD-10-CM

## 2023-10-17 DIAGNOSIS — M54.9 DORSALGIA, UNSPECIFIED: ICD-10-CM

## 2023-10-17 DIAGNOSIS — I42.9 CARDIOMYOPATHY, UNSPECIFIED: ICD-10-CM

## 2023-10-17 DIAGNOSIS — E04.1 NONTOXIC SINGLE THYROID NODULE: ICD-10-CM

## 2023-10-17 DIAGNOSIS — M46.96 UNSPECIFIED INFLAMMATORY SPONDYLOPATHY, LUMBAR REGION: ICD-10-CM

## 2023-10-17 DIAGNOSIS — I10 ESSENTIAL (PRIMARY) HYPERTENSION: ICD-10-CM

## 2023-10-17 PROCEDURE — 99204 OFFICE O/P NEW MOD 45 MIN: CPT

## 2023-10-18 PROBLEM — M46.96 UNSPECIFIED INFLAMMATORY SPONDYLOPATHY, LUMBAR REGION: Status: ACTIVE | Noted: 2023-10-18

## 2023-10-27 ENCOUNTER — APPOINTMENT (OUTPATIENT)
Dept: PAIN MANAGEMENT | Facility: CLINIC | Age: 58
End: 2023-10-27
Payer: MEDICAID

## 2023-10-27 PROCEDURE — 64494 INJ PARAVERT F JNT L/S 2 LEV: CPT | Mod: 50

## 2023-10-27 PROCEDURE — 64493 INJ PARAVERT F JNT L/S 1 LEV: CPT | Mod: 50

## 2023-10-27 PROCEDURE — 93770 DETERMINATION VENOUS PRESS: CPT | Mod: 59

## 2023-10-27 PROCEDURE — 93040 RHYTHM ECG WITH REPORT: CPT | Mod: 79

## 2023-10-27 PROCEDURE — 00630 ANES PX LUMBAR REGION NOS: CPT | Mod: QZ,P3

## 2023-10-27 PROCEDURE — 94761 N-INVAS EAR/PLS OXIMETRY MLT: CPT | Mod: 59

## 2023-11-03 ENCOUNTER — APPOINTMENT (OUTPATIENT)
Dept: PAIN MANAGEMENT | Facility: CLINIC | Age: 58
End: 2023-11-03

## 2023-11-17 ENCOUNTER — APPOINTMENT (OUTPATIENT)
Dept: PAIN MANAGEMENT | Facility: CLINIC | Age: 58
End: 2023-11-17

## 2023-11-21 ENCOUNTER — APPOINTMENT (OUTPATIENT)
Dept: PAIN MANAGEMENT | Facility: CLINIC | Age: 58
End: 2023-11-21

## 2023-11-28 ENCOUNTER — APPOINTMENT (OUTPATIENT)
Dept: PAIN MANAGEMENT | Facility: CLINIC | Age: 58
End: 2023-11-28
Payer: MEDICAID

## 2023-11-28 ENCOUNTER — APPOINTMENT (OUTPATIENT)
Dept: PAIN MANAGEMENT | Facility: CLINIC | Age: 58
End: 2023-11-28

## 2023-11-28 DIAGNOSIS — M46.97 UNSPECIFIED INFLAMMATORY SPONDYLOPATHY, LUMBOSACRAL REGION: ICD-10-CM

## 2023-11-28 PROCEDURE — 00630 ANES PX LUMBAR REGION NOS: CPT | Mod: QZ,P3

## 2023-11-28 PROCEDURE — 64636Z: CUSTOM

## 2023-11-28 PROCEDURE — 93770 DETERMINATION VENOUS PRESS: CPT

## 2023-11-28 PROCEDURE — 93040 RHYTHM ECG WITH REPORT: CPT | Mod: 79

## 2023-11-28 PROCEDURE — 94761 N-INVAS EAR/PLS OXIMETRY MLT: CPT | Mod: 59

## 2023-11-28 PROCEDURE — 64635 DESTROY LUMB/SAC FACET JNT: CPT | Mod: RT

## 2024-01-11 ENCOUNTER — APPOINTMENT (OUTPATIENT)
Dept: PAIN MANAGEMENT | Facility: CLINIC | Age: 59
End: 2024-01-11
Payer: MEDICAID

## 2024-01-11 DIAGNOSIS — M47.818 SPONDYLOSIS W/OUT MYELOPATHY OR RADICULOPATHY, SACRAL AND SACROCOCCYGEAL REGION: ICD-10-CM

## 2024-01-11 DIAGNOSIS — M47.817 SPONDYLOSIS W/OUT MYELOPATHY OR RADICULOPATHY, LUMBOSACRAL REGION: ICD-10-CM

## 2024-01-11 DIAGNOSIS — M54.50 LOW BACK PAIN, UNSPECIFIED: ICD-10-CM

## 2024-01-11 PROCEDURE — 99214 OFFICE O/P EST MOD 30 MIN: CPT

## 2024-01-11 NOTE — DATA REVIEWED
[FreeTextEntry1] : MRI of the lumbar spine dated 4/26/2021 finds L4-5 grade 1 anterior listhesis, disc bulging and facet arthropathy with moderate foraminal narrowing.  Additional mild degenerative changes as described.  X-ray of the lumbar spine dated 9/02/20 finds no acute osseous abnormality.  Degenerative change as above.  Grade 1 anterolisthesis of L4 on L5.  Trace retrolisthesis of L2 on L3.  SOAPP: moderate risk 10/17/23 Patient has a combination of moderate rise SOAP and no risk factors. UDS would be repeated randomly every quarter.  UDS: No data obtained today.  [place] REGISTRY: Checked.

## 2024-01-11 NOTE — ASSESSMENT
[FreeTextEntry1] : This is a 58-year-old female with complaints of lower back pain. This is a 58-year-old female here to establish care for localized lower back pain.  She has a prior history of lower back pain which has progressed over the years. Patient denies any radicular features into the lower extremities.  She is s/p a B/L L4-S1 MBB w/ mac on 10/27/23 and a RT L4-S1 RFA w/ mac on 11/28/23. The RFA provided her with minimal relief for about an hour. There was no significant relief or improvement. There is evidence of tenderness over the bilateral SI joint. We will proceed with diagnostic B/L SI joint injections w/ mac with the hope it provides her relief of her symptoms. All this patients questions were answered and the conversation was understood well.  Patient had pain on PSIS area, Bassam's positive and pelvic rocking positive. Patient has trialed rehab (Home exercise, physical therapy or chiropractic care) and medications. We will schedule a bilateral diagnostic and therapeutic sacroiliac joint injection. If greater than 50% relief for greater than 30 minutes, would do a second bilateral sacroiliac joint injection in 1-2 weeks. With greater than 50% relief for 6-8 weeks, a bilateral sacroiliac joint injection could be repeated in 3 months vs RFA or referral to neurosurgeon.  The patient has severe anxiety of procedures that necessitates monitored anesthesia care (MAC). The procedure performed will be close to major nerves, arteries, and spinal cord and/or joint structures. Due to the proximity of these structures, we need the patient to be still during the procedure. With the help of MAC, this will be safely achieved and decrease the risk of any complications.  RISK AND BENEFIT PARAGRAPH: Risk, benefits, pros and cons of procedure were explained to the patient using models and diagrams and their questions were answered.  I, Sonya Keith, attest that this documentation has been prepared under the direction and in the presence of Provider Anna Hastings MD.   Thank you for allowing me to assist in the management of this patient.    Best Regards,    Anna Hastings M.D., FAAPMR   Diplomate, American Board of Physical Medicine and Rehabilitation Diplomate, American Board of Pain Medicine  Diplomate, American Board of Pain Management

## 2024-01-11 NOTE — PHYSICAL EXAM
[Normal Coordination] : normal coordination [Normal DTR UE/LE] : normal DTR UE/LE  [Normal Sensation] : normal sensation [Normal Mood and Affect] : normal mood and affect [Orientated] : orientated [Able to Communicate] : able to communicate [Well Developed] : well developed [Well Nourished] : well nourished [Extension] : extension [] : no swelling [FreeTextEntry8] : Tex testing is positive for reproduction of pain at the PSIS, and there is a positive Shabbir Finger test and a positive Gaenslen's test on the right and left.

## 2024-01-11 NOTE — HISTORY OF PRESENT ILLNESS
[FreeTextEntry1] : ORIGINAL PRESENTATION: This is a 58-year-old female here to establish care for localized lower back pain.  She has a prior history of lower back pain which has progressed over the years. Patient denies any radicular features into the lower extremities. Imaging of the lumbar spine from 2021 were reviewed with the patient and are documented below. Patient previously trialed a physical therapy program which exacerbated her pain. She has difficulty ambulating and standing for long periods of time secondary to pain. There is evidence of arthritis in the facet joints which is likely the cause of her pain. We discussed trialing a diagnostic injection therapy in the form of medial branch injections, and she is agreeable to move forward.   The patient has had this pain for 7 years. The pain started after illness, not injury patient describes pain as severe and intermittent. During the last month the pain has been worse in no typical pattern. Pain described as pressure-like. Pain is increased with standing, walking.  ACTIVITIES: Patient could walk less than 1 block before the pain starts. Patient has no pain while sitting. Patient can stand 2 to 3 minutes before pain starts. The patient sometimes lays down because of pain. Patient uses no assistive walking device at this time. Patient has difficulty going to work, perform household chores, doing yard work or shopping, participating vocational activities, exercising at this time.  PRIOR PAIN TREATMENTS: No relief with physical therapy, heat treatment, cold treatment.  PRIOR PAIN MEDICATIONS: Tylenol.  PATIENT PRESENTS FOR FOLLOW UP: She is under our care for localized lower back pain.  She has a prior history of lower back pain which has progressed over the years. Patient denies any radicular features into the lower extremities. Patient was last seen in October and is most recently s/p a B/L L4-S1 MBB w/ mac on 10/27/23 and a RT L4-S1 RFA w/ mac on 11/28/23. She reports that the RFA provided her with minimal relief for about an hour. There was no improvement in functionality or her ability to perform her ADLs. Her pain continues to be localized. She previously trialed and failed physical therapy sessions with no relief. Upon physical examination, there is tenderness over the bilateral SI joints. The option to trial injection therapy to target the SI joint was discussed and she is agreeable.

## 2024-01-26 ENCOUNTER — APPOINTMENT (OUTPATIENT)
Dept: CARDIOLOGY | Facility: CLINIC | Age: 59
End: 2024-01-26
Payer: MEDICAID

## 2024-01-26 VITALS
DIASTOLIC BLOOD PRESSURE: 72 MMHG | BODY MASS INDEX: 38.27 KG/M2 | HEIGHT: 63 IN | HEART RATE: 68 BPM | WEIGHT: 216 LBS | SYSTOLIC BLOOD PRESSURE: 110 MMHG

## 2024-01-26 DIAGNOSIS — I34.0 NONRHEUMATIC MITRAL (VALVE) INSUFFICIENCY: ICD-10-CM

## 2024-01-26 PROCEDURE — 93000 ELECTROCARDIOGRAM COMPLETE: CPT

## 2024-01-26 PROCEDURE — 99214 OFFICE O/P EST MOD 30 MIN: CPT

## 2024-02-09 ENCOUNTER — APPOINTMENT (OUTPATIENT)
Dept: PAIN MANAGEMENT | Facility: CLINIC | Age: 59
End: 2024-02-09

## 2024-02-18 LAB
ALBUMIN SERPL ELPH-MCNC: 4.3 G/DL
ALP BLD-CCNC: 98 U/L
ALT SERPL-CCNC: 25 U/L
ANION GAP SERPL CALC-SCNC: 19 MMOL/L
AST SERPL-CCNC: 23 U/L
BASOPHILS # BLD AUTO: 0.05 K/UL
BASOPHILS NFR BLD AUTO: 0.6 %
BILIRUB SERPL-MCNC: 0.3 MG/DL
BUN SERPL-MCNC: 17 MG/DL
CALCIUM SERPL-MCNC: 9.6 MG/DL
CHLORIDE SERPL-SCNC: 102 MMOL/L
CHOLEST SERPL-MCNC: 126 MG/DL
CO2 SERPL-SCNC: 17 MMOL/L
CREAT SERPL-MCNC: 1.4 MG/DL
EGFR: 44 ML/MIN/1.73M2
EOSINOPHIL # BLD AUTO: 0.23 K/UL
EOSINOPHIL NFR BLD AUTO: 2.8 %
ESTIMATED AVERAGE GLUCOSE: 131 MG/DL
GLUCOSE SERPL-MCNC: 88 MG/DL
HBA1C MFR BLD HPLC: 6.2 %
HCT VFR BLD CALC: 47.2 %
HDLC SERPL-MCNC: 35 MG/DL
HGB BLD-MCNC: 14.8 G/DL
IMM GRANULOCYTES NFR BLD AUTO: 0.4 %
LDLC SERPL CALC-MCNC: 57 MG/DL
LYMPHOCYTES # BLD AUTO: 2.03 K/UL
LYMPHOCYTES NFR BLD AUTO: 24.5 %
MAN DIFF?: NORMAL
MCHC RBC-ENTMCNC: 26 PG
MCHC RBC-ENTMCNC: 31.4 G/DL
MCV RBC AUTO: 83 FL
MONOCYTES # BLD AUTO: 0.58 K/UL
MONOCYTES NFR BLD AUTO: 7 %
NEUTROPHILS # BLD AUTO: 5.38 K/UL
NEUTROPHILS NFR BLD AUTO: 64.7 %
NONHDLC SERPL-MCNC: 91 MG/DL
PLATELET # BLD AUTO: 269 K/UL
POTASSIUM SERPL-SCNC: 5.7 MMOL/L
PROT SERPL-MCNC: 7.6 G/DL
RBC # BLD: 5.69 M/UL
RBC # FLD: 14.3 %
SODIUM SERPL-SCNC: 138 MMOL/L
TRIGL SERPL-MCNC: 169 MG/DL
TSH SERPL-ACNC: 0.86 UIU/ML
WBC # FLD AUTO: 8.3 K/UL

## 2024-02-18 NOTE — REASON FOR VISIT
[Follow-Up - Clinic] : a clinic follow-up of [FreeTextEntry1] : Feels well.  Physically inactive.  No interval cardiac symptoms.  No claudication.  Breathing relatively comfortable.  Arthritis.  Scheduled for pain injection.  Weight stable.  Still smoking.  Tolerating Rx.  Did not get labs.

## 2024-02-18 NOTE — ASSESSMENT
[FreeTextEntry1] : CAD s/p PCI RCA. Residual LAD stenosis (small vessel / asymptomatic).  Mixed cardiomyopathy (ischemic / valvular / stress post CVA). Subsequent improvement in LVSF.  Moderate MR  No clinical CHF.  BP controlled.  Ruptured left CFA pseudoaneurysm s/p patch angioplasty. Disrupted patch angioplasty s/p iliofemoral PTFE bypass. Ruptured distal anastomosis s/p iliofemoral vein bypass.

## 2024-02-18 NOTE — DISCUSSION/SUMMARY
[EKG obtained to assist in diagnosis and management of assessed problem(s)] : EKG obtained to assist in diagnosis and management of assessed problem(s) [FreeTextEntry1] : Cont ASA and Lipitor Cont Toprol, Lisinopril, and Norvasc Labs today (instructed needed to continue prescribing Rx) Follow-up 6-months

## 2024-03-25 RX ORDER — ATORVASTATIN CALCIUM 80 MG/1
80 TABLET, FILM COATED ORAL DAILY
Qty: 90 | Refills: 1 | Status: ACTIVE | COMMUNITY
Start: 2017-08-15 | End: 1900-01-01

## 2024-03-25 RX ORDER — ASPIRIN 81 MG/1
81 TABLET, COATED ORAL
Qty: 90 | Refills: 1 | Status: ACTIVE | COMMUNITY
Start: 2021-10-11 | End: 1900-01-01

## 2024-03-25 RX ORDER — LISINOPRIL 20 MG/1
20 TABLET ORAL DAILY
Qty: 90 | Refills: 1 | Status: ACTIVE | COMMUNITY
Start: 1900-01-01 | End: 1900-01-01

## 2024-03-25 RX ORDER — METOPROLOL SUCCINATE 50 MG/1
50 TABLET, EXTENDED RELEASE ORAL
Qty: 90 | Refills: 1 | Status: ACTIVE | COMMUNITY
Start: 2018-07-24 | End: 1900-01-01

## 2024-03-25 RX ORDER — AMLODIPINE BESYLATE 10 MG/1
10 TABLET ORAL DAILY
Qty: 90 | Refills: 3 | Status: ACTIVE | COMMUNITY
Start: 2020-08-17 | End: 1900-01-01

## 2024-04-01 ENCOUNTER — APPOINTMENT (OUTPATIENT)
Dept: INTERNAL MEDICINE | Facility: CLINIC | Age: 59
End: 2024-04-01

## 2024-04-04 ENCOUNTER — OUTPATIENT (OUTPATIENT)
Dept: OUTPATIENT SERVICES | Facility: HOSPITAL | Age: 59
LOS: 1 days | End: 2024-04-04
Payer: MEDICAID

## 2024-04-04 ENCOUNTER — APPOINTMENT (OUTPATIENT)
Dept: INTERNAL MEDICINE | Facility: CLINIC | Age: 59
End: 2024-04-04
Payer: MEDICAID

## 2024-04-04 VITALS
SYSTOLIC BLOOD PRESSURE: 118 MMHG | HEIGHT: 63 IN | DIASTOLIC BLOOD PRESSURE: 80 MMHG | HEART RATE: 69 BPM | TEMPERATURE: 97.6 F | WEIGHT: 217.5 LBS | BODY MASS INDEX: 38.54 KG/M2 | OXYGEN SATURATION: 97 %

## 2024-04-04 DIAGNOSIS — Z98.62 PERIPHERAL VASCULAR ANGIOPLASTY STATUS: Chronic | ICD-10-CM

## 2024-04-04 DIAGNOSIS — G89.29 DORSALGIA, UNSPECIFIED: ICD-10-CM

## 2024-04-04 DIAGNOSIS — I25.10 ATHEROSCLEROTIC HEART DISEASE OF NATIVE CORONARY ARTERY W/OUT ANGINA PECTORIS: ICD-10-CM

## 2024-04-04 DIAGNOSIS — E78.5 HYPERLIPIDEMIA, UNSPECIFIED: ICD-10-CM

## 2024-04-04 DIAGNOSIS — I50.9 HEART FAILURE, UNSPECIFIED: ICD-10-CM

## 2024-04-04 DIAGNOSIS — I72.9 ANEURYSM OF UNSPECIFIED SITE: Chronic | ICD-10-CM

## 2024-04-04 DIAGNOSIS — I42.9 CARDIOMYOPATHY, UNSPECIFIED: ICD-10-CM

## 2024-04-04 DIAGNOSIS — I10 ESSENTIAL (PRIMARY) HYPERTENSION: ICD-10-CM

## 2024-04-04 DIAGNOSIS — E04.1 NONTOXIC SINGLE THYROID NODULE: ICD-10-CM

## 2024-04-04 DIAGNOSIS — N18.9 CHRONIC KIDNEY DISEASE, UNSPECIFIED: ICD-10-CM

## 2024-04-04 DIAGNOSIS — Z00.00 ENCOUNTER FOR GENERAL ADULT MEDICAL EXAMINATION WITHOUT ABNORMAL FINDINGS: ICD-10-CM

## 2024-04-04 DIAGNOSIS — Z98.890 OTHER SPECIFIED POSTPROCEDURAL STATES: Chronic | ICD-10-CM

## 2024-04-04 DIAGNOSIS — M54.9 DORSALGIA, UNSPECIFIED: ICD-10-CM

## 2024-04-04 DIAGNOSIS — R73.03 PREDIABETES.: ICD-10-CM

## 2024-04-04 PROCEDURE — 99214 OFFICE O/P EST MOD 30 MIN: CPT

## 2024-04-04 PROCEDURE — 99214 OFFICE O/P EST MOD 30 MIN: CPT | Mod: GC

## 2024-04-04 NOTE — ASSESSMENT
[FreeTextEntry1] : 59 hx of CAD s/p PCI to RCA, cardiac cath complicated Ruptured left CFA pseudoaneurysm s/p patch angioplasty(2017), Disrupted patch angioplasty s/p iliofemoral PTFE bypass, Ruptured distal anastomosis s/p iliofemoral vein bypass. HTN, chronic back pain, active smoker, who presents for routine follow up.  #CAD s/p PCR to RCA - stable #Residual LAD stenosis (small vessel / asymptomatic). #Mixed cardiomyopathy (ischemic / valvular / stress post CVA). # HTN/DLD - no clinical HF - recently has seen Dr. Weber - c/w ASA, lipitor, toprol, norvasc - lipid profile 2024: T chol: 126 (112) LDL 57 (42), HDL: 35 T (179)  #Chronic low back pain- likely due to lumbar spinal stenosis - Lumbosacral xray : grade 1 anterolisthesis of L4-L5 and trace retrolisthesis of L2 on L3, degenerative changes of lumbar spine, sacroiliac joints, hips. - Right sided sciatica, no bowel/bladder incontinence, weakness, or changes in sensation. - MRI lumbar spine - failed PT and robaxin trial - pain management -> will proceed with diagnostic B/L SI joint injections w/ monitored anesthesia care (MAC) - neuro appointment on 2024  #CKD - creat 1.4 - at baseline - avoid nephrotoxic drugs  #preDM - a1c 6.2 2024 - counsleed on ADA diet  # Multiple L. thyroid nodules - US of thyroid 2020 -found to have heterogeneous thyroid glands with innumerable colloid cyst. recommended f/u US in one year.  -TSH 0.86 2024 - pt. did not complete thyroid sono ordered previously, will reorder thyroid US, pt. agrees to complete test  #) HCM - vapes - Pt refused referral to OB/GYN for well women examination - patient denied colonoscopy -> FOBT ordered - patient denied mammography - denied flu shot - RTC in 6 months and prn

## 2024-04-04 NOTE — HISTORY OF PRESENT ILLNESS
[FreeTextEntry1] : follow up [de-identified] : 59 hx of CAD s/p PCI to RCA, cardiac cath complicated Ruptured left CFA pseudoaneurysm s/p patch angioplasty(2017), Disrupted patch angioplasty s/p iliofemoral PTFE bypass, Ruptured distal anastomosis s/p iliofemoral vein bypass. HTN, chronic back pain, active smoker, who presents for the above chief complaint. patient has no major complaints other than her chronic back pain.

## 2024-04-04 NOTE — PHYSICAL EXAM
[Normal] : soft, non-tender, non-distended, no masses palpated, no HSM and normal bowel sounds [No CVA Tenderness] : no CVA  tenderness [No Joint Swelling] : no joint swelling [No Focal Deficits] : no focal deficits [Normal Affect] : the affect was normal

## 2024-04-11 DIAGNOSIS — N18.9 CHRONIC KIDNEY DISEASE, UNSPECIFIED: ICD-10-CM

## 2024-04-11 DIAGNOSIS — M54.9 DORSALGIA, UNSPECIFIED: ICD-10-CM

## 2024-04-11 DIAGNOSIS — I10 ESSENTIAL (PRIMARY) HYPERTENSION: ICD-10-CM

## 2024-04-11 DIAGNOSIS — I42.9 CARDIOMYOPATHY, UNSPECIFIED: ICD-10-CM

## 2024-04-11 DIAGNOSIS — E78.5 HYPERLIPIDEMIA, UNSPECIFIED: ICD-10-CM

## 2024-04-11 DIAGNOSIS — I25.10 ATHEROSCLEROTIC HEART DISEASE OF NATIVE CORONARY ARTERY WITHOUT ANGINA PECTORIS: ICD-10-CM

## 2024-04-11 DIAGNOSIS — R73.03 PREDIABETES: ICD-10-CM

## 2024-04-11 DIAGNOSIS — E04.1 NONTOXIC SINGLE THYROID NODULE: ICD-10-CM

## 2024-04-11 DIAGNOSIS — I50.9 HEART FAILURE, UNSPECIFIED: ICD-10-CM

## 2024-05-16 ENCOUNTER — APPOINTMENT (OUTPATIENT)
Dept: PAIN MANAGEMENT | Facility: CLINIC | Age: 59
End: 2024-05-16

## 2024-05-21 ENCOUNTER — APPOINTMENT (OUTPATIENT)
Dept: NEUROLOGY | Facility: CLINIC | Age: 59
End: 2024-05-21

## 2024-06-07 ENCOUNTER — APPOINTMENT (OUTPATIENT)
Dept: PAIN MANAGEMENT | Facility: CLINIC | Age: 59
End: 2024-06-07
Payer: MEDICAID

## 2024-06-07 DIAGNOSIS — M53.3 SACROCOCCYGEAL DISORDERS, NOT ELSEWHERE CLASSIFIED: ICD-10-CM

## 2024-06-07 PROCEDURE — 94761 N-INVAS EAR/PLS OXIMETRY MLT: CPT | Mod: 59

## 2024-06-07 PROCEDURE — 93040 RHYTHM ECG WITH REPORT: CPT | Mod: 79

## 2024-06-07 PROCEDURE — 93770 DETERMINATION VENOUS PRESS: CPT

## 2024-06-07 PROCEDURE — 27096 INJECT SACROILIAC JOINT: CPT | Mod: 50

## 2024-06-07 PROCEDURE — 01992 ANES DX/THER NRV BLK&INJ PRN: CPT | Mod: QZ,P3

## 2024-06-07 NOTE — PROCEDURE
[FreeTextEntry3] : SACROILIAC JOINT INJECTION UNDER FLUOROSCOPY     Date:  2024  Patient: Lia Resendez  :  1965  Preoperative Diagnosis: Right and Left SIJ Arthropathy  Procedure: Right and Left SIJ Injection under Fluoroscopy  Physician: Anna Hatsings MD, FAAPMR   Anesthesiologist/CRNA: Ms. Ledesma  Anesthesia: See nurses note/MAC/Cold spray. Versed 4 mg, Fentanyl 100 mcg IVP  Medical Necessity:  Failure of conservative management.   Indications:  The patient was admitted for a median branch injection for diagnostic purposes.  The patient was explained the technique, complications and rationale for the procedure and elected to proceed.  Indication for Fluoroscopy:  This procedure requires the precise placement of the spinal needle.  It is the only way to accurately and safely perform the injection.   CONSENT: The possible complications including infection, bleeding, nerve damage, Hospital admission, death or failure of the procedure; though unusual, are theoretically possible. The patient was educated about the of the procedure and alternative therapies. All questions were answered and the patient freely gave consent to proceed.   Monitoring:  Patient had continuous blood pressure, EKG, and pulse oximetry throughout the case. See nurse's notes     PROCEDURE NOTE:  After obtaining written consent, the patient was placed on the fluoroscopic table in the prone position  A time out was performed.  Fluoroscopic guidance was used to isolate and identify the Right and Left sacroiliac joints.  A medial to lateral oblique projection allowed separation of the anterior (lateral) and posterior (medial) joint space.  The sacrum and posterior iliac crest was prepped with Betadine and draped in usual sterile fashion. Cold spray was used to localize the area. A 22-gauge 3.5 inch spinal needle was directed into the inferior aspect of the sacroiliac joint using a posterior approach in bull's eye fashion. The interosseous ligament was engaged which provoked responses consisting of her typical painful symptoms. A 2 cc total volume of lidocaine 2% ( 1  cc) and depomedrol 40mg (1/2 cc) was injected. Volumes were kept small-commensurate with the joint's capacity as determined by end-injection back pressure on the syringe. The needle was removed.     Sacro-iliac Joint Radiography:  Omnipaque 240mg/cc - 1/2 cc was injected in the inferior pole of the SIJ and the entire sacroiliac joint was outlined under fluoroscopy.    Complications: None. The patient tolerated the procedure well.     Disposition: I have examined the patient and there are no new physical findings since the original presentation.  Sensory and motor function were intact. The patient met discharge criteria see nurses notes. The discharge instruction sheet was reviewed and given to the patient. The patient was discharged home with a . If patient gets sustained relief will have patient do modified planks 3 times a day on carpet or yoga mat starting at 5 seconds building up to 1 minute without grimacing/Valsalva and walking.  If patient gets sustained relief will have patient do modified planks 3 times a day on carpet or yoga mat starting at 5 seconds building up to 1 minute without grimacing/Valsalva and walking.     Comment: If 70% relief greater than 2 hours or 50% greater than 24 hours would proceed to RFA. If 50% less than 24 hours would repeat to confirm for RFA. Follow in office. Call if any problems.  This document was electronically signed by:    Anna Hastings MD, FAAPMR Diplomate, American Board of Physical Medicine and Rehabilitation Diplomate, American Board of Pain Medicine

## 2024-07-02 ENCOUNTER — APPOINTMENT (OUTPATIENT)
Dept: PAIN MANAGEMENT | Facility: CLINIC | Age: 59
End: 2024-07-02

## 2024-07-09 ENCOUNTER — OUTPATIENT (OUTPATIENT)
Dept: OUTPATIENT SERVICES | Facility: HOSPITAL | Age: 59
LOS: 1 days | End: 2024-07-09
Payer: MEDICAID

## 2024-07-09 ENCOUNTER — APPOINTMENT (OUTPATIENT)
Dept: NEUROLOGY | Facility: CLINIC | Age: 59
End: 2024-07-09
Payer: MEDICAID

## 2024-07-09 VITALS — HEART RATE: 76 BPM | DIASTOLIC BLOOD PRESSURE: 85 MMHG | OXYGEN SATURATION: 97 % | SYSTOLIC BLOOD PRESSURE: 121 MMHG

## 2024-07-09 DIAGNOSIS — G89.29 DORSALGIA, UNSPECIFIED: ICD-10-CM

## 2024-07-09 DIAGNOSIS — Z98.890 OTHER SPECIFIED POSTPROCEDURAL STATES: Chronic | ICD-10-CM

## 2024-07-09 DIAGNOSIS — I72.9 ANEURYSM OF UNSPECIFIED SITE: Chronic | ICD-10-CM

## 2024-07-09 DIAGNOSIS — Z98.62 PERIPHERAL VASCULAR ANGIOPLASTY STATUS: Chronic | ICD-10-CM

## 2024-07-09 DIAGNOSIS — M54.9 DORSALGIA, UNSPECIFIED: ICD-10-CM

## 2024-07-09 DIAGNOSIS — Z00.00 ENCOUNTER FOR GENERAL ADULT MEDICAL EXAMINATION WITHOUT ABNORMAL FINDINGS: ICD-10-CM

## 2024-07-09 PROCEDURE — 99204 OFFICE O/P NEW MOD 45 MIN: CPT

## 2024-07-09 PROCEDURE — G2211 COMPLEX E/M VISIT ADD ON: CPT | Mod: NC,1L

## 2024-07-09 PROCEDURE — 99214 OFFICE O/P EST MOD 30 MIN: CPT

## 2024-07-09 RX ORDER — GABAPENTIN 100 MG/1
100 CAPSULE ORAL 3 TIMES DAILY
Qty: 90 | Refills: 0 | Status: ACTIVE | COMMUNITY
Start: 2024-07-09 | End: 1900-01-01

## 2024-07-10 DIAGNOSIS — M54.9 DORSALGIA, UNSPECIFIED: ICD-10-CM

## 2024-07-11 ENCOUNTER — NON-APPOINTMENT (OUTPATIENT)
Age: 59
End: 2024-07-11

## 2024-07-19 ENCOUNTER — APPOINTMENT (OUTPATIENT)
Dept: CARDIOLOGY | Facility: CLINIC | Age: 59
End: 2024-07-19
Payer: MEDICAID

## 2024-07-19 VITALS
DIASTOLIC BLOOD PRESSURE: 86 MMHG | BODY MASS INDEX: 36.68 KG/M2 | HEART RATE: 69 BPM | HEIGHT: 63 IN | SYSTOLIC BLOOD PRESSURE: 110 MMHG | WEIGHT: 207 LBS

## 2024-07-19 DIAGNOSIS — N18.9 CHRONIC KIDNEY DISEASE, UNSPECIFIED: ICD-10-CM

## 2024-07-19 DIAGNOSIS — I25.10 ATHEROSCLEROTIC HEART DISEASE OF NATIVE CORONARY ARTERY W/OUT ANGINA PECTORIS: ICD-10-CM

## 2024-07-19 DIAGNOSIS — I34.0 NONRHEUMATIC MITRAL (VALVE) INSUFFICIENCY: ICD-10-CM

## 2024-07-19 DIAGNOSIS — I10 ESSENTIAL (PRIMARY) HYPERTENSION: ICD-10-CM

## 2024-07-19 DIAGNOSIS — I42.9 CARDIOMYOPATHY, UNSPECIFIED: ICD-10-CM

## 2024-07-19 PROCEDURE — 93000 ELECTROCARDIOGRAM COMPLETE: CPT

## 2024-07-19 PROCEDURE — 99214 OFFICE O/P EST MOD 30 MIN: CPT

## 2024-08-06 ENCOUNTER — RX RENEWAL (OUTPATIENT)
Age: 59
End: 2024-08-06

## 2024-10-10 ENCOUNTER — APPOINTMENT (OUTPATIENT)
Dept: INTERNAL MEDICINE | Facility: CLINIC | Age: 59
End: 2024-10-10
Payer: MEDICAID

## 2024-10-10 ENCOUNTER — OUTPATIENT (OUTPATIENT)
Dept: OUTPATIENT SERVICES | Facility: HOSPITAL | Age: 59
LOS: 1 days | End: 2024-10-10
Payer: MEDICAID

## 2024-10-10 VITALS
SYSTOLIC BLOOD PRESSURE: 121 MMHG | WEIGHT: 207.06 LBS | OXYGEN SATURATION: 97 % | HEIGHT: 63 IN | BODY MASS INDEX: 36.69 KG/M2 | HEART RATE: 68 BPM | TEMPERATURE: 97 F | DIASTOLIC BLOOD PRESSURE: 81 MMHG

## 2024-10-10 DIAGNOSIS — Z00.00 ENCOUNTER FOR GENERAL ADULT MEDICAL EXAMINATION WITHOUT ABNORMAL FINDINGS: ICD-10-CM

## 2024-10-10 DIAGNOSIS — R73.03 PREDIABETES.: ICD-10-CM

## 2024-10-10 DIAGNOSIS — I42.9 CARDIOMYOPATHY, UNSPECIFIED: ICD-10-CM

## 2024-10-10 DIAGNOSIS — I63.9 CEREBRAL INFARCTION, UNSPECIFIED: ICD-10-CM

## 2024-10-10 DIAGNOSIS — I25.10 ATHEROSCLEROTIC HEART DISEASE OF NATIVE CORONARY ARTERY W/OUT ANGINA PECTORIS: ICD-10-CM

## 2024-10-10 DIAGNOSIS — I50.9 HEART FAILURE, UNSPECIFIED: ICD-10-CM

## 2024-10-10 DIAGNOSIS — N18.9 CHRONIC KIDNEY DISEASE, UNSPECIFIED: ICD-10-CM

## 2024-10-10 DIAGNOSIS — F17.200 NICOTINE DEPENDENCE, UNSPECIFIED, UNCOMPLICATED: ICD-10-CM

## 2024-10-10 DIAGNOSIS — E04.1 NONTOXIC SINGLE THYROID NODULE: ICD-10-CM

## 2024-10-10 DIAGNOSIS — G89.29 DORSALGIA, UNSPECIFIED: ICD-10-CM

## 2024-10-10 DIAGNOSIS — I10 ESSENTIAL (PRIMARY) HYPERTENSION: ICD-10-CM

## 2024-10-10 DIAGNOSIS — E78.5 HYPERLIPIDEMIA, UNSPECIFIED: ICD-10-CM

## 2024-10-10 DIAGNOSIS — I73.9 PERIPHERAL VASCULAR DISEASE, UNSPECIFIED: ICD-10-CM

## 2024-10-10 DIAGNOSIS — Z98.62 PERIPHERAL VASCULAR ANGIOPLASTY STATUS: Chronic | ICD-10-CM

## 2024-10-10 DIAGNOSIS — M54.9 DORSALGIA, UNSPECIFIED: ICD-10-CM

## 2024-10-10 DIAGNOSIS — Z98.890 OTHER SPECIFIED POSTPROCEDURAL STATES: Chronic | ICD-10-CM

## 2024-10-10 DIAGNOSIS — I72.9 ANEURYSM OF UNSPECIFIED SITE: Chronic | ICD-10-CM

## 2024-10-10 PROCEDURE — G2211 COMPLEX E/M VISIT ADD ON: CPT | Mod: NC

## 2024-10-10 PROCEDURE — 99214 OFFICE O/P EST MOD 30 MIN: CPT | Mod: GC

## 2024-10-10 PROCEDURE — 99214 OFFICE O/P EST MOD 30 MIN: CPT

## 2024-10-25 DIAGNOSIS — I50.9 HEART FAILURE, UNSPECIFIED: ICD-10-CM

## 2024-10-25 DIAGNOSIS — I10 ESSENTIAL (PRIMARY) HYPERTENSION: ICD-10-CM

## 2024-10-25 DIAGNOSIS — I73.9 PERIPHERAL VASCULAR DISEASE, UNSPECIFIED: ICD-10-CM

## 2024-10-25 DIAGNOSIS — E78.5 HYPERLIPIDEMIA, UNSPECIFIED: ICD-10-CM

## 2024-10-25 DIAGNOSIS — I25.10 ATHEROSCLEROTIC HEART DISEASE OF NATIVE CORONARY ARTERY WITHOUT ANGINA PECTORIS: ICD-10-CM

## 2024-10-25 DIAGNOSIS — M54.9 DORSALGIA, UNSPECIFIED: ICD-10-CM

## 2024-10-25 DIAGNOSIS — F17.200 NICOTINE DEPENDENCE, UNSPECIFIED, UNCOMPLICATED: ICD-10-CM

## 2024-10-25 DIAGNOSIS — R73.03 PREDIABETES: ICD-10-CM

## 2024-10-25 DIAGNOSIS — E04.1 NONTOXIC SINGLE THYROID NODULE: ICD-10-CM

## 2024-10-25 DIAGNOSIS — I42.9 CARDIOMYOPATHY, UNSPECIFIED: ICD-10-CM

## 2024-12-18 ENCOUNTER — APPOINTMENT (OUTPATIENT)
Dept: PAIN MANAGEMENT | Facility: CLINIC | Age: 59
End: 2024-12-18
Payer: MEDICAID

## 2024-12-18 DIAGNOSIS — M53.3 SACROCOCCYGEAL DISORDERS, NOT ELSEWHERE CLASSIFIED: ICD-10-CM

## 2024-12-18 PROCEDURE — 99213 OFFICE O/P EST LOW 20 MIN: CPT

## 2024-12-19 ENCOUNTER — APPOINTMENT (OUTPATIENT)
Dept: CARDIOLOGY | Facility: CLINIC | Age: 59
End: 2024-12-19

## 2025-01-03 ENCOUNTER — APPOINTMENT (OUTPATIENT)
Facility: CLINIC | Age: 60
End: 2025-01-03

## 2025-01-03 ENCOUNTER — APPOINTMENT (OUTPATIENT)
Dept: PAIN MANAGEMENT | Facility: CLINIC | Age: 60
End: 2025-01-03

## 2025-01-24 ENCOUNTER — APPOINTMENT (OUTPATIENT)
Dept: CARDIOLOGY | Facility: CLINIC | Age: 60
End: 2025-01-24

## 2025-01-24 ENCOUNTER — NON-APPOINTMENT (OUTPATIENT)
Age: 60
End: 2025-01-24

## 2025-01-24 VITALS
SYSTOLIC BLOOD PRESSURE: 120 MMHG | BODY MASS INDEX: 36.32 KG/M2 | WEIGHT: 205 LBS | HEART RATE: 69 BPM | HEIGHT: 63 IN | DIASTOLIC BLOOD PRESSURE: 82 MMHG

## 2025-01-24 DIAGNOSIS — I50.9 HEART FAILURE, UNSPECIFIED: ICD-10-CM

## 2025-01-24 LAB
ESTIMATED AVERAGE GLUCOSE: 123 MG/DL
HBA1C MFR BLD HPLC: 5.9 %
TSH SERPL-ACNC: 0.97 UIU/ML

## 2025-01-24 PROCEDURE — 99214 OFFICE O/P EST MOD 30 MIN: CPT

## 2025-01-24 PROCEDURE — 93000 ELECTROCARDIOGRAM COMPLETE: CPT

## 2025-01-25 LAB
ALBUMIN SERPL ELPH-MCNC: 4.3 G/DL
ALP BLD-CCNC: 101 U/L
ALT SERPL-CCNC: 22 U/L
ANION GAP SERPL CALC-SCNC: 13 MMOL/L
AST SERPL-CCNC: 17 U/L
BASOPHILS # BLD AUTO: 0.09 K/UL
BASOPHILS NFR BLD AUTO: 1 %
BILIRUB SERPL-MCNC: 0.2 MG/DL
BUN SERPL-MCNC: 19 MG/DL
CALCIUM SERPL-MCNC: 10 MG/DL
CHLORIDE SERPL-SCNC: 102 MMOL/L
CHOLEST SERPL-MCNC: 120 MG/DL
CO2 SERPL-SCNC: 25 MMOL/L
CREAT SERPL-MCNC: 1.5 MG/DL
EGFR: 40 ML/MIN/1.73M2
EOSINOPHIL # BLD AUTO: 0.11 K/UL
EOSINOPHIL NFR BLD AUTO: 1.2 %
GLUCOSE SERPL-MCNC: 107 MG/DL
HCT VFR BLD CALC: 47.3 %
HDLC SERPL-MCNC: 45 MG/DL
HGB BLD-MCNC: 15.5 G/DL
IMM GRANULOCYTES NFR BLD AUTO: 0.3 %
LDLC SERPL CALC-MCNC: 52 MG/DL
LYMPHOCYTES # BLD AUTO: 1.95 K/UL
LYMPHOCYTES NFR BLD AUTO: 21.5 %
MAN DIFF?: NORMAL
MCHC RBC-ENTMCNC: 27 PG
MCHC RBC-ENTMCNC: 32.8 G/DL
MCV RBC AUTO: 82.4 FL
MONOCYTES # BLD AUTO: 0.51 K/UL
MONOCYTES NFR BLD AUTO: 5.6 %
NEUTROPHILS # BLD AUTO: 6.4 K/UL
NEUTROPHILS NFR BLD AUTO: 70.4 %
NONHDLC SERPL-MCNC: 75 MG/DL
PLATELET # BLD AUTO: 253 K/UL
POTASSIUM SERPL-SCNC: 4.6 MMOL/L
PROT SERPL-MCNC: 7.5 G/DL
RBC # BLD: 5.74 M/UL
RBC # FLD: 15 %
SODIUM SERPL-SCNC: 140 MMOL/L
TRIGL SERPL-MCNC: 132 MG/DL
WBC # FLD AUTO: 9.09 K/UL

## 2025-01-30 ENCOUNTER — APPOINTMENT (OUTPATIENT)
Dept: PAIN MANAGEMENT | Facility: CLINIC | Age: 60
End: 2025-01-30

## 2025-03-18 ENCOUNTER — APPOINTMENT (OUTPATIENT)
Dept: NEUROLOGY | Facility: CLINIC | Age: 60
End: 2025-03-18

## 2025-04-10 ENCOUNTER — APPOINTMENT (OUTPATIENT)
Dept: INTERNAL MEDICINE | Facility: CLINIC | Age: 60
End: 2025-04-10

## 2025-07-14 ENCOUNTER — APPOINTMENT (OUTPATIENT)
Dept: CARDIOLOGY | Facility: CLINIC | Age: 60
End: 2025-07-14

## 2025-07-14 PROCEDURE — 93306 TTE W/DOPPLER COMPLETE: CPT
